# Patient Record
Sex: MALE | Race: WHITE | NOT HISPANIC OR LATINO | Employment: UNEMPLOYED | URBAN - METROPOLITAN AREA
[De-identification: names, ages, dates, MRNs, and addresses within clinical notes are randomized per-mention and may not be internally consistent; named-entity substitution may affect disease eponyms.]

---

## 2023-07-26 ENCOUNTER — HOSPITAL ENCOUNTER (OUTPATIENT)
Dept: DATA CONVERSION | Facility: HOSPITAL | Age: 83
Discharge: HOME HEALTH | End: 2023-07-29
Attending: INTERNAL MEDICINE

## 2023-07-26 DIAGNOSIS — M25.512 PAIN IN LEFT SHOULDER: ICD-10-CM

## 2023-07-26 DIAGNOSIS — Z86.16 PERSONAL HISTORY OF COVID-19: ICD-10-CM

## 2023-07-26 DIAGNOSIS — G47.00 INSOMNIA, UNSPECIFIED: ICD-10-CM

## 2023-07-26 DIAGNOSIS — Z63.4 DISAPPEARANCE AND DEATH OF FAMILY MEMBER: ICD-10-CM

## 2023-07-26 DIAGNOSIS — M19.90 UNSPECIFIED OSTEOARTHRITIS, UNSPECIFIED SITE: ICD-10-CM

## 2023-07-26 DIAGNOSIS — F32.A DEPRESSION, UNSPECIFIED: ICD-10-CM

## 2023-07-26 DIAGNOSIS — R42 DIZZINESS AND GIDDINESS: ICD-10-CM

## 2023-07-26 DIAGNOSIS — Z87.891 PERSONAL HISTORY OF NICOTINE DEPENDENCE: ICD-10-CM

## 2023-07-26 DIAGNOSIS — N40.0 BENIGN PROSTATIC HYPERPLASIA WITHOUT LOWER URINARY TRACT SYMPTOMS: ICD-10-CM

## 2023-07-26 DIAGNOSIS — E78.5 HYPERLIPIDEMIA, UNSPECIFIED: ICD-10-CM

## 2023-07-26 DIAGNOSIS — Z96.653 PRESENCE OF ARTIFICIAL KNEE JOINT, BILATERAL: ICD-10-CM

## 2023-07-26 DIAGNOSIS — M25.511 PAIN IN RIGHT SHOULDER: ICD-10-CM

## 2023-07-26 DIAGNOSIS — F10.10 ALCOHOL ABUSE, UNCOMPLICATED: ICD-10-CM

## 2023-07-26 DIAGNOSIS — Z79.899 OTHER LONG TERM (CURRENT) DRUG THERAPY: ICD-10-CM

## 2023-07-26 DIAGNOSIS — R77.8 OTHER SPECIFIED ABNORMALITIES OF PLASMA PROTEINS: ICD-10-CM

## 2023-07-26 DIAGNOSIS — R60.0 LOCALIZED EDEMA: ICD-10-CM

## 2023-07-26 DIAGNOSIS — M62.81 MUSCLE WEAKNESS (GENERALIZED): ICD-10-CM

## 2023-07-26 DIAGNOSIS — M54.50 LOW BACK PAIN, UNSPECIFIED: ICD-10-CM

## 2023-07-26 DIAGNOSIS — I10 ESSENTIAL (PRIMARY) HYPERTENSION: ICD-10-CM

## 2023-07-26 DIAGNOSIS — G62.9 POLYNEUROPATHY, UNSPECIFIED: ICD-10-CM

## 2023-07-26 DIAGNOSIS — R41.82 ALTERED MENTAL STATUS, UNSPECIFIED: ICD-10-CM

## 2023-07-26 DIAGNOSIS — I71.40 ABDOMINAL AORTIC ANEURYSM, WITHOUT RUPTURE, UNSPECIFIED (CMS-HCC): ICD-10-CM

## 2023-07-26 DIAGNOSIS — I44.0 ATRIOVENTRICULAR BLOCK, FIRST DEGREE: ICD-10-CM

## 2023-07-26 LAB
ALBUMIN SERPL-MCNC: 3.7 GM/DL (ref 3.5–5)
ALBUMIN/GLOB SERPL: 1.3 RATIO (ref 1.5–3)
ALP BLD-CCNC: 81 U/L (ref 35–125)
ALT SERPL-CCNC: 8 U/L (ref 5–40)
AMPHETAMINES UR QL SCN>1000 NG/ML: NEGATIVE
ANION GAP SERPL CALCULATED.3IONS-SCNC: 11 MMOL/L (ref 0–19)
AST SERPL-CCNC: 13 U/L (ref 5–40)
BACTERIA UR QL AUTO: NEGATIVE
BARBITURATES UR QL SCN>300 NG/ML: NEGATIVE
BASOPHILS # BLD AUTO: 0.03 K/UL (ref 0–0.22)
BASOPHILS NFR BLD AUTO: 0.5 % (ref 0–1)
BENZODIAZ UR QL SCN>300 NG/ML: NEGATIVE
BILIRUB DIRECT SERPL-MCNC: 0.2 MG/DL (ref 0–0.2)
BILIRUB INDIRECT SERPL-MCNC: 0.3 MG/DL (ref 0–0.8)
BILIRUB SERPL-MCNC: 0.5 MG/DL (ref 0.1–1.2)
BILIRUB UR QL STRIP.AUTO: NEGATIVE
BUN SERPL-MCNC: 11 MG/DL (ref 8–25)
BUN/CREAT SERPL: 8.5 RATIO (ref 8–21)
BZE UR QL SCN>300 NG/ML: NEGATIVE
CALCIUM SERPL-MCNC: 9 MG/DL (ref 8.5–10.4)
CANNABINOIDS UR QL SCN>50 NG/ML: NORMAL
CHLORIDE SERPL-SCNC: 103 MMOL/L (ref 97–107)
CLARITY UR: CLEAR
CO2 SERPL-SCNC: 24 MMOL/L (ref 24–31)
COLOR UR: NORMAL
CREAT SERPL-MCNC: 1.3 MG/DL (ref 0.4–1.6)
DEPRECATED RDW RBC AUTO: 47.7 FL (ref 37–54)
DIFFERENTIAL METHOD BLD: ABNORMAL
DRUG SCREEN COMMENT UR-IMP: NORMAL
EOSINOPHIL # BLD AUTO: 0.04 K/UL (ref 0–0.45)
EOSINOPHIL NFR BLD: 0.7 % (ref 0–3)
ERYTHROCYTE [DISTWIDTH] IN BLOOD BY AUTOMATED COUNT: 12.6 % (ref 11.7–15)
ETHANOL SERPL-MCNC: <0.01 GM/DL (ref 0–0.01)
FENTANYL+NORFENTANYL UR QL SCN: NEGATIVE
GFR SERPL CREATININE-BSD FRML MDRD: 55 ML/MIN/1.73 M2
GLOBULIN SER-MCNC: 2.9 G/DL (ref 1.9–3.7)
GLUCOSE SERPL-MCNC: 101 MG/DL (ref 65–99)
GLUCOSE UR STRIP.AUTO-MCNC: NEGATIVE MG/DL
HCT VFR BLD AUTO: 33.8 % (ref 41–50)
HGB BLD-MCNC: 11.9 GM/DL (ref 13.5–16.5)
HGB UR QL STRIP.AUTO: NORMAL /HPF (ref 0–3)
HGB UR QL: NEGATIVE
HS TROPONIN T DELTA: 1 (ref 0–4)
HS TROPONIN T DELTA: 5 (ref 0–4)
HS TROPONIN T DELTA: ABNORMAL (ref 0–4)
HYALINE CASTS UR QL AUTO: 3 /LPF
IMM GRANULOCYTES # BLD AUTO: 0.03 K/UL (ref 0–0.1)
KETONES UR QL STRIP.AUTO: NEGATIVE
LEUKOCYTE ESTERASE UR QL STRIP.AUTO: NEGATIVE
LIPASE SERPL-CCNC: 22 U/L (ref 16–63)
LYMPHOCYTES # BLD AUTO: 0.78 K/UL (ref 1.2–3.2)
LYMPHOCYTES NFR BLD MANUAL: 13.6 % (ref 20–40)
MCH RBC QN AUTO: 36.2 PG (ref 26–34)
MCHC RBC AUTO-ENTMCNC: 35.2 % (ref 31–37)
MCV RBC AUTO: 102.7 FL (ref 80–100)
METHADONE UR QL SCN>300 NG/ML: NEGATIVE
MICROSCOPIC (UA): NORMAL
MONOCYTES # BLD AUTO: 0.43 K/UL (ref 0–0.8)
MONOCYTES NFR BLD MANUAL: 7.5 % (ref 0–8)
NEUTROPHILS # BLD AUTO: 4.41 K/UL
NEUTROPHILS # BLD AUTO: 4.41 K/UL (ref 1.8–7.7)
NEUTROPHILS.IMMATURE NFR BLD: 0.5 % (ref 0–1)
NEUTS SEG NFR BLD: 77.2 % (ref 50–70)
NITRITE UR QL STRIP.AUTO: NEGATIVE
NRBC BLD-RTO: 0 /100 WBC
NT-PROBNP SERPL-MCNC: 202 PG/ML (ref 0–852)
OPIATES UR QL SCN>300 NG/ML: NEGATIVE
OXYCODONE UR QL: NEGATIVE
PCP UR QL SCN>25 NG/ML: NEGATIVE
PH UR STRIP.AUTO: 7.5 [PH] (ref 4.6–8)
PLATELET # BLD AUTO: 181 K/UL (ref 150–450)
PMV BLD AUTO: 10.6 CU (ref 7–12.6)
POTASSIUM SERPL-SCNC: 3.8 MMOL/L (ref 3.4–5.1)
PROT SERPL-MCNC: 6.6 G/DL (ref 5.9–7.9)
PROT UR STRIP.AUTO-MCNC: NEGATIVE MG/DL
RBC # BLD AUTO: 3.29 M/UL (ref 4.5–5.5)
SODIUM SERPL-SCNC: 138 MMOL/L (ref 133–145)
SP GR UR STRIP.AUTO: 1.01 (ref 1–1.03)
SQUAMOUS UR QL AUTO: NORMAL /HPF
TROPONIN T SERPL-MCNC: 21 NG/L
TROPONIN T SERPL-MCNC: 26 NG/L
TROPONIN T SERPL-MCNC: 27 NG/L
URINE CULTURE: NORMAL
UROBILINOGEN UR QL STRIP.AUTO: NORMAL MG/DL (ref 0–1)
WBC # BLD AUTO: 5.7 K/UL (ref 4.5–11)
WBC #/AREA URNS AUTO: 1 /HPF (ref 0–3)

## 2023-07-26 SDOH — SOCIAL STABILITY - SOCIAL INSECURITY: DISSAPEARANCE AND DEATH OF FAMILY MEMBER: Z63.4

## 2023-07-27 LAB
ALBUMIN SERPL-MCNC: 3.4 GM/DL (ref 3.5–5)
ALBUMIN/GLOB SERPL: 1.4 RATIO (ref 1.5–3)
ALP BLD-CCNC: 73 U/L (ref 35–125)
ALT SERPL-CCNC: 7 U/L (ref 5–40)
ANION GAP SERPL CALCULATED.3IONS-SCNC: 11 MMOL/L (ref 0–19)
ANTICOAGULANT: NORMAL
ANTICOAGULANT: NORMAL
APPEARANCE PLAS: CLEAR
APTT PPP: 28.2 SEC (ref 22–32.5)
AST SERPL-CCNC: 11 U/L (ref 5–40)
BASOPHILS # BLD AUTO: 0.02 K/UL (ref 0–0.22)
BASOPHILS NFR BLD AUTO: 0.4 % (ref 0–1)
BILIRUB SERPL-MCNC: 0.4 MG/DL (ref 0.1–1.2)
BUN SERPL-MCNC: 12 MG/DL (ref 8–25)
BUN/CREAT SERPL: 10.9 RATIO (ref 8–21)
CALCIUM SERPL-MCNC: 8.6 MG/DL (ref 8.5–10.4)
CHLORIDE SERPL-SCNC: 106 MMOL/L (ref 97–107)
CHOLEST SERPL-MCNC: 121 MG/DL (ref 133–200)
CHOLEST/HDLC SERPL: 2 RATIO
CO2 SERPL-SCNC: 23 MMOL/L (ref 24–31)
COLOR SPUN FLD: ABNORMAL
CREAT SERPL-MCNC: 1.1 MG/DL (ref 0.4–1.6)
DEPRECATED RDW RBC AUTO: 47.6 FL (ref 37–54)
DIFFERENTIAL METHOD BLD: ABNORMAL
EOSINOPHIL # BLD AUTO: 0.01 K/UL (ref 0–0.45)
EOSINOPHIL NFR BLD: 0.2 % (ref 0–3)
ERYTHROCYTE [DISTWIDTH] IN BLOOD BY AUTOMATED COUNT: 12.4 % (ref 11.7–15)
FASTING STATUS PATIENT QL REPORTED: ABNORMAL
GFR SERPL CREATININE-BSD FRML MDRD: 67 ML/MIN/1.73 M2
GLOBULIN SER-MCNC: 2.5 G/DL (ref 1.9–3.7)
GLUCOSE SERPL-MCNC: 94 MG/DL (ref 65–99)
HCT VFR BLD AUTO: 30.4 % (ref 41–50)
HDLC SERPL-MCNC: 62 MG/DL
HGB BLD-MCNC: 10.4 GM/DL (ref 13.5–16.5)
IMM GRANULOCYTES # BLD AUTO: 0.02 K/UL (ref 0–0.1)
INR PPP: 1.1 (ref 0.86–1.16)
LDLC SERPL CALC-MCNC: 50 MG/DL (ref 65–130)
LYMPHOCYTES # BLD AUTO: 1 K/UL (ref 1.2–3.2)
LYMPHOCYTES NFR BLD MANUAL: 21.1 % (ref 20–40)
MAGNESIUM SERPL-MCNC: 2.2 MG/DL (ref 1.6–3.1)
MCH RBC QN AUTO: 35.5 PG (ref 26–34)
MCHC RBC AUTO-ENTMCNC: 34.2 % (ref 31–37)
MCV RBC AUTO: 103.8 FL (ref 80–100)
MONOCYTES # BLD AUTO: 0.41 K/UL (ref 0–0.8)
MONOCYTES NFR BLD MANUAL: 8.6 % (ref 0–8)
NEUTROPHILS # BLD AUTO: 3.28 K/UL
NEUTROPHILS # BLD AUTO: 3.28 K/UL (ref 1.8–7.7)
NEUTROPHILS.IMMATURE NFR BLD: 0.4 % (ref 0–1)
NEUTS SEG NFR BLD: 69.3 % (ref 50–70)
NRBC BLD-RTO: 0 /100 WBC
PLATELET # BLD AUTO: 175 K/UL (ref 150–450)
PMV BLD AUTO: 11.3 CU (ref 7–12.6)
POTASSIUM SERPL-SCNC: 3.7 MMOL/L (ref 3.4–5.1)
PROT SERPL-MCNC: 5.9 G/DL (ref 5.9–7.9)
PROTHROMBIN TIME: 11.2 SEC (ref 9.3–12.7)
RBC # BLD AUTO: 2.93 M/UL (ref 4.5–5.5)
SODIUM SERPL-SCNC: 140 MMOL/L (ref 133–145)
TRIGL SERPL-MCNC: 45 MG/DL (ref 40–150)
WBC # BLD AUTO: 4.7 K/UL (ref 4.5–11)

## 2023-07-28 LAB — GLUCOSE BLD STRIP.AUTO-MCNC: 101 MG/DL (ref 65–99)

## 2023-10-13 DIAGNOSIS — G62.9 NEUROPATHY: ICD-10-CM

## 2023-10-13 RX ORDER — GABAPENTIN 300 MG/1
1 CAPSULE ORAL 3 TIMES DAILY
COMMUNITY
Start: 2022-07-05 | End: 2023-10-13 | Stop reason: SDUPTHER

## 2023-10-15 RX ORDER — GABAPENTIN 300 MG/1
300 CAPSULE ORAL 3 TIMES DAILY
Qty: 90 CAPSULE | Refills: 5 | Status: SHIPPED | OUTPATIENT
Start: 2023-10-15 | End: 2024-03-25 | Stop reason: SDUPTHER

## 2023-10-17 ENCOUNTER — TELEPHONE (OUTPATIENT)
Dept: PRIMARY CARE | Facility: CLINIC | Age: 83
End: 2023-10-17
Payer: COMMERCIAL

## 2023-10-17 NOTE — TELEPHONE ENCOUNTER
Call placed to patient number as listed, [phone], no answer, left message requesting return call to office to discuss appointment 11/8/23 , provider ALEX Valadez NP not available, will keep appointment same date and time. Provider will be Dara Souza NP.  Awaiting return call

## 2023-10-23 NOTE — TELEPHONE ENCOUNTER
It is noted patient returned call to office, was scheduled an appointment 10/25/23 with Dara Souza NP.

## 2023-11-15 PROBLEM — F32.9 MAJOR DEPRESSIVE DISORDER, SINGLE EPISODE, UNSPECIFIED: Status: ACTIVE | Noted: 2023-11-15

## 2023-11-15 PROBLEM — F41.8 MIXED ANXIETY AND DEPRESSIVE DISORDER: Status: ACTIVE | Noted: 2023-11-15

## 2023-11-15 PROBLEM — M15.9 OSTEOARTHRITIS OF MULTIPLE JOINTS: Status: ACTIVE | Noted: 2023-11-15

## 2023-11-15 PROBLEM — F41.9 ANXIETY: Status: ACTIVE | Noted: 2023-11-15

## 2023-11-15 PROBLEM — G47.00 INSOMNIA: Status: ACTIVE | Noted: 2023-11-15

## 2023-11-15 PROBLEM — I71.40 AAA (ABDOMINAL AORTIC ANEURYSM) WITHOUT RUPTURE (CMS-HCC): Status: ACTIVE | Noted: 2023-11-15

## 2023-11-15 PROBLEM — F17.200 NICOTINE DEPENDENCE: Status: ACTIVE | Noted: 2023-11-15

## 2023-11-15 PROBLEM — F03.90 DEMENTIA (MULTI): Status: ACTIVE | Noted: 2023-11-15

## 2023-11-15 PROBLEM — J31.0 CHRONIC RHINITIS: Status: ACTIVE | Noted: 2023-11-15

## 2023-11-15 PROBLEM — I10 ESSENTIAL HYPERTENSION: Status: ACTIVE | Noted: 2019-08-05

## 2023-11-15 PROBLEM — R41.3 MEMORY LOSS DUE TO MEDICAL CONDITION: Status: ACTIVE | Noted: 2023-11-15

## 2023-11-15 PROBLEM — F43.21 GRIEF: Status: ACTIVE | Noted: 2023-11-15

## 2023-11-15 PROBLEM — K59.00 CONSTIPATION: Status: ACTIVE | Noted: 2023-11-15

## 2023-11-15 PROBLEM — F33.2 MAJOR DEPRESSIVE DISORDER, RECURRENT SEVERE WITHOUT PSYCHOTIC FEATURES (MULTI): Status: ACTIVE | Noted: 2023-11-15

## 2023-11-15 PROBLEM — Z96.612 STATUS POST REVERSE TOTAL REPLACEMENT OF LEFT SHOULDER: Status: ACTIVE | Noted: 2023-11-15

## 2023-11-15 PROBLEM — G40.909 SEIZURE DISORDER (MULTI): Status: ACTIVE | Noted: 2023-11-15

## 2023-11-15 PROBLEM — J32.9 SINUSITIS: Status: ACTIVE | Noted: 2019-11-18

## 2023-11-15 PROBLEM — E55.9 VITAMIN D DEFICIENCY: Status: ACTIVE | Noted: 2020-03-04

## 2023-11-15 PROBLEM — E78.5 HYPERLIPIDEMIA: Status: ACTIVE | Noted: 2019-08-05

## 2023-11-15 PROBLEM — M19.079 PRIMARY LOCALIZED OSTEOARTHROSIS OF ANKLE AND FOOT: Status: ACTIVE | Noted: 2023-11-15

## 2023-11-15 PROBLEM — N40.0 BENIGN PROSTATIC HYPERPLASIA: Status: ACTIVE | Noted: 2023-11-15

## 2023-11-15 PROBLEM — M19.049 LOCALIZED, PRIMARY OSTEOARTHRITIS OF HAND: Status: ACTIVE | Noted: 2023-11-15

## 2023-11-15 PROBLEM — G93.40 ENCEPHALOPATHY: Status: ACTIVE | Noted: 2023-11-15

## 2023-11-15 PROBLEM — H91.90 HEARING LOSS: Status: ACTIVE | Noted: 2023-11-15

## 2023-11-15 RX ORDER — POLYVINYL ALCOHOL 14 MG/ML
2 SOLUTION/ DROPS OPHTHALMIC 3 TIMES DAILY PRN
COMMUNITY

## 2023-11-15 RX ORDER — LISINOPRIL 20 MG/1
20 TABLET ORAL DAILY
COMMUNITY
Start: 2022-06-01 | End: 2024-01-18 | Stop reason: SDUPTHER

## 2023-11-15 RX ORDER — AMLODIPINE BESYLATE 5 MG/1
5 TABLET ORAL DAILY
COMMUNITY
Start: 2023-07-24 | End: 2023-12-08 | Stop reason: SDUPTHER

## 2023-11-15 RX ORDER — FUROSEMIDE 20 MG/1
20 TABLET ORAL DAILY
COMMUNITY
End: 2023-11-26 | Stop reason: ALTCHOICE

## 2023-11-15 RX ORDER — AZELASTINE 1 MG/ML
1 SPRAY, METERED NASAL 2 TIMES DAILY
COMMUNITY
Start: 2019-08-05 | End: 2024-01-18 | Stop reason: SDUPTHER

## 2023-11-15 RX ORDER — BUDESONIDE 3 MG/1
3 CAPSULE, COATED PELLETS ORAL DAILY
COMMUNITY
Start: 2023-07-21 | End: 2023-12-08 | Stop reason: SDUPTHER

## 2023-11-15 RX ORDER — VENLAFAXINE 50 MG/1
50 TABLET ORAL
COMMUNITY
Start: 2022-06-01 | End: 2024-03-25 | Stop reason: SDUPTHER

## 2023-11-15 RX ORDER — MEMANTINE HYDROCHLORIDE 28 MG/1
28 CAPSULE, EXTENDED RELEASE ORAL DAILY
COMMUNITY
End: 2024-03-25 | Stop reason: SDUPTHER

## 2023-11-15 RX ORDER — TAMSULOSIN HYDROCHLORIDE 0.4 MG/1
0.4 CAPSULE ORAL DAILY
COMMUNITY
Start: 2013-08-16 | End: 2023-11-26 | Stop reason: SDUPTHER

## 2023-11-15 RX ORDER — TRAZODONE HYDROCHLORIDE 100 MG/1
100 TABLET ORAL NIGHTLY
COMMUNITY
Start: 2013-03-20 | End: 2023-12-08 | Stop reason: SDUPTHER

## 2023-11-15 RX ORDER — LORATADINE 10 MG/1
10 TABLET ORAL DAILY
COMMUNITY
Start: 2023-08-31 | End: 2024-02-19 | Stop reason: SDUPTHER

## 2023-11-15 RX ORDER — IBUPROFEN 400 MG/1
TABLET ORAL
COMMUNITY
End: 2024-02-09 | Stop reason: HOSPADM

## 2023-11-15 RX ORDER — CHOLECALCIFEROL (VITAMIN D3) 125 MCG
125 CAPSULE ORAL DAILY
COMMUNITY
End: 2024-03-25 | Stop reason: SDUPTHER

## 2023-11-15 RX ORDER — TALC
3 POWDER (GRAM) TOPICAL NIGHTLY
COMMUNITY
End: 2024-03-25 | Stop reason: SDUPTHER

## 2023-11-15 RX ORDER — SIMVASTATIN 20 MG/1
20 TABLET, FILM COATED ORAL DAILY
COMMUNITY
Start: 2013-10-25 | End: 2024-03-25 | Stop reason: SDUPTHER

## 2023-11-16 ENCOUNTER — TELEPHONE (OUTPATIENT)
Dept: PRIMARY CARE | Facility: CLINIC | Age: 83
End: 2023-11-16
Payer: COMMERCIAL

## 2023-11-17 ENCOUNTER — OFFICE VISIT (OUTPATIENT)
Dept: PRIMARY CARE | Facility: CLINIC | Age: 83
End: 2023-11-17
Payer: COMMERCIAL

## 2023-11-17 ENCOUNTER — LAB (OUTPATIENT)
Dept: LAB | Facility: LAB | Age: 83
End: 2023-11-17
Payer: COMMERCIAL

## 2023-11-17 VITALS
OXYGEN SATURATION: 98 % | SYSTOLIC BLOOD PRESSURE: 132 MMHG | TEMPERATURE: 97.3 F | HEART RATE: 92 BPM | DIASTOLIC BLOOD PRESSURE: 66 MMHG

## 2023-11-17 DIAGNOSIS — I10 ESSENTIAL HYPERTENSION: ICD-10-CM

## 2023-11-17 DIAGNOSIS — F41.8 MIXED ANXIETY AND DEPRESSIVE DISORDER: ICD-10-CM

## 2023-11-17 DIAGNOSIS — N40.1 BENIGN PROSTATIC HYPERPLASIA WITH URINARY FREQUENCY: ICD-10-CM

## 2023-11-17 DIAGNOSIS — J01.41 ACUTE RECURRENT PANSINUSITIS: ICD-10-CM

## 2023-11-17 DIAGNOSIS — R60.0 BILATERAL LEG EDEMA: ICD-10-CM

## 2023-11-17 DIAGNOSIS — R41.3 MEMORY LOSS DUE TO MEDICAL CONDITION: ICD-10-CM

## 2023-11-17 DIAGNOSIS — E55.9 VITAMIN D DEFICIENCY: ICD-10-CM

## 2023-11-17 DIAGNOSIS — R35.0 BENIGN PROSTATIC HYPERPLASIA WITH URINARY FREQUENCY: ICD-10-CM

## 2023-11-17 DIAGNOSIS — I71.40 ABDOMINAL AORTIC ANEURYSM (AAA) WITHOUT RUPTURE, UNSPECIFIED PART (CMS-HCC): ICD-10-CM

## 2023-11-17 DIAGNOSIS — G62.9 NEUROPATHY: ICD-10-CM

## 2023-11-17 DIAGNOSIS — M79.89 LEG SWELLING: Primary | ICD-10-CM

## 2023-11-17 LAB
25(OH)D3 SERPL-MCNC: 68 NG/ML (ref 31–100)
ALBUMIN SERPL-MCNC: 4.3 G/DL (ref 3.5–5)
ALP BLD-CCNC: 94 U/L (ref 35–125)
ALT SERPL-CCNC: 5 U/L (ref 5–40)
ANION GAP SERPL CALC-SCNC: 14 MMOL/L
AST SERPL-CCNC: 14 U/L (ref 5–40)
BILIRUB SERPL-MCNC: 0.5 MG/DL (ref 0.1–1.2)
BUN SERPL-MCNC: 15 MG/DL (ref 8–25)
CALCIUM SERPL-MCNC: 9.8 MG/DL (ref 8.5–10.4)
CHLORIDE SERPL-SCNC: 103 MMOL/L (ref 97–107)
CO2 SERPL-SCNC: 25 MMOL/L (ref 24–31)
CREAT SERPL-MCNC: 1.2 MG/DL (ref 0.4–1.6)
ERYTHROCYTE [DISTWIDTH] IN BLOOD BY AUTOMATED COUNT: 13.3 % (ref 11.5–14.5)
GFR SERPL CREATININE-BSD FRML MDRD: 61 ML/MIN/1.73M*2
GLUCOSE SERPL-MCNC: 120 MG/DL (ref 65–99)
HCT VFR BLD AUTO: 38.6 % (ref 41–52)
HGB BLD-MCNC: 13.1 G/DL (ref 13.5–17.5)
MCH RBC QN AUTO: 34.7 PG (ref 26–34)
MCHC RBC AUTO-ENTMCNC: 33.9 G/DL (ref 32–36)
MCV RBC AUTO: 102 FL (ref 80–100)
NRBC BLD-RTO: 0 /100 WBCS (ref 0–0)
PLATELET # BLD AUTO: 247 X10*3/UL (ref 150–450)
POTASSIUM SERPL-SCNC: 4.3 MMOL/L (ref 3.4–5.1)
PROT SERPL-MCNC: 7 G/DL (ref 5.9–7.9)
RBC # BLD AUTO: 3.77 X10*6/UL (ref 4.5–5.9)
SODIUM SERPL-SCNC: 142 MMOL/L (ref 133–145)
TSH SERPL DL<=0.05 MIU/L-ACNC: 0.93 MIU/L (ref 0.27–4.2)
WBC # BLD AUTO: 6.2 X10*3/UL (ref 4.4–11.3)

## 2023-11-17 PROCEDURE — 84443 ASSAY THYROID STIM HORMONE: CPT

## 2023-11-17 PROCEDURE — 1160F RVW MEDS BY RX/DR IN RCRD: CPT | Performed by: NURSE PRACTITIONER

## 2023-11-17 PROCEDURE — 36415 COLL VENOUS BLD VENIPUNCTURE: CPT | Performed by: NURSE PRACTITIONER

## 2023-11-17 PROCEDURE — 3078F DIAST BP <80 MM HG: CPT | Performed by: NURSE PRACTITIONER

## 2023-11-17 PROCEDURE — 1126F AMNT PAIN NOTED NONE PRSNT: CPT | Performed by: NURSE PRACTITIONER

## 2023-11-17 PROCEDURE — 99349 HOME/RES VST EST MOD MDM 40: CPT | Performed by: NURSE PRACTITIONER

## 2023-11-17 PROCEDURE — 1159F MED LIST DOCD IN RCRD: CPT | Performed by: NURSE PRACTITIONER

## 2023-11-17 PROCEDURE — 80053 COMPREHEN METABOLIC PANEL: CPT

## 2023-11-17 PROCEDURE — 3075F SYST BP GE 130 - 139MM HG: CPT | Performed by: NURSE PRACTITIONER

## 2023-11-17 PROCEDURE — 36415 COLL VENOUS BLD VENIPUNCTURE: CPT

## 2023-11-17 PROCEDURE — 82306 VITAMIN D 25 HYDROXY: CPT

## 2023-11-17 PROCEDURE — 85027 COMPLETE CBC AUTOMATED: CPT

## 2023-11-17 RX ORDER — FUROSEMIDE 20 MG/1
20 TABLET ORAL DAILY
Qty: 30 TABLET | Refills: 11 | Status: SHIPPED | OUTPATIENT
Start: 2023-11-17 | End: 2023-11-26 | Stop reason: SDUPTHER

## 2023-11-17 RX ORDER — AMOXICILLIN AND CLAVULANATE POTASSIUM 875; 125 MG/1; MG/1
1 TABLET, FILM COATED ORAL 2 TIMES DAILY
Qty: 20 TABLET | Refills: 0 | Status: SHIPPED | OUTPATIENT
Start: 2023-11-17 | End: 2023-11-27

## 2023-11-17 RX ORDER — POTASSIUM CHLORIDE 750 MG/1
10 TABLET, FILM COATED, EXTENDED RELEASE ORAL DAILY
Qty: 30 TABLET | Refills: 0 | Status: SHIPPED | OUTPATIENT
Start: 2023-11-17 | End: 2023-11-26 | Stop reason: SDUPTHER

## 2023-11-17 ASSESSMENT — PAIN SCALES - GENERAL: PAINLEVEL: 0-NO PAIN

## 2023-11-17 NOTE — PROGRESS NOTES
Subjective   Patient ID: Demarco Gudino is a 82 y.o. male who presents for Routine follow-up.    HPI   [facility]. Patient continues to reside in a single family home by himself as his wife remains in LTC. He still does not drive but his neighbors continue to assist more with groceries and other needed errands. His nephew is his only other relative and is no longer very involved with his care. Medications are delivered through the mail ([facility]) and were once again reviewed with patient during today's visit. Patient remains independent with ADLs; he states that his nephew manages his finances and pays all of his bills. Patient does the majroity of his own cooking in addition to recieving Meals on Wheels. He is also working with a  through [employer]. Patient denies issues with sleep, appetite, staying hydration, bowel or bladder. He ambulates independently and denies any recent falls.    Sinus complaints - Patient feel like he has a persistent sinus infection. Antibiotics were ordered at last visit but were never received by patient. He is complaining of sinus pressure, congestion and associated difficulty breathing. Patient denies any fever, chills or other systemic concerns.    Depression/ Dementia/ Suicide attempt Hx - Stable. Patient denies any current SI and continues to follow routinely with [facility] mental health services    Neuropathy - Patient states that the neuropathy in bilateral hands and feet persists but is tolerable at this time.    AAA - Surgical repair was originally recommended in 09/2022. Patient has not yet followed with a vascular surgeon and has no intentions to at this time.    Current Outpatient Medications:     Allergy Relief, loratadine, 10 mg tablet, Take 1 tablet (10 mg) by mouth once daily., Disp: , Rfl:     amLODIPine (Norvasc) 5 mg tablet, Take 1 tablet (5 mg) by mouth once daily., Disp: , Rfl:     amoxicillin-pot  clavulanate (Augmentin) 875-125 mg tablet, Take 1 tablet (875 mg) by mouth 2 times a day for 10 days., Disp: 20 tablet, Rfl: 0    azelastine (Astelin) 137 mcg (0.1 %) nasal spray, Administer 1 spray into each nostril 2 times a day., Disp: , Rfl:     budesonide EC (Entocort EC) 3 mg 24 hr capsule, Take 1 capsule (3 mg) by mouth once daily., Disp: , Rfl:     cholecalciferol (Vitamin D-3) 125 MCG (5000 UT) capsule, Take 1 capsule (125 mcg) by mouth once daily., Disp: , Rfl:     furosemide (Lasix) 20 mg tablet, Take 1 tablet (20 mg) by mouth once daily., Disp: 30 tablet, Rfl: 11    gabapentin (Neurontin) 300 mg capsule, Take 1 capsule (300 mg) by mouth 3 times a day., Disp: 90 capsule, Rfl: 5    ibuprofen 400 mg tablet, 1 tablet with food or milk as needed Orally every 6 hrs  prn, Disp: , Rfl:     lisinopril 20 mg tablet, Take 1 tablet (20 mg) by mouth once daily., Disp: , Rfl:     melatonin 3 mg tablet, Take 1 tablet (3 mg) by mouth once daily at bedtime., Disp: , Rfl:     memantine (Namenda) 28 mg capsule,sprinkle,ER 24hr, Take 1 capsule (28 mg) by mouth once daily., Disp: , Rfl:     polyvinyl alcohol (Liquifilm Tears) 1.4 % ophthalmic solution, Administer 2 drops into both eyes 3 times a day as needed., Disp: , Rfl:     potassium chloride CR (Klor-Con) 10 mEq ER tablet, Take 1 tablet (10 mEq) by mouth once daily. Do not crush, chew, or split., Disp: 30 tablet, Rfl: 11    simvastatin (Zocor) 20 mg tablet, Take 1 tablet (20 mg) by mouth once daily., Disp: , Rfl:     tamsulosin (Flomax) 0.4 mg 24 hr capsule, Take 2 capsules (0.8 mg) by mouth once daily., Disp: 60 capsule, Rfl: 11    traZODone (Desyrel) 100 mg tablet, Take 1 tablet (100 mg) by mouth once daily at bedtime., Disp: , Rfl:     venlafaxine (Effexor) 50 mg tablet, Take 1 tablet (50 mg) by mouth once daily in the morning. Take before meals., Disp: , Rfl:      Review of Systems   Constitutional:  Negative for chills, fatigue and fever.   HENT:  Positive for  rhinorrhea, sinus pressure and sinus pain. Negative for dental problem and trouble swallowing.         See HPI   Eyes:  Negative for pain and visual disturbance.   Respiratory:  Negative for cough, shortness of breath and wheezing.    Cardiovascular:  Positive for leg swelling. Negative for chest pain and palpitations.   Gastrointestinal:  Negative for abdominal distention, abdominal pain, constipation, diarrhea and nausea.   Endocrine: Negative for cold intolerance and heat intolerance.   Genitourinary:  Positive for frequency.   Musculoskeletal:  Negative for gait problem, joint swelling and myalgias.   Skin:  Negative for color change, pallor, rash and wound.   Allergic/Immunologic: Negative for environmental allergies and food allergies.   Neurological:  Negative for dizziness, seizures, weakness and headaches.        Positive for neruopathy   Hematological:  Negative for adenopathy. Does not bruise/bleed easily.   Psychiatric/Behavioral:  Negative for behavioral problems.         Positive for depression and dementia history   All other systems reviewed and are negative.      Objective   /66   Pulse 92   Temp 36.3 °C (97.3 °F)   SpO2 98%     Physical Exam  Constitutional:       General: He is not in acute distress.     Appearance: Normal appearance. He is not toxic-appearing.   HENT:      Head: Normocephalic and atraumatic.      Nose: Nose normal.      Mouth/Throat:      Mouth: Mucous membranes are moist.      Pharynx: Oropharynx is clear.   Eyes:      Extraocular Movements: Extraocular movements intact.      Conjunctiva/sclera: Conjunctivae normal.      Pupils: Pupils are equal, round, and reactive to light.   Cardiovascular:      Rate and Rhythm: Normal rate and regular rhythm.      Pulses: Normal pulses.      Heart sounds: Normal heart sounds. No murmur heard.  Pulmonary:      Effort: Pulmonary effort is normal.      Breath sounds: Normal breath sounds. No wheezing.   Abdominal:      General: Abdomen  is flat. Bowel sounds are normal.      Palpations: Abdomen is soft.   Musculoskeletal:         General: No swelling.      Cervical back: Neck supple.      Right lower leg: Edema present.      Left lower leg: Edema present.      Comments: +1/2 BLE Edema   Skin:     General: Skin is warm and dry.   Neurological:      General: No focal deficit present.      Mental Status: He is alert. Mental status is at baseline.      Cranial Nerves: No cranial nerve deficit.      Motor: No weakness.      Comments: Positive for neuropathy bilateral hands and feet   Psychiatric:         Mood and Affect: Mood normal.         Behavior: Behavior normal.         Thought Content: Thought content normal.         Judgment: Judgment normal.         Assessment/Plan   Problem List Items Addressed This Visit             ICD-10-CM       Cardiac and Vasculature     Patient to continue current medication regiment. Vascular surgery follow up has been recommended but patient continues to decline as he is not interested in pursuing surgery         AAA (abdominal aortic aneurysm) without rupture (CMS/[facility]) I71.40    Essential hypertension - Primary I10    Relevant Orders    CBC (Completed)    Comprehensive Metabolic Panel (Completed)       ENT     Will initiate course of PO ABX. Patient to notify provider for any persistent or worsening S/S. ENT follow-up previously recommended but declined at this time         Sinusitis J32.9    Relevant Medications    amoxicillin-pot clavulanate (Augmentin) 875-125 mg tablet       Endocrine/Metabolic    Vitamin D deficiency E55.9    Relevant Orders    Vitamin D 25-Hydroxy,Total (for eval of Vitamin D levels) (Completed)       Genitourinary and Reproductive     Will increase BPH medication as patient cites some improvement but no resolution in his symptoms         Benign prostatic hyperplasia N40.0    Relevant Medications    tamsulosin (Flomax) 0.4 mg 24 hr capsule       Mental Health     Patient continues to follow  with Nithya Fitch CNP with [facility] mental health services         Mixed anxiety and depressive disorder F41.8       Neuro     Patient to continue with current gabapentin dosing. 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 Potassium will also be added. Patient to notify provider for any persistent or worsening issues with edema on increased diuretic dosing         Bilateral leg edema R60.0     Other Visit Diagnoses         Codes    Leg swelling     M79.89    Relevant Medications    furosemide (Lasix) 20 mg tablet    potassium chloride CR (Klor-Con) 10 mEq ER tablet          Routine blood work drawn today without issue       Alyssa Valadez, APRN-CNP

## 2023-11-26 PROBLEM — R60.0 BILATERAL LEG EDEMA: Status: ACTIVE | Noted: 2023-11-26

## 2023-11-26 RX ORDER — POTASSIUM CHLORIDE 750 MG/1
10 TABLET, FILM COATED, EXTENDED RELEASE ORAL DAILY
Qty: 30 TABLET | Refills: 11 | Status: SHIPPED | OUTPATIENT
Start: 2023-11-26 | End: 2023-12-26

## 2023-11-26 RX ORDER — TAMSULOSIN HYDROCHLORIDE 0.4 MG/1
0.8 CAPSULE ORAL DAILY
Qty: 60 CAPSULE | Refills: 11 | Status: SHIPPED | OUTPATIENT
Start: 2023-11-26 | End: 2023-12-26

## 2023-11-26 RX ORDER — FUROSEMIDE 20 MG/1
20 TABLET ORAL DAILY
Qty: 30 TABLET | Refills: 11 | Status: SHIPPED | OUTPATIENT
Start: 2023-11-26 | End: 2023-12-12 | Stop reason: ALTCHOICE

## 2023-11-26 ASSESSMENT — ENCOUNTER SYMPTOMS
ABDOMINAL DISTENTION: 0
SEIZURES: 0
HEADACHES: 0
COUGH: 0
SINUS PAIN: 1
TROUBLE SWALLOWING: 0
CONSTIPATION: 0
ABDOMINAL PAIN: 0
RHINORRHEA: 1
ADENOPATHY: 0
PALPITATIONS: 0
WHEEZING: 0
NAUSEA: 0
FREQUENCY: 1
DIZZINESS: 0
BRUISES/BLEEDS EASILY: 0
WOUND: 0
FEVER: 0
EYE PAIN: 0
COLOR CHANGE: 0
FATIGUE: 0
JOINT SWELLING: 0
SINUS PRESSURE: 1
WEAKNESS: 0
DIARRHEA: 0
CHILLS: 0
MYALGIAS: 0
SHORTNESS OF BREATH: 0

## 2023-11-27 NOTE — ASSESSMENT & PLAN NOTE
Will initiate course of PO ABX. Patient to notify provider for any persistent or worsening S/S. ENT follow-up previously recommended but declined at this time

## 2023-11-27 NOTE — ASSESSMENT & PLAN NOTE
Will increase scheduled furosemide due to persistent issues with BLE edema. Potassium will also be added. Patient to notify provider for any persistent or worsening issues with edema on increased diuretic dosing

## 2023-11-27 NOTE — ASSESSMENT & PLAN NOTE
Patient to continue with current gabapentin dosing. He is to continue with comfort measures and supportive care

## 2023-11-27 NOTE — ASSESSMENT & PLAN NOTE
Patient to continue current medication regiment. Vascular surgery follow up has been recommended but patient continues to decline as he is not interested in pursuing surgery

## 2023-11-27 NOTE — ASSESSMENT & PLAN NOTE
Will increase BPH medication as patient cites some improvement but no resolution in his symptoms   Name And Contact Information For Health Care Proxy: TRAY Maxwell, [phone] Name And Contact Information For Health Care Proxy: TRAY Maxwell, [phone]

## 2023-12-08 ENCOUNTER — OFFICE VISIT (OUTPATIENT)
Dept: PRIMARY CARE | Facility: CLINIC | Age: 83
End: 2023-12-08
Payer: COMMERCIAL

## 2023-12-08 DIAGNOSIS — F33.2 MAJOR DEPRESSIVE DISORDER, RECURRENT SEVERE WITHOUT PSYCHOTIC FEATURES (MULTI): ICD-10-CM

## 2023-12-08 DIAGNOSIS — K52.9 COLITIS: Primary | ICD-10-CM

## 2023-12-08 DIAGNOSIS — I71.40 ABDOMINAL AORTIC ANEURYSM (AAA) WITHOUT RUPTURE, UNSPECIFIED PART (CMS-HCC): ICD-10-CM

## 2023-12-08 DIAGNOSIS — G62.9 NEUROPATHY: ICD-10-CM

## 2023-12-08 DIAGNOSIS — I10 ESSENTIAL HYPERTENSION, BENIGN: ICD-10-CM

## 2023-12-08 DIAGNOSIS — F41.9 ANXIETY: ICD-10-CM

## 2023-12-08 DIAGNOSIS — R60.0 BILATERAL LEG EDEMA: Primary | ICD-10-CM

## 2023-12-08 PROCEDURE — 3075F SYST BP GE 130 - 139MM HG: CPT | Performed by: NURSE PRACTITIONER

## 2023-12-08 PROCEDURE — 99349 HOME/RES VST EST MOD MDM 40: CPT | Performed by: NURSE PRACTITIONER

## 2023-12-08 PROCEDURE — 1159F MED LIST DOCD IN RCRD: CPT | Performed by: NURSE PRACTITIONER

## 2023-12-08 PROCEDURE — 1160F RVW MEDS BY RX/DR IN RCRD: CPT | Performed by: NURSE PRACTITIONER

## 2023-12-08 PROCEDURE — 1126F AMNT PAIN NOTED NONE PRSNT: CPT | Performed by: NURSE PRACTITIONER

## 2023-12-08 PROCEDURE — 3079F DIAST BP 80-89 MM HG: CPT | Performed by: NURSE PRACTITIONER

## 2023-12-08 RX ORDER — FUROSEMIDE 20 MG/1
40 TABLET ORAL DAILY
Qty: 28 TABLET | Refills: 0 | Status: SHIPPED | OUTPATIENT
Start: 2023-12-08 | End: 2023-12-12 | Stop reason: ALTCHOICE

## 2023-12-08 RX ORDER — AZELASTINE HYDROCHLORIDE, FLUTICASONE PROPIONATE 137; 50 UG/1; UG/1
1 SPRAY, METERED NASAL 2 TIMES DAILY
COMMUNITY
End: 2024-05-01 | Stop reason: WASHOUT

## 2023-12-08 ASSESSMENT — ENCOUNTER SYMPTOMS
PALPITATIONS: 0
EYE PAIN: 0
HEADACHES: 0
TROUBLE SWALLOWING: 0
WEAKNESS: 0
CHILLS: 0
JOINT SWELLING: 0
MYALGIAS: 0
WOUND: 0
ABDOMINAL PAIN: 0
ABDOMINAL DISTENTION: 0
BRUISES/BLEEDS EASILY: 0
FEVER: 0
COUGH: 0
SEIZURES: 0
NAUSEA: 0
SHORTNESS OF BREATH: 0
RHINORRHEA: 1
DIZZINESS: 0
FATIGUE: 0
COLOR CHANGE: 0
CONSTIPATION: 0
ADENOPATHY: 0
DIARRHEA: 0
WHEEZING: 0

## 2023-12-08 ASSESSMENT — PAIN SCALES - GENERAL: PAINLEVEL: 0-NO PAIN

## 2023-12-08 NOTE — PROGRESS NOTES
Subjective   Patient ID: Demarco Gudino is a 83 y.o. male who presents for Increased leg edema.    HPI   Patient seen up in recliner in no acute distress. His psychiatric nurse practitioner notified the [facility] office earlier this week that patient was having issues with increased RLE edema. Patient states that he has been taking his diuretics as prescribed and denies any associated SOB or CP. He admits to not always following a low sodium diet or keeping his legs elevated throughout the day. Patient does not wear compression stockings and does not weigh himself routinely.    Patient continues to reside in a single family home by himself as his wife remains in LTC. He still does not drive but his neighbors continue to assist more with groceries and other needed errands. His nephew is his only other relative and is no longer very involved with his care. Medications are delivered through the mail ([facility]). Patient remains independent with ADLs; he states that his nephew manages his finances and pays all of his bills. Patient does the majroity of his own cooking in addition to recieving Meals on Wheels. He is also working with a  through [employer]. Patient denies issues with sleep, appetite, staying hydration, bowel or bladder. He ambulates independently and denies any recent falls.    Depression/ Dementia/ Suicide attempt Hx - Stable. Patient denies any current SI and continues to follow routinely with [facility] mental health services    Neuropathy - Patient states that the neuropathy in bilateral hands and feet persists but is tolerable at this time.    AAA - Surgical repair was originally recommended in 09/2022. Patient has not yet followed with a vascular surgeon and has no intentions to at this time.      Current Outpatient Medications:     Allergy Relief, loratadine, 10 mg tablet, Take 1 tablet (10 mg) by mouth once daily., Disp: , Rfl:     amLODIPine (Norvasc) 5 mg tablet,  Take 1 tablet (5 mg) by mouth once daily., Disp: 30 tablet, Rfl: 11    azelastine (Astelin) 137 mcg (0.1 %) nasal spray, Administer 1 spray into each nostril 2 times a day., Disp: , Rfl:     azelastine-fluticasone (Dymista) 137-50 mcg/spray nasal spray, Administer 1 spray into each nostril 2 times a day., Disp: , Rfl:     budesonide EC (Entocort EC) 3 mg 24 hr capsule, Take 1 capsule (3 mg) by mouth once daily., Disp: 30 capsule, Rfl: 11    cholecalciferol (Vitamin D-3) 125 MCG (5000 UT) capsule, Take 1 capsule (125 mcg) by mouth once daily., Disp: , Rfl:     furosemide (Lasix) 20 mg tablet, Take 1 tablet (20 mg) by mouth once daily., Disp: 30 tablet, Rfl: 11    furosemide (Lasix) 20 mg tablet, Take 2 tablets (40 mg) by mouth once daily for 14 days., Disp: 28 tablet, Rfl: 0    gabapentin (Neurontin) 300 mg capsule, Take 1 capsule (300 mg) by mouth 3 times a day., Disp: 90 capsule, Rfl: 5    ibuprofen 400 mg tablet, 1 tablet with food or milk as needed Orally every 6 hrs  prn, Disp: , Rfl:     lisinopril 20 mg tablet, Take 1 tablet (20 mg) by mouth once daily., Disp: , Rfl:     melatonin 3 mg tablet, Take 1 tablet (3 mg) by mouth once daily at bedtime., Disp: , Rfl:     memantine (Namenda) 28 mg capsule,sprinkle,ER 24hr, Take 1 capsule (28 mg) by mouth once daily., Disp: , Rfl:     polyvinyl alcohol (Liquifilm Tears) 1.4 % ophthalmic solution, Administer 2 drops into both eyes 3 times a day as needed., Disp: , Rfl:     potassium chloride CR (Klor-Con) 10 mEq ER tablet, Take 1 tablet (10 mEq) by mouth once daily. Do not crush, chew, or split., Disp: 30 tablet, Rfl: 11    simvastatin (Zocor) 20 mg tablet, Take 1 tablet (20 mg) by mouth once daily., Disp: , Rfl:     tamsulosin (Flomax) 0.4 mg 24 hr capsule, Take 2 capsules (0.8 mg) by mouth once daily., Disp: 60 capsule, Rfl: 11    traZODone (Desyrel) 100 mg tablet, Take 1 tablet (100 mg) by mouth once daily at bedtime., Disp: 30 tablet, Rfl: 11    venlafaxine (Effexor)  50 mg tablet, Take 1 tablet (50 mg) by mouth once daily in the morning. Take before meals., Disp: , Rfl:      Review of Systems   Constitutional:  Negative for chills, fatigue and fever.   HENT:  Positive for rhinorrhea. Negative for dental problem, sinus pressure, sinus pain and trouble swallowing.         Positive for chronic sinus issues   Eyes:  Negative for pain and visual disturbance.   Respiratory:  Negative for cough, shortness of breath and wheezing.    Cardiovascular:  Positive for leg swelling. Negative for chest pain and palpitations.   Gastrointestinal:  Negative for abdominal distention, abdominal pain, constipation, diarrhea and nausea.   Endocrine: Negative for cold intolerance and heat intolerance.   Genitourinary:  Negative for difficulty urinating.   Musculoskeletal:  Negative for gait problem, joint swelling and myalgias.   Skin:  Negative for color change, pallor, rash and wound.   Allergic/Immunologic: Negative for environmental allergies and food allergies.   Neurological:  Negative for dizziness, seizures, weakness and headaches.        Positive for neruopathy   Hematological:  Negative for adenopathy. Does not bruise/bleed easily.   Psychiatric/Behavioral:  Negative for behavioral problems.         Positive for depression and dementia history   All other systems reviewed and are negative.      Objective   /84   Pulse 72   Temp 36.3 °C (97.4 °F)   SpO2 96%     Physical Exam  Constitutional:       General: He is not in acute distress.     Appearance: Normal appearance. He is not toxic-appearing.   HENT:      Head: Normocephalic and atraumatic.      Nose: Nose normal.      Mouth/Throat:      Mouth: Mucous membranes are moist.      Pharynx: Oropharynx is clear.   Eyes:      Extraocular Movements: Extraocular movements intact.      Conjunctiva/sclera: Conjunctivae normal.      Pupils: Pupils are equal, round, and reactive to light.   Cardiovascular:      Rate and Rhythm: Normal rate and  regular rhythm.      Pulses: Normal pulses.      Heart sounds: Normal heart sounds. No murmur heard.  Pulmonary:      Effort: Pulmonary effort is normal.      Breath sounds: Normal breath sounds. No wheezing.   Abdominal:      General: Abdomen is flat. Bowel sounds are normal.      Palpations: Abdomen is soft.   Musculoskeletal:         General: No swelling.      Cervical back: Neck supple.      Right lower leg: Edema present.      Left lower leg: Edema present.      Comments: +2 BLE Edema   Skin:     General: Skin is warm and dry.   Neurological:      General: No focal deficit present.      Mental Status: He is alert. Mental status is at baseline.      Cranial Nerves: No cranial nerve deficit.      Motor: No weakness.      Comments: Positive for neuropathy bilateral hands and feet   Psychiatric:         Mood and Affect: Mood normal.         Behavior: Behavior normal.         Thought Content: Thought content normal.         Judgment: Judgment normal.         Assessment/Plan   Problem List Items Addressed This Visit             ICD-10-CM       Cardiac and Vasculature     Patient to continue current medication regiment. Vascular surgery follow up has been recommended but patient continues to decline as he is not interested in pursuing surgery         AAA (abdominal aortic aneurysm) without rupture (CMS/[facility]) I71.40    Essential hypertension, benign I10       Mental Health     Stable. Patient continues to follow with Nithya Fitch CNP with [facility] health services         Anxiety F41.9    Major depressive disorder, recurrent severe without psychotic features (CMS/[facility]) F33.2       Neuro     Patient to continue with current gabapentin dosing. He is to continue with comfort measures and supportive care         Neuropathy G62.9       Symptoms and Signs     Will increase furosemide to 60mg for short term course and then will continue 40mg daily due to persistent issues with BLE edema. Patient to notify  provider for any persistent or worsening issues with edema on increased diuretic dosing. He has been instructed to monitor his weights daily, wear compression stockings, limit sodium and elevate legs as able         Bilateral leg edema - Primary R60.0    Relevant Medications    furosemide (Lasix) 20 mg tablet          Alyssa Valadez, APRN-CNP

## 2023-12-11 RX ORDER — BUDESONIDE 3 MG/1
3 CAPSULE, COATED PELLETS ORAL DAILY
Qty: 30 CAPSULE | Refills: 11 | Status: SHIPPED | OUTPATIENT
Start: 2023-12-11 | End: 2024-03-25 | Stop reason: SDUPTHER

## 2023-12-11 RX ORDER — AMLODIPINE BESYLATE 5 MG/1
5 TABLET ORAL DAILY
Qty: 30 TABLET | Refills: 11 | Status: SHIPPED | OUTPATIENT
Start: 2023-12-11 | End: 2024-03-25 | Stop reason: SDUPTHER

## 2023-12-11 RX ORDER — TRAZODONE HYDROCHLORIDE 100 MG/1
100 TABLET ORAL NIGHTLY
Qty: 30 TABLET | Refills: 11 | Status: SHIPPED | OUTPATIENT
Start: 2023-12-11 | End: 2024-03-25 | Stop reason: SDUPTHER

## 2023-12-12 VITALS
TEMPERATURE: 97.4 F | OXYGEN SATURATION: 96 % | DIASTOLIC BLOOD PRESSURE: 84 MMHG | HEART RATE: 72 BPM | SYSTOLIC BLOOD PRESSURE: 132 MMHG

## 2023-12-12 RX ORDER — FUROSEMIDE 20 MG/1
40 TABLET ORAL DAILY
Qty: 60 TABLET | Refills: 5 | Status: SHIPPED | OUTPATIENT
Start: 2023-12-12 | End: 2024-02-09 | Stop reason: HOSPADM

## 2023-12-12 ASSESSMENT — ENCOUNTER SYMPTOMS
SINUS PAIN: 0
SINUS PRESSURE: 0
DIFFICULTY URINATING: 0

## 2023-12-13 NOTE — ASSESSMENT & PLAN NOTE
Stable. Patient continues to follow with Nithya Fitch CNP with [facility] mental health services

## 2023-12-13 NOTE — ASSESSMENT & PLAN NOTE
Will increase furosemide to 60mg for short term course and then will continue 40mg daily due to persistent issues with BLE edema. Patient to notify provider for any persistent or worsening issues with edema on increased diuretic dosing. He has been instructed to monitor his weights daily, wear compression stockings, limit sodium and elevate legs as able

## 2023-12-15 ENCOUNTER — TELEPHONE (OUTPATIENT)
Dept: PRIMARY CARE | Facility: CLINIC | Age: 83
End: 2023-12-15
Payer: COMMERCIAL

## 2023-12-15 DIAGNOSIS — R60.0 BILATERAL LEG EDEMA: Primary | ICD-10-CM

## 2023-12-15 RX ORDER — FUROSEMIDE 20 MG/1
20 TABLET ORAL DAILY
Qty: 30 TABLET | Refills: 0 | Status: SHIPPED | OUTPATIENT
Start: 2023-12-15 | End: 2024-02-09 | Stop reason: HOSPADM

## 2023-12-15 NOTE — TELEPHONE ENCOUNTER
Updated patient that short-term prescription of furosemide has been sent to his location [facility]

## 2023-12-15 NOTE — TELEPHONE ENCOUNTER
Pt states he has been taking lasix as prescribed and swelling is much better. Pt states leg swelling is almost gone and foot swelling if really improved. He is asking how he should take medication going forward.

## 2023-12-18 ENCOUNTER — TELEPHONE (OUTPATIENT)
Dept: PRIMARY CARE | Facility: CLINIC | Age: 83
End: 2023-12-18
Payer: COMMERCIAL

## 2023-12-18 NOTE — TELEPHONE ENCOUNTER
Pt states he received extra meds in the mail from mail order stating it was the pills he was shorted. Pt states he is confused about his meds now because he gets pill packs. I asked pt if he was shorted with his last shipment. He states he is not sure and wants someone to come and straighten out his meds. Is this something you can help with? Thank you

## 2023-12-20 NOTE — TELEPHONE ENCOUNTER
Spoke with patient on phone. Stated he was confused because he was told Lasix Rx was sent to [facility]; however, when his friend went to  he was told they did not have a Rx. The same day he received medications from [facility], including the Lasix. He stated he understands how to take and verbalized the directions for use from the bottle. Stated he will have enough until his next box arrives from [facility]. Agreed to call [facility] office if further questions arise.

## 2023-12-21 ENCOUNTER — TELEPHONE (OUTPATIENT)
Dept: PRIMARY CARE | Facility: CLINIC | Age: 83
End: 2023-12-21
Payer: COMMERCIAL

## 2023-12-21 ENCOUNTER — HOSPITAL ENCOUNTER (EMERGENCY)
Facility: HOSPITAL | Age: 83
Discharge: HOME | End: 2023-12-21
Attending: STUDENT IN AN ORGANIZED HEALTH CARE EDUCATION/TRAINING PROGRAM
Payer: COMMERCIAL

## 2023-12-21 ENCOUNTER — APPOINTMENT (OUTPATIENT)
Dept: RADIOLOGY | Facility: HOSPITAL | Age: 83
End: 2023-12-21
Payer: COMMERCIAL

## 2023-12-21 VITALS
RESPIRATION RATE: 18 BRPM | HEIGHT: 74 IN | DIASTOLIC BLOOD PRESSURE: 95 MMHG | HEART RATE: 87 BPM | BODY MASS INDEX: 31.44 KG/M2 | WEIGHT: 245 LBS | SYSTOLIC BLOOD PRESSURE: 133 MMHG | TEMPERATURE: 97.7 F | OXYGEN SATURATION: 96 %

## 2023-12-21 DIAGNOSIS — R19.5 LOOSE STOOLS: ICD-10-CM

## 2023-12-21 DIAGNOSIS — I71.9: Primary | ICD-10-CM

## 2023-12-21 LAB
ABO GROUP (TYPE) IN BLOOD: NORMAL
ALBUMIN SERPL-MCNC: 4.1 G/DL (ref 3.5–5)
ALP BLD-CCNC: 95 U/L (ref 35–125)
ALT SERPL-CCNC: 10 U/L (ref 5–40)
ANION GAP SERPL CALC-SCNC: 18 MMOL/L
ANTIBODY SCREEN: NORMAL
AST SERPL-CCNC: 19 U/L (ref 5–40)
BASOPHILS # BLD AUTO: 0.03 X10*3/UL (ref 0–0.1)
BASOPHILS NFR BLD AUTO: 0.6 %
BILIRUB SERPL-MCNC: 0.5 MG/DL (ref 0.1–1.2)
BUN SERPL-MCNC: 10 MG/DL (ref 8–25)
CALCIUM SERPL-MCNC: 9.7 MG/DL (ref 8.5–10.4)
CHLORIDE SERPL-SCNC: 98 MMOL/L (ref 97–107)
CO2 SERPL-SCNC: 23 MMOL/L (ref 24–31)
CREAT SERPL-MCNC: 1.2 MG/DL (ref 0.4–1.6)
EOSINOPHIL # BLD AUTO: 0.03 X10*3/UL (ref 0–0.4)
EOSINOPHIL NFR BLD AUTO: 0.6 %
ERYTHROCYTE [DISTWIDTH] IN BLOOD BY AUTOMATED COUNT: 12.9 % (ref 11.5–14.5)
GFR SERPL CREATININE-BSD FRML MDRD: 60 ML/MIN/1.73M*2
GLUCOSE SERPL-MCNC: 140 MG/DL (ref 65–99)
HCT VFR BLD AUTO: 37.3 % (ref 41–52)
HGB BLD-MCNC: 13.2 G/DL (ref 13.5–17.5)
IMM GRANULOCYTES # BLD AUTO: 0.02 X10*3/UL (ref 0–0.5)
IMM GRANULOCYTES NFR BLD AUTO: 0.4 % (ref 0–0.9)
LYMPHOCYTES # BLD AUTO: 0.77 X10*3/UL (ref 0.8–3)
LYMPHOCYTES NFR BLD AUTO: 14.5 %
MAGNESIUM SERPL-MCNC: 1.9 MG/DL (ref 1.6–3.1)
MCH RBC QN AUTO: 35.1 PG (ref 26–34)
MCHC RBC AUTO-ENTMCNC: 35.4 G/DL (ref 32–36)
MCV RBC AUTO: 99 FL (ref 80–100)
MONOCYTES # BLD AUTO: 0.29 X10*3/UL (ref 0.05–0.8)
MONOCYTES NFR BLD AUTO: 5.5 %
NEUTROPHILS # BLD AUTO: 4.16 X10*3/UL (ref 1.6–5.5)
NEUTROPHILS NFR BLD AUTO: 78.4 %
NRBC BLD-RTO: 0 /100 WBCS (ref 0–0)
PLATELET # BLD AUTO: 242 X10*3/UL (ref 150–450)
POTASSIUM SERPL-SCNC: 3.5 MMOL/L (ref 3.4–5.1)
PROT SERPL-MCNC: 7.3 G/DL (ref 5.9–7.9)
RBC # BLD AUTO: 3.76 X10*6/UL (ref 4.5–5.9)
RH FACTOR (ANTIGEN D): NORMAL
SODIUM SERPL-SCNC: 139 MMOL/L (ref 133–145)
WBC # BLD AUTO: 5.3 X10*3/UL (ref 4.4–11.3)

## 2023-12-21 PROCEDURE — 99284 EMERGENCY DEPT VISIT MOD MDM: CPT | Mod: 25 | Performed by: STUDENT IN AN ORGANIZED HEALTH CARE EDUCATION/TRAINING PROGRAM

## 2023-12-21 PROCEDURE — 85025 COMPLETE CBC W/AUTO DIFF WBC: CPT | Performed by: PHYSICIAN ASSISTANT

## 2023-12-21 PROCEDURE — 2500000004 HC RX 250 GENERAL PHARMACY W/ HCPCS (ALT 636 FOR OP/ED): Performed by: PHYSICIAN ASSISTANT

## 2023-12-21 PROCEDURE — 83735 ASSAY OF MAGNESIUM: CPT | Performed by: PHYSICIAN ASSISTANT

## 2023-12-21 PROCEDURE — 2550000001 HC RX 255 CONTRASTS: Performed by: STUDENT IN AN ORGANIZED HEALTH CARE EDUCATION/TRAINING PROGRAM

## 2023-12-21 PROCEDURE — 82310 ASSAY OF CALCIUM: CPT | Performed by: PHYSICIAN ASSISTANT

## 2023-12-21 PROCEDURE — 96374 THER/PROPH/DIAG INJ IV PUSH: CPT

## 2023-12-21 PROCEDURE — 86900 BLOOD TYPING SEROLOGIC ABO: CPT | Performed by: PHYSICIAN ASSISTANT

## 2023-12-21 PROCEDURE — 74177 CT ABD & PELVIS W/CONTRAST: CPT

## 2023-12-21 PROCEDURE — 2550000001 HC RX 255 CONTRASTS: Performed by: PHYSICIAN ASSISTANT

## 2023-12-21 PROCEDURE — 36415 COLL VENOUS BLD VENIPUNCTURE: CPT | Performed by: PHYSICIAN ASSISTANT

## 2023-12-21 PROCEDURE — C9113 INJ PANTOPRAZOLE SODIUM, VIA: HCPCS | Performed by: PHYSICIAN ASSISTANT

## 2023-12-21 RX ORDER — PANTOPRAZOLE SODIUM 40 MG/10ML
40 INJECTION, POWDER, LYOPHILIZED, FOR SOLUTION INTRAVENOUS ONCE
Status: COMPLETED | OUTPATIENT
Start: 2023-12-21 | End: 2023-12-21

## 2023-12-21 RX ADMIN — IOHEXOL 75 ML: 350 INJECTION, SOLUTION INTRAVENOUS at 18:26

## 2023-12-21 RX ADMIN — PANTOPRAZOLE SODIUM 40 MG: 40 INJECTION, POWDER, FOR SOLUTION INTRAVENOUS at 17:13

## 2023-12-21 ASSESSMENT — PAIN SCALES - GENERAL
PAINLEVEL_OUTOF10: 0 - NO PAIN
PAINLEVEL_OUTOF10: 0 - NO PAIN

## 2023-12-21 ASSESSMENT — COLUMBIA-SUICIDE SEVERITY RATING SCALE - C-SSRS
1. IN THE PAST MONTH, HAVE YOU WISHED YOU WERE DEAD OR WISHED YOU COULD GO TO SLEEP AND NOT WAKE UP?: NO
6. HAVE YOU EVER DONE ANYTHING, STARTED TO DO ANYTHING, OR PREPARED TO DO ANYTHING TO END YOUR LIFE?: NO
2. HAVE YOU ACTUALLY HAD ANY THOUGHTS OF KILLING YOURSELF?: NO

## 2023-12-21 ASSESSMENT — PAIN - FUNCTIONAL ASSESSMENT: PAIN_FUNCTIONAL_ASSESSMENT: 0-10

## 2023-12-21 ASSESSMENT — PAIN DESCRIPTION - PROGRESSION: CLINICAL_PROGRESSION: NOT CHANGED

## 2023-12-21 NOTE — TELEPHONE ENCOUNTER
"Pt called stating he has had pitch black stool for the last 4-5 days. He denies nausea, vomiting, or abdominal pain. Pt states food is \"not doing it for him\". Pt states he has had this in the past and had to go to the hospital. I advised pt he needs to present to ER for eval. Pt agreed and stated he would call his neighbor to take him to ER.  "

## 2023-12-21 NOTE — ED PROVIDER NOTES
HPI   Chief Complaint   Patient presents with    Black or Bloody Stool     Patient states he's been having dark stool for 5 days, called EMS because he was weak       HPI  83-year-old male here for evaluation of possible GI bleed, 4 to 5-day history of dark stools, has had this before, has indicated that he has absolutely no abdominal pain.  Has indicated no nausea vomiting or recent illness.  Says that he is not on blood thinning medications.                  No data recorded                Patient History   Past Medical History:   Diagnosis Date    AAA (abdominal aortic aneurysm) (CMS/HCC)     BPH (benign prostatic hyperplasia)     Colitis     Dementia (CMS/HCC)     Depression     DJD (degenerative joint disease)     HTN (hypertension)     Hyperlipidemia     OA (osteoarthritis)     Suicidal ideations      Past Surgical History:   Procedure Laterality Date    ACHILLES TENDON SURGERY Right 12/2007    repair    MR HEAD ANGIO WO IV CONTRAST  10/21/2019    MR HEAD ANGIO WO IV CONTRAST [facility]    MR HEAD ANGIO WO IV CONTRAST  07/27/2023    MR HEAD ANGIO WO IV CONTRAST [facility]    MR NECK ANGIO WO IV CONTRAST  10/21/2019    MR NECK ANGIO WO IV CONTRAST [facility]    REVERSE TOTAL SHOULDER ARTHROPLASTY Left 05/2019    Dr. Edwards    ROTATOR CUFF REPAIR Right 11/2011    Dr. Edwards    TOTAL KNEE ARTHROPLASTY Bilateral     TRANSURETHRAL RESECTION OF PROSTATE  08/2018    GL TURP Dr. Lindsey    UMBILICAL HERNIA REPAIR       No family history on file.  Social History     Tobacco Use    Smoking status: Unknown    Smokeless tobacco: Not on file   Substance Use Topics    Alcohol use: Not on file    Drug use: Not on file       Physical Exam   ED Triage Vitals   Temp Pulse Resp BP   -- -- -- --      SpO2 Temp src Heart Rate Source Patient Position   -- -- -- --      BP Location FiO2 (%)     -- --       Physical Exam  PHYSICAL EXAMINATION    GENERAL APPEARANCE: Awake and alert.     VITAL  SIGNS: As per the nurses' triage record.     HEENT: Normocephalic, atraumatic. Extraocular muscles are intact. Pupils equal round and reactive to light. Conjunctiva are pink. Negative scleral icterus. Mucous membranes are moist. Tongue in the midline. Pharynx was without erythema or exudates, uvula midline    NECK: Soft Nontender and supple, full gross ROM, no meningeal signs.    CHEST: Nontender to palpation. Clear to auscultation bilaterally. No rales, rhonchi, or wheezing.     HEART: S1, S2. Regular rate and rhythm. No murmurs, gallops or rubs.  Strong and equal pulses in the extremities.     ABDOMEN: Soft, nontender, nondistended, positive bowel sounds, no palpable masses.    Rectal: External exam unremarkable.  The patient digital rectal examination with chaperone at bedside reflects some dark-colored loose stool of which Hemoccult negative.  No obvious hemorrhoid.    MUSCULOSKELETAL: The calves are nontender to palpation. Full gross active range of motion. Ambulating on own with no acute difficulties     NEUROLOGICAL: Awake, alert and oriented x 3. Power intact in the upper and lower extremities. Sensation is intact to light touch in the upper and lower extremities.     IMMUNOLOGICAL: No lymphatic streaking noted     DERM: No petechiae, rashes, or ecchymoses.  ED Course & MDM   ED Course as of 12/1940   Thu Dec 21, 2023   1812 On further questioning patient indicates that for the last several day he has been taking Pepto-Bismol.  Believe this is a source of his dark-colored stools.  But then he also brought forth additional information that was not previously volunteered.  He had indicated that he has been having decreased ability to keep things down and states that has been having some loose stools.  He initially stated that he was having dark stools but never indicated loose stools or decreased p.o. intake.  CT ordered for further evaluation [AP]   1850 I just had a extensive discussion with Dr. Greenwood.   As part of this conversation he wanted me to go specifically talk to the patient to see if he has been seen anywhere for this.  I did speak to the patient he stated that 2 of his family members passed away from aneurysms like this.  He says that he does not in any way want this fixed or repaired.  He states that he just wants to go on about his life and if this is what is going to kill him he is satisfied with that but does not want to go through the potential repair.  He understands that this has a very high likelihood of death and permanent life change without treatment.  I then conveyed this information to the vascular surgeon.  He stated that he would be more than happy to see and evaluate the patient if he is seeking treatment he is also more than willing to see them in the clinic if he wants to have an outpatient discussion on it but he also made it clear that the recommendation for most appropriate management would be immediate management.  Patient refusing [AP]      ED Course User Index  [AP] Alex Cm PA-C         Diagnoses as of 12/1940   Enlarging aortic aneurysm (CMS/HCC)   Loose stools       Medical Decision Making  Parts of this chart have been completed using voice recognition software. Please excuse any errors of transcription.  My thought process and reason for plan has been formulated from the time that I saw the patient until the time of disposition and is not specific to one specific moment during their visit and furthermore my MDM encompasses this entire chart and not only this text box.      HPI: Detailed above.    Exam: A medically appropriate exam performed, outlined above, given the known history and presentation.    History obtained from: The patient    Social Determinants of Health considered during this visit: Lives at home    Medications given during visit:  Medications   pantoprazole (ProtoNix) injection 40 mg (40 mg intravenous Given 12/21/23 1713)   iohexol  (OMNIPaque) 350 mg iodine/mL solution 75 mL (75 mL intravenous Given 12/21/23 1895)        Diagnostic/tests  Labs Reviewed   CBC WITH AUTO DIFFERENTIAL - Abnormal       Result Value    WBC 5.3      nRBC 0.0      RBC 3.76 (*)     Hemoglobin 13.2 (*)     Hematocrit 37.3 (*)     MCV 99      MCH 35.1 (*)     MCHC 35.4      RDW 12.9      Platelets 242      Neutrophils % 78.4      Immature Granulocytes %, Automated 0.4      Lymphocytes % 14.5      Monocytes % 5.5      Eosinophils % 0.6      Basophils % 0.6      Neutrophils Absolute 4.16      Immature Granulocytes Absolute, Automated 0.02      Lymphocytes Absolute 0.77 (*)     Monocytes Absolute 0.29      Eosinophils Absolute 0.03      Basophils Absolute 0.03     COMPREHENSIVE METABOLIC PANEL - Abnormal    Glucose 140 (*)     Sodium 139      Potassium 3.5      Chloride 98      Bicarbonate 23 (*)     Urea Nitrogen 10      Creatinine 1.20      eGFR 60 (*)     Calcium 9.7      Albumin 4.1      Alkaline Phosphatase 95      Total Protein 7.3      AST 19      Bilirubin, Total 0.5      ALT 10      Anion Gap 18     MAGNESIUM - Normal    Magnesium 1.90     OCCULT BLOOD X1, STOOL   TYPE AND SCREEN    ABO TYPE A      Rh TYPE POS      ANTIBODY SCREEN NEG        CT abdomen pelvis w IV contrast   Final Result   No acute abdominopelvic pathology.   Interval increase in size an infrarenal abdominal aortic aneurysm   measuring 7.9 x 7.5 cm, previously measuring 7 x 6 cm in 2022. There   is diffuse colonic diverticulosis without evidence of diverticulitis.        MACRO:   None        Signed by: Deyvi Tomlinson 12/21/2023 6:38 PM   Dictation workstation:   [id number]           Considerations/further MDM:  I estimate there is low risk for acute abdomen.  I have considered the following: acute appendicitis, bowel obstruction, cholecystitis, diverticulitis, incarcerated hernia, mesenteric ischemia, pancreatitis, acute liver failure, renal failure, UTI/Pyelonephritis to an extent that requires  admission and IV abx, perforated bowel, or ulcers.    Thus I consider the discharge disposition reasonable. Also, there is no evidence or peritonitis, sepsis, or toxicity. We have discussed the diagnosis and risks, and we agree with discharging home to follow-up with appropriate physician as directed. We also discussed returning to the Emergency Department immediately if new or worsening symptoms occur. We have discussed the symptoms which are most concerning (e.g., bloody stool, fever, changing or worsening pain, nausea, vomiting) that necessitate immediate return. Pt symptoms have been well controlled here with medications and the patient is lower risk for acute/surgical abdomen and is safe for discharge with appropriate outpatient follow up.  The patient has verbalized understanding to return to ER without delay for new or worsening pains or for any other symptoms or concerns.    Patient was found to have a very large aneurysm, I had an extensive discussion with the patient, this was identified in September 2022 on chart review and I spoke to the patient the patient made it very clear that he was told about this last year, he was told that it should be fixed, he stated that 2 of his family numbers have had the same thing, he is indicated that he is not interested in having this repaired, he made it clear that if this is the reason that he will ultimately die he is satisfied with that but he does not want to go through the repair process.  He fully understands that this has the potential to take his life and permanently change his lifestyle however states that it is not something he wants to pursue repair or treatment for.  Otherwise the rest of his labs and diagnostics are largely unremarkable.  He was hypertensive and he takes medication for this.  I did discuss with him the increased risk of the aneurysm especially with the hypertension he has verbalized understanding states that he is been told that as well.   He understands that he can change his mind and he can pursue treatment at any time.  And I did offer and recommend transfer and immediate vascular evaluation however he has refused.  In regard to his primary complaint today his dark stool was Hemoccult negative, I do believe that this is likely more so related to the Pepto-Bismol that he was taking.  I discussed with him that this can cause this.  He stated that he was unaware of that and thanked me for that information.  He says he is feeling well and would like to go home otherwise.  He has been up and ambulatory and does not appear to be in any acute distress otherwise.  Recommend that he maintain his usual medication regimen including blood pressure management and I have indicated that he should at the very least call tomorrow morning and follow-up with the vascular surgeon at least to further discuss treatment options for his aneurysm he stated that he likely will not do so but appreciated the information.      Procedure  Procedures     Alex Cm PA-C  12/21/23 1947       Alex Cm PA-C  12/21/23 1951

## 2023-12-22 NOTE — DISCHARGE INSTRUCTIONS
You have a very large aortic aneurysm.  This is something that has the potential to permanently change and/or end her life, you have refused any type of treatment for this, if you change your mind you are always welcome back in the emergency department.  Otherwise I recommend follow-up with your family doctor, you may also follow-up with a vascular surgeon on outpatient basis however as we have discussed your aneurysm is a very time sensitive diagnosis that has the potential of immediate and permanent disability.

## 2023-12-22 NOTE — ED PROVIDER NOTES
Supervisory note:  Patient seen in conjunction with LEIGHTON Goss.  Patient presents with concern for blood in the stool as well as multiple episodes of diarrhea.  He has been taking Pepto-Bismol for his diarrhea.  He states that he had an episode where he was not able to make it to the bathroom and he spent the next day and a half cleaning diarrhea.  He has been eating and drinking however.  Hemoccult test is negative.  Laboratory studies are negative.  CAT scan reveals a very large AAA but is otherwise unremarkable.  Patient was advised of this finding and of need for surgical repair, but he declines.  Patient is judged to be capable of making medical decisions.  Patient was advised of risks of death and permanent disability and of the high likelihood of death secondary to this aneurysm.  Patient continues to decline intervention for AAA.  Patient advised to drink extra fluids and return to the emergency department with any signs of dehydration.  Patient discharged with informed refusal of care.  I personally saw the patient and made/approved the management plan and take responsiblity for the patient management.  Parts of this chart were completed with dictation software, please excuse any errors in transcription.     Alirio Bourgeois MD  12/21/23 1948

## 2024-01-18 DIAGNOSIS — J31.0 CHRONIC RHINITIS: ICD-10-CM

## 2024-01-18 DIAGNOSIS — I10 ESSENTIAL HYPERTENSION, BENIGN: ICD-10-CM

## 2024-01-18 RX ORDER — LISINOPRIL 20 MG/1
20 TABLET ORAL DAILY
Qty: 30 TABLET | Refills: 11 | Status: SHIPPED | OUTPATIENT
Start: 2024-01-18 | End: 2024-02-09 | Stop reason: HOSPADM

## 2024-01-18 RX ORDER — AZELASTINE 1 MG/ML
1 SPRAY, METERED NASAL 2 TIMES DAILY
Qty: 30 ML | Refills: 11 | Status: SHIPPED | OUTPATIENT
Start: 2024-01-18 | End: 2024-03-25 | Stop reason: SDUPTHER

## 2024-02-02 ENCOUNTER — TELEPHONE (OUTPATIENT)
Dept: PRIMARY CARE | Facility: CLINIC | Age: 84
End: 2024-02-02
Payer: COMMERCIAL

## 2024-02-05 ENCOUNTER — APPOINTMENT (OUTPATIENT)
Dept: RADIOLOGY | Facility: HOSPITAL | Age: 84
DRG: 683 | End: 2024-02-05
Payer: MEDICARE

## 2024-02-05 ENCOUNTER — APPOINTMENT (OUTPATIENT)
Dept: CARDIOLOGY | Facility: HOSPITAL | Age: 84
DRG: 683 | End: 2024-02-05
Payer: MEDICARE

## 2024-02-05 ENCOUNTER — HOSPITAL ENCOUNTER (INPATIENT)
Facility: HOSPITAL | Age: 84
LOS: 2 days | Discharge: HOME | DRG: 683 | End: 2024-02-09
Attending: STUDENT IN AN ORGANIZED HEALTH CARE EDUCATION/TRAINING PROGRAM | Admitting: INTERNAL MEDICINE
Payer: MEDICARE

## 2024-02-05 ENCOUNTER — OFFICE VISIT (OUTPATIENT)
Dept: PRIMARY CARE | Facility: CLINIC | Age: 84
DRG: 683 | End: 2024-02-05
Payer: MEDICARE

## 2024-02-05 VITALS
DIASTOLIC BLOOD PRESSURE: 44 MMHG | HEART RATE: 76 BPM | SYSTOLIC BLOOD PRESSURE: 84 MMHG | TEMPERATURE: 97.3 F | OXYGEN SATURATION: 99 %

## 2024-02-05 DIAGNOSIS — R53.1 GENERALIZED WEAKNESS: ICD-10-CM

## 2024-02-05 DIAGNOSIS — M15.9 PRIMARY OSTEOARTHRITIS INVOLVING MULTIPLE JOINTS: ICD-10-CM

## 2024-02-05 DIAGNOSIS — I95.9 HYPOTENSION, UNSPECIFIED HYPOTENSION TYPE: Primary | ICD-10-CM

## 2024-02-05 DIAGNOSIS — R42 DIZZINESS: ICD-10-CM

## 2024-02-05 DIAGNOSIS — G40.909 SEIZURE DISORDER (MULTI): ICD-10-CM

## 2024-02-05 DIAGNOSIS — I71.40 ABDOMINAL AORTIC ANEURYSM (AAA) WITHOUT RUPTURE, UNSPECIFIED PART (CMS-HCC): ICD-10-CM

## 2024-02-05 DIAGNOSIS — N17.9 ACUTE KIDNEY INJURY (CMS-HCC): Primary | ICD-10-CM

## 2024-02-05 LAB
ALBUMIN SERPL-MCNC: 3.6 G/DL (ref 3.5–5)
ALP BLD-CCNC: 101 U/L (ref 35–125)
ALT SERPL-CCNC: 7 U/L (ref 5–40)
ANION GAP SERPL CALC-SCNC: 13 MMOL/L
APPEARANCE UR: CLEAR
AST SERPL-CCNC: 11 U/L (ref 5–40)
BASOPHILS # BLD AUTO: 0.05 X10*3/UL (ref 0–0.1)
BASOPHILS NFR BLD AUTO: 0.8 %
BILIRUB SERPL-MCNC: 0.3 MG/DL (ref 0.1–1.2)
BILIRUB UR STRIP.AUTO-MCNC: NEGATIVE MG/DL
BUN SERPL-MCNC: 25 MG/DL (ref 8–25)
CALCIUM SERPL-MCNC: 9.2 MG/DL (ref 8.5–10.4)
CHLORIDE SERPL-SCNC: 103 MMOL/L (ref 97–107)
CO2 SERPL-SCNC: 23 MMOL/L (ref 24–31)
COLOR UR: NORMAL
CREAT SERPL-MCNC: 2.2 MG/DL (ref 0.4–1.6)
EGFRCR SERPLBLD CKD-EPI 2021: 29 ML/MIN/1.73M*2
EOSINOPHIL # BLD AUTO: 0.04 X10*3/UL (ref 0–0.4)
EOSINOPHIL NFR BLD AUTO: 0.6 %
ERYTHROCYTE [DISTWIDTH] IN BLOOD BY AUTOMATED COUNT: 12.3 % (ref 11.5–14.5)
FLUAV RNA RESP QL NAA+PROBE: NOT DETECTED
FLUBV RNA RESP QL NAA+PROBE: NOT DETECTED
GLUCOSE SERPL-MCNC: 116 MG/DL (ref 65–99)
GLUCOSE UR STRIP.AUTO-MCNC: NORMAL MG/DL
HCT VFR BLD AUTO: 34.8 % (ref 41–52)
HGB BLD-MCNC: 12 G/DL (ref 13.5–17.5)
IMM GRANULOCYTES # BLD AUTO: 0.04 X10*3/UL (ref 0–0.5)
IMM GRANULOCYTES NFR BLD AUTO: 0.6 % (ref 0–0.9)
KETONES UR STRIP.AUTO-MCNC: NEGATIVE MG/DL
LEUKOCYTE ESTERASE UR QL STRIP.AUTO: NEGATIVE
LYMPHOCYTES # BLD AUTO: 0.98 X10*3/UL (ref 0.8–3)
LYMPHOCYTES NFR BLD AUTO: 15.2 %
MCH RBC QN AUTO: 34.3 PG (ref 26–34)
MCHC RBC AUTO-ENTMCNC: 34.5 G/DL (ref 32–36)
MCV RBC AUTO: 99 FL (ref 80–100)
MONOCYTES # BLD AUTO: 0.37 X10*3/UL (ref 0.05–0.8)
MONOCYTES NFR BLD AUTO: 5.7 %
NEUTROPHILS # BLD AUTO: 4.96 X10*3/UL (ref 1.6–5.5)
NEUTROPHILS NFR BLD AUTO: 77.1 %
NITRITE UR QL STRIP.AUTO: NEGATIVE
NRBC BLD-RTO: 0 /100 WBCS (ref 0–0)
PH UR STRIP.AUTO: 5 [PH]
PLATELET # BLD AUTO: 199 X10*3/UL (ref 150–450)
POTASSIUM SERPL-SCNC: 4 MMOL/L (ref 3.4–5.1)
PROT SERPL-MCNC: 6.5 G/DL (ref 5.9–7.9)
PROT UR STRIP.AUTO-MCNC: NEGATIVE MG/DL
RBC # BLD AUTO: 3.5 X10*6/UL (ref 4.5–5.9)
RBC # UR STRIP.AUTO: NEGATIVE /UL
SARS-COV-2 RNA RESP QL NAA+PROBE: NOT DETECTED
SODIUM SERPL-SCNC: 139 MMOL/L (ref 133–145)
SP GR UR STRIP.AUTO: 1.01
TROPONIN T SERPL-MCNC: 53 NG/L
TROPONIN T SERPL-MCNC: 57 NG/L
UROBILINOGEN UR STRIP.AUTO-MCNC: NORMAL MG/DL
WBC # BLD AUTO: 6.4 X10*3/UL (ref 4.4–11.3)

## 2024-02-05 PROCEDURE — 2500000001 HC RX 250 WO HCPCS SELF ADMINISTERED DRUGS (ALT 637 FOR MEDICARE OP): Performed by: FAMILY MEDICINE

## 2024-02-05 PROCEDURE — 93005 ELECTROCARDIOGRAM TRACING: CPT

## 2024-02-05 PROCEDURE — 3078F DIAST BP <80 MM HG: CPT | Performed by: NURSE PRACTITIONER

## 2024-02-05 PROCEDURE — 3074F SYST BP LT 130 MM HG: CPT | Performed by: NURSE PRACTITIONER

## 2024-02-05 PROCEDURE — 2500000004 HC RX 250 GENERAL PHARMACY W/ HCPCS (ALT 636 FOR OP/ED): Performed by: PHYSICIAN ASSISTANT

## 2024-02-05 PROCEDURE — 93010 ELECTROCARDIOGRAM REPORT: CPT | Performed by: INTERNAL MEDICINE

## 2024-02-05 PROCEDURE — 84484 ASSAY OF TROPONIN QUANT: CPT

## 2024-02-05 PROCEDURE — 81003 URINALYSIS AUTO W/O SCOPE: CPT

## 2024-02-05 PROCEDURE — 36415 COLL VENOUS BLD VENIPUNCTURE: CPT

## 2024-02-05 PROCEDURE — G0378 HOSPITAL OBSERVATION PER HR: HCPCS

## 2024-02-05 PROCEDURE — 2500000004 HC RX 250 GENERAL PHARMACY W/ HCPCS (ALT 636 FOR OP/ED): Performed by: FAMILY MEDICINE

## 2024-02-05 PROCEDURE — 71046 X-RAY EXAM CHEST 2 VIEWS: CPT

## 2024-02-05 PROCEDURE — 1125F AMNT PAIN NOTED PAIN PRSNT: CPT | Performed by: NURSE PRACTITIONER

## 2024-02-05 PROCEDURE — 2500000004 HC RX 250 GENERAL PHARMACY W/ HCPCS (ALT 636 FOR OP/ED)

## 2024-02-05 PROCEDURE — 99285 EMERGENCY DEPT VISIT HI MDM: CPT | Mod: 25 | Performed by: STUDENT IN AN ORGANIZED HEALTH CARE EDUCATION/TRAINING PROGRAM

## 2024-02-05 PROCEDURE — 87636 SARSCOV2 & INF A&B AMP PRB: CPT

## 2024-02-05 PROCEDURE — 80053 COMPREHEN METABOLIC PANEL: CPT

## 2024-02-05 PROCEDURE — 85025 COMPLETE CBC W/AUTO DIFF WBC: CPT

## 2024-02-05 PROCEDURE — 96360 HYDRATION IV INFUSION INIT: CPT

## 2024-02-05 PROCEDURE — 99348 HOME/RES VST EST LOW MDM 30: CPT | Performed by: NURSE PRACTITIONER

## 2024-02-05 RX ORDER — SODIUM CHLORIDE 9 MG/ML
100 INJECTION, SOLUTION INTRAVENOUS CONTINUOUS
Status: DISCONTINUED | OUTPATIENT
Start: 2024-02-05 | End: 2024-02-07

## 2024-02-05 RX ORDER — TRAZODONE HYDROCHLORIDE 100 MG/1
100 TABLET ORAL NIGHTLY
Status: DISCONTINUED | OUTPATIENT
Start: 2024-02-05 | End: 2024-02-09 | Stop reason: HOSPADM

## 2024-02-05 RX ORDER — SIMVASTATIN 20 MG/1
20 TABLET, FILM COATED ORAL NIGHTLY
Status: DISCONTINUED | OUTPATIENT
Start: 2024-02-05 | End: 2024-02-09 | Stop reason: HOSPADM

## 2024-02-05 RX ORDER — GABAPENTIN 100 MG/1
100 CAPSULE ORAL 3 TIMES DAILY
Status: DISCONTINUED | OUTPATIENT
Start: 2024-02-05 | End: 2024-02-09 | Stop reason: HOSPADM

## 2024-02-05 RX ORDER — ENOXAPARIN SODIUM 100 MG/ML
40 INJECTION SUBCUTANEOUS DAILY
Status: DISCONTINUED | OUTPATIENT
Start: 2024-02-05 | End: 2024-02-06

## 2024-02-05 RX ORDER — BUDESONIDE 3 MG/1
3 CAPSULE, COATED PELLETS ORAL DAILY
Status: DISCONTINUED | OUTPATIENT
Start: 2024-02-06 | End: 2024-02-09 | Stop reason: HOSPADM

## 2024-02-05 RX ORDER — AZELASTINE 1 MG/ML
1 SPRAY, METERED NASAL 2 TIMES DAILY
Status: DISCONTINUED | OUTPATIENT
Start: 2024-02-05 | End: 2024-02-09 | Stop reason: HOSPADM

## 2024-02-05 RX ORDER — TALC
3 POWDER (GRAM) TOPICAL NIGHTLY
Status: DISCONTINUED | OUTPATIENT
Start: 2024-02-05 | End: 2024-02-09 | Stop reason: HOSPADM

## 2024-02-05 RX ORDER — MEMANTINE HYDROCHLORIDE 5 MG/1
2.5 TABLET ORAL 2 TIMES DAILY
Status: DISCONTINUED | OUTPATIENT
Start: 2024-02-05 | End: 2024-02-09 | Stop reason: HOSPADM

## 2024-02-05 RX ORDER — LORATADINE 10 MG/1
10 TABLET ORAL DAILY
Status: DISCONTINUED | OUTPATIENT
Start: 2024-02-06 | End: 2024-02-09 | Stop reason: HOSPADM

## 2024-02-05 RX ORDER — POLYVINYL ALCOHOL 14 MG/ML
2 SOLUTION/ DROPS OPHTHALMIC 3 TIMES DAILY PRN
Status: DISCONTINUED | OUTPATIENT
Start: 2024-02-05 | End: 2024-02-09 | Stop reason: HOSPADM

## 2024-02-05 RX ORDER — VENLAFAXINE 50 MG/1
50 TABLET ORAL
Status: DISCONTINUED | OUTPATIENT
Start: 2024-02-06 | End: 2024-02-09 | Stop reason: HOSPADM

## 2024-02-05 RX ADMIN — SODIUM CHLORIDE 1000 ML: 9 INJECTION, SOLUTION INTRAVENOUS at 19:05

## 2024-02-05 RX ADMIN — GABAPENTIN 100 MG: 100 CAPSULE ORAL at 22:37

## 2024-02-05 RX ADMIN — TRAZODONE HYDROCHLORIDE 100 MG: 100 TABLET ORAL at 22:37

## 2024-02-05 RX ADMIN — AZELASTINE HYDROCHLORIDE 1 SPRAY: 137 SPRAY, METERED NASAL at 22:36

## 2024-02-05 RX ADMIN — SODIUM CHLORIDE 1000 ML: 9 INJECTION, SOLUTION INTRAVENOUS at 14:45

## 2024-02-05 RX ADMIN — Medication 3 MG: at 22:37

## 2024-02-05 RX ADMIN — MEMANTINE 2.5 MG: 5 TABLET ORAL at 22:38

## 2024-02-05 RX ADMIN — SODIUM CHLORIDE 100 ML/HR: 900 INJECTION, SOLUTION INTRAVENOUS at 21:23

## 2024-02-05 RX ADMIN — ENOXAPARIN SODIUM 40 MG: 40 INJECTION SUBCUTANEOUS at 22:37

## 2024-02-05 RX ADMIN — SIMVASTATIN 20 MG: 20 TABLET, FILM COATED ORAL at 22:37

## 2024-02-05 SDOH — SOCIAL STABILITY: SOCIAL INSECURITY: DOES ANYONE TRY TO KEEP YOU FROM HAVING/CONTACTING OTHER FRIENDS OR DOING THINGS OUTSIDE YOUR HOME?: NO

## 2024-02-05 SDOH — SOCIAL STABILITY: SOCIAL NETWORK: ARE YOU MARRIED, WIDOWED, DIVORCED, SEPARATED, NEVER MARRIED, OR LIVING WITH A PARTNER?: WIDOWED

## 2024-02-05 SDOH — SOCIAL STABILITY: SOCIAL INSECURITY: DO YOU FEEL UNSAFE GOING BACK TO THE PLACE WHERE YOU ARE LIVING?: NO

## 2024-02-05 SDOH — SOCIAL STABILITY: SOCIAL INSECURITY
WITHIN THE LAST YEAR, HAVE TO BEEN RAPED OR FORCED TO HAVE ANY KIND OF SEXUAL ACTIVITY BY YOUR PARTNER OR EX-PARTNER?: NO

## 2024-02-05 SDOH — HEALTH STABILITY: MENTAL HEALTH
STRESS IS WHEN SOMEONE FEELS TENSE, NERVOUS, ANXIOUS, OR CAN'T SLEEP AT NIGHT BECAUSE THEIR MIND IS TROUBLED. HOW STRESSED ARE YOU?: ONLY A LITTLE

## 2024-02-05 SDOH — ECONOMIC STABILITY: INCOME INSECURITY: IN THE PAST 12 MONTHS, HAS THE ELECTRIC, GAS, OIL, OR WATER COMPANY THREATENED TO SHUT OFF SERVICE IN YOUR HOME?: NO

## 2024-02-05 SDOH — ECONOMIC STABILITY: FOOD INSECURITY: WITHIN THE PAST 12 MONTHS, YOU WORRIED THAT YOUR FOOD WOULD RUN OUT BEFORE YOU GOT MONEY TO BUY MORE.: NEVER TRUE

## 2024-02-05 SDOH — HEALTH STABILITY: MENTAL HEALTH: HOW OFTEN DO YOU HAVE 6 OR MORE DRINKS ON ONE OCCASION?: NEVER

## 2024-02-05 SDOH — SOCIAL STABILITY: SOCIAL NETWORK
DO YOU BELONG TO ANY CLUBS OR ORGANIZATIONS SUCH AS CHURCH GROUPS UNIONS, FRATERNAL OR ATHLETIC GROUPS, OR SCHOOL GROUPS?: NO

## 2024-02-05 SDOH — SOCIAL STABILITY: SOCIAL INSECURITY
WITHIN THE LAST YEAR, HAVE YOU BEEN KICKED, HIT, SLAPPED, OR OTHERWISE PHYSICALLY HURT BY YOUR PARTNER OR EX-PARTNER?: NO

## 2024-02-05 SDOH — HEALTH STABILITY: PHYSICAL HEALTH: ON AVERAGE, HOW MANY MINUTES DO YOU ENGAGE IN EXERCISE AT THIS LEVEL?: 0 MIN

## 2024-02-05 SDOH — SOCIAL STABILITY: SOCIAL INSECURITY: ABUSE: ADULT

## 2024-02-05 SDOH — HEALTH STABILITY: MENTAL HEALTH: HOW MANY STANDARD DRINKS CONTAINING ALCOHOL DO YOU HAVE ON A TYPICAL DAY?: 1 OR 2

## 2024-02-05 SDOH — SOCIAL STABILITY: SOCIAL INSECURITY: HAVE YOU HAD THOUGHTS OF HARMING ANYONE ELSE?: NO

## 2024-02-05 SDOH — SOCIAL STABILITY: SOCIAL INSECURITY: WITHIN THE LAST YEAR, HAVE YOU BEEN AFRAID OF YOUR PARTNER OR EX-PARTNER?: NO

## 2024-02-05 SDOH — SOCIAL STABILITY: SOCIAL INSECURITY: ARE YOU OR HAVE YOU BEEN THREATENED OR ABUSED PHYSICALLY, EMOTIONALLY, OR SEXUALLY BY ANYONE?: NO

## 2024-02-05 SDOH — SOCIAL STABILITY: SOCIAL INSECURITY: WITHIN THE LAST YEAR, HAVE YOU BEEN HUMILIATED OR EMOTIONALLY ABUSED IN OTHER WAYS BY YOUR PARTNER OR EX-PARTNER?: NO

## 2024-02-05 SDOH — SOCIAL STABILITY: SOCIAL NETWORK: IN A TYPICAL WEEK, HOW MANY TIMES DO YOU TALK ON THE PHONE WITH FAMILY, FRIENDS, OR NEIGHBORS?: THREE TIMES A WEEK

## 2024-02-05 SDOH — ECONOMIC STABILITY: FOOD INSECURITY: WITHIN THE PAST 12 MONTHS, THE FOOD YOU BOUGHT JUST DIDN'T LAST AND YOU DIDN'T HAVE MONEY TO GET MORE.: NEVER TRUE

## 2024-02-05 SDOH — HEALTH STABILITY: PHYSICAL HEALTH: ON AVERAGE, HOW MANY DAYS PER WEEK DO YOU ENGAGE IN MODERATE TO STRENUOUS EXERCISE (LIKE A BRISK WALK)?: 0 DAYS

## 2024-02-05 SDOH — SOCIAL STABILITY: SOCIAL INSECURITY: DO YOU FEEL ANYONE HAS EXPLOITED OR TAKEN ADVANTAGE OF YOU FINANCIALLY OR OF YOUR PERSONAL PROPERTY?: NO

## 2024-02-05 SDOH — HEALTH STABILITY: MENTAL HEALTH: HOW OFTEN DO YOU HAVE A DRINK CONTAINING ALCOHOL?: 2-4 TIMES A MONTH

## 2024-02-05 SDOH — SOCIAL STABILITY: SOCIAL NETWORK: HOW OFTEN DO YOU ATTEND CHURCH OR RELIGIOUS SERVICES?: NEVER

## 2024-02-05 SDOH — SOCIAL STABILITY: SOCIAL INSECURITY: ARE THERE ANY APPARENT SIGNS OF INJURIES/BEHAVIORS THAT COULD BE RELATED TO ABUSE/NEGLECT?: NO

## 2024-02-05 SDOH — SOCIAL STABILITY: SOCIAL INSECURITY: HAS ANYONE EVER THREATENED TO HURT YOUR FAMILY OR YOUR PETS?: NO

## 2024-02-05 SDOH — SOCIAL STABILITY: SOCIAL NETWORK: HOW OFTEN DO YOU ATTENT MEETINGS OF THE CLUB OR ORGANIZATION YOU BELONG TO?: NEVER

## 2024-02-05 SDOH — SOCIAL STABILITY: SOCIAL INSECURITY: WERE YOU ABLE TO COMPLETE ALL THE BEHAVIORAL HEALTH SCREENINGS?: YES

## 2024-02-05 SDOH — SOCIAL STABILITY: SOCIAL NETWORK: HOW OFTEN DO YOU GET TOGETHER WITH FRIENDS OR RELATIVES?: THREE TIMES A WEEK

## 2024-02-05 ASSESSMENT — COGNITIVE AND FUNCTIONAL STATUS - GENERAL
MOVING TO AND FROM BED TO CHAIR: A LITTLE
DRESSING REGULAR UPPER BODY CLOTHING: A LITTLE
DAILY ACTIVITIY SCORE: 19
PATIENT BASELINE BEDBOUND: NO
PERSONAL GROOMING: A LITTLE
TOILETING: A LITTLE
MOBILITY SCORE: 17
TURNING FROM BACK TO SIDE WHILE IN FLAT BAD: A LITTLE
HELP NEEDED FOR BATHING: A LITTLE
CLIMB 3 TO 5 STEPS WITH RAILING: A LOT
DRESSING REGULAR LOWER BODY CLOTHING: A LITTLE
MOVING FROM LYING ON BACK TO SITTING ON SIDE OF FLAT BED WITH BEDRAILS: A LITTLE
STANDING UP FROM CHAIR USING ARMS: A LITTLE
WALKING IN HOSPITAL ROOM: A LITTLE

## 2024-02-05 ASSESSMENT — PAIN SCALES - GENERAL
PAINLEVEL_OUTOF10: 0 - NO PAIN
PAINLEVEL: 5
PAINLEVEL_OUTOF10: 0 - NO PAIN
PAINLEVEL_OUTOF10: 0 - NO PAIN

## 2024-02-05 ASSESSMENT — PAIN - FUNCTIONAL ASSESSMENT
PAIN_FUNCTIONAL_ASSESSMENT: 0-10
PAIN_FUNCTIONAL_ASSESSMENT: 0-10

## 2024-02-05 ASSESSMENT — PATIENT HEALTH QUESTIONNAIRE - PHQ9
SUM OF ALL RESPONSES TO PHQ9 QUESTIONS 1 & 2: 0
2. FEELING DOWN, DEPRESSED OR HOPELESS: NOT AT ALL
1. LITTLE INTEREST OR PLEASURE IN DOING THINGS: NOT AT ALL

## 2024-02-05 ASSESSMENT — LIFESTYLE VARIABLES
SKIP TO QUESTIONS 9-10: 1
AUDIT-C TOTAL SCORE: 2

## 2024-02-05 ASSESSMENT — ACTIVITIES OF DAILY LIVING (ADL)
BATHING: NEEDS ASSISTANCE
TOILETING: NEEDS ASSISTANCE
JUDGMENT_ADEQUATE_SAFELY_COMPLETE_DAILY_ACTIVITIES: YES
HEARING - RIGHT EAR: DIFFICULTY WITH NOISE
FEEDING YOURSELF: INDEPENDENT
GROOMING: NEEDS ASSISTANCE
ADEQUATE_TO_COMPLETE_ADL: YES
WALKS IN HOME: NEEDS ASSISTANCE
LACK_OF_TRANSPORTATION: NO
HEARING - LEFT EAR: DIFFICULTY WITH NOISE
DRESSING YOURSELF: NEEDS ASSISTANCE
ASSISTIVE_DEVICE: WALKER
PATIENT'S MEMORY ADEQUATE TO SAFELY COMPLETE DAILY ACTIVITIES?: YES

## 2024-02-05 ASSESSMENT — ENCOUNTER SYMPTOMS
HEADACHES: 0
SINUS PAIN: 0
SEIZURES: 0
SINUS PRESSURE: 0
CHILLS: 0
FEVER: 0
COUGH: 0
RHINORRHEA: 1
ADENOPATHY: 0
EYE PAIN: 0
CONSTIPATION: 0
DIARRHEA: 0
MYALGIAS: 0
WOUND: 0
SHORTNESS OF BREATH: 0
ABDOMINAL PAIN: 0
DIZZINESS: 1
APPETITE CHANGE: 1
JOINT SWELLING: 0
COLOR CHANGE: 0
WEAKNESS: 1
DIFFICULTY URINATING: 0
PALPITATIONS: 0
BRUISES/BLEEDS EASILY: 0
WHEEZING: 0
NAUSEA: 0
ABDOMINAL DISTENTION: 0
FATIGUE: 0
TROUBLE SWALLOWING: 0

## 2024-02-05 ASSESSMENT — COLUMBIA-SUICIDE SEVERITY RATING SCALE - C-SSRS
2. HAVE YOU ACTUALLY HAD ANY THOUGHTS OF KILLING YOURSELF?: NO
1. IN THE PAST MONTH, HAVE YOU WISHED YOU WERE DEAD OR WISHED YOU COULD GO TO SLEEP AND NOT WAKE UP?: NO
6. HAVE YOU EVER DONE ANYTHING, STARTED TO DO ANYTHING, OR PREPARED TO DO ANYTHING TO END YOUR LIFE?: NO

## 2024-02-05 NOTE — PROGRESS NOTES
Subjective   Patient ID: Demarco Gudino is a 83 y.o. male who presents for Routine Follow-up.    HPI   Patient seen up in recliner, somewhat uncomfortable appearing. He states that he has not been feeling himself for the past several weeks. Patient admits to increased weakness and dizziness with position changes. He denies any recent falls, fevers, chills or other systemic concerns. Appetite reported to be poor as he reports getting dizzy when making meals.    Patient continues to reside in a single family home by himself as his wife remains in LTC. He still does not drive but his neighbors continue to assist more with groceries and other needed errands. His nephew is his only other relative and is no longer very involved with his care. Medications are delivered through the mail ([facility]). Patient remains independent with ADLs; he states that his nephew manages his finances and pays all of his bills. Patient does the majroity of his own cooking in addition to recieving Meals on Wheels. He is also working with a  through [employer].     Depression/ Dementia/ Suicide attempt Hx - Stable. Patient denies any current SI and continues to follow routinely with [facility] for mental health services    AAA - Surgical repair was originally recommended in 09/2022. Patient has not yet followed with a vascular surgeon and has no intentions to at this time.      Current Outpatient Medications:     Allergy Relief, loratadine, 10 mg tablet, Take 1 tablet (10 mg) by mouth once daily., Disp: , Rfl:     amLODIPine (Norvasc) 5 mg tablet, Take 1 tablet (5 mg) by mouth once daily., Disp: 30 tablet, Rfl: 11    azelastine (Astelin) 137 mcg (0.1 %) nasal spray, Administer 1 spray into each nostril 2 times a day., Disp: 30 mL, Rfl: 11    azelastine-fluticasone (Dymista) 137-50 mcg/spray nasal spray, Administer 1 spray into each nostril 2 times a day., Disp: , Rfl:     budesonide EC (Entocort EC) 3 mg 24 hr capsule,  Take 1 capsule (3 mg) by mouth once daily., Disp: 30 capsule, Rfl: 11    cholecalciferol (Vitamin D-3) 125 MCG (5000 UT) capsule, Take 1 capsule (125 mcg) by mouth once daily., Disp: , Rfl:     furosemide (Lasix) 20 mg tablet, Take 2 tablets (40 mg) by mouth once daily., Disp: 60 tablet, Rfl: 5    furosemide (Lasix) 20 mg tablet, Take 1 tablet (20 mg) by mouth once daily., Disp: 30 tablet, Rfl: 0    gabapentin (Neurontin) 300 mg capsule, Take 1 capsule (300 mg) by mouth 3 times a day., Disp: 90 capsule, Rfl: 5    ibuprofen 400 mg tablet, 1 tablet with food or milk as needed Orally every 6 hrs  prn, Disp: , Rfl:     lisinopril 20 mg tablet, Take 1 tablet (20 mg) by mouth once daily., Disp: 30 tablet, Rfl: 11    melatonin 3 mg tablet, Take 1 tablet (3 mg) by mouth once daily at bedtime., Disp: , Rfl:     memantine (Namenda) 28 mg capsule,sprinkle,ER 24hr, Take 1 capsule (28 mg) by mouth once daily., Disp: , Rfl:     polyvinyl alcohol (Liquifilm Tears) 1.4 % ophthalmic solution, Administer 2 drops into both eyes 3 times a day as needed., Disp: , Rfl:     simvastatin (Zocor) 20 mg tablet, Take 1 tablet (20 mg) by mouth once daily., Disp: , Rfl:     traZODone (Desyrel) 100 mg tablet, Take 1 tablet (100 mg) by mouth once daily at bedtime., Disp: 30 tablet, Rfl: 11    venlafaxine (Effexor) 50 mg tablet, Take 1 tablet (50 mg) by mouth once daily in the morning. Take before meals., Disp: , Rfl:      Review of Systems   Constitutional:  Positive for appetite change. Negative for chills, fatigue and fever.   HENT:  Positive for rhinorrhea. Negative for dental problem, sinus pressure, sinus pain and trouble swallowing.         Positive for chronic sinus issues   Eyes:  Negative for pain and visual disturbance.   Respiratory:  Negative for cough, shortness of breath and wheezing.    Cardiovascular:  Negative for chest pain, palpitations and leg swelling.   Gastrointestinal:  Negative for abdominal distention, abdominal pain,  constipation, diarrhea and nausea.   Endocrine: Negative for cold intolerance and heat intolerance.   Genitourinary:  Negative for difficulty urinating.   Musculoskeletal:  Negative for gait problem, joint swelling and myalgias.   Skin:  Negative for color change, pallor, rash and wound.   Allergic/Immunologic: Negative for environmental allergies and food allergies.   Neurological:  Positive for dizziness and weakness. Negative for seizures and headaches.        See HPI   Hematological:  Negative for adenopathy. Does not bruise/bleed easily.   Psychiatric/Behavioral:  Negative for behavioral problems.         Positive for depression and dementia history   All other systems reviewed and are negative.      Objective   BP (!) 84/44   Pulse 76   Temp 36.3 °C (97.3 °F)   SpO2 99%     Physical Exam  Constitutional:       General: He is not in acute distress.     Appearance: He is ill-appearing. He is not toxic-appearing.   HENT:      Head: Normocephalic and atraumatic.      Nose: Nose normal.      Mouth/Throat:      Mouth: Mucous membranes are moist.      Pharynx: Oropharynx is clear.   Eyes:      Extraocular Movements: Extraocular movements intact.      Conjunctiva/sclera: Conjunctivae normal.      Pupils: Pupils are equal, round, and reactive to light.   Cardiovascular:      Rate and Rhythm: Normal rate and regular rhythm.      Pulses: Normal pulses.      Heart sounds: Normal heart sounds. No murmur heard.  Pulmonary:      Effort: Pulmonary effort is normal.      Breath sounds: Normal breath sounds. No wheezing.   Abdominal:      General: Abdomen is flat. Bowel sounds are normal.      Palpations: Abdomen is soft.   Musculoskeletal:         General: No swelling.      Cervical back: Neck supple.      Right lower leg: No edema.      Left lower leg: No edema.      Comments: Ambulatory with assistive device   Skin:     General: Skin is warm and dry.   Neurological:      General: No focal deficit present.      Mental  Status: He is alert.      Cranial Nerves: No cranial nerve deficit.      Motor: Weakness present.      Gait: Gait abnormal.      Comments: Positive for neuropathy bilateral hands and feet   Psychiatric:         Mood and Affect: Mood normal.         Behavior: Behavior normal.         Thought Content: Thought content normal.         Judgment: Judgment normal.         Assessment/Plan   Problem List Items Addressed This Visit             ICD-10-CM    AAA (abdominal aortic aneurysm) without rupture (CMS/[facility]) I71.40    Osteoarthritis of multiple joints M15.9    Seizure disorder (CMS/[facility]) G40.909    Hypotension - Primary I95.9    Dizziness R42    Generalized weakness R53.1        Patient agreeable to go to the ED for evaluation due to symptomatic hypotension. He has also been having some progressive weakness and dizziness; patient states that he feels like it is time he be placed at a facility due to increased needs. This provider staying with patient until he was taken by the [facility]    SERGIO Blanchard-CNP

## 2024-02-05 NOTE — ED TRIAGE NOTES
TRIAGE NOTE   I saw the patient as the Clinician in Triage and performed a brief history and physical exam, established acuity, and ordered appropriate tests to develop basic plan of care. Patient will be seen by an [facility], resident and/or physician who will independently evaluate the patient. Please see subsequent provider notes for further details and disposition.     Brief HPI: In brief, Demarco Gudino is a 83 y.o. male that presents for generalized weakness and fatigue along with dizziness.  Patient states that over the last month he has had increasing episodes of lightheadedness and dizziness described as generalized weakness and fatigue.  He states that over the last week it seems to gotten worse.  He states that he feels like he could almost faint when he moves from sitting to standing.  He notes no recent illnesses and has no focal complaints at this time.  He states that he feels mildly short of breath, but has been for the last month or so.  Denies any chest pain, vomiting, diarrhea, abdominal pain, or other symptoms at this time.  He denies any headaches, vision changes, numbness, tingling, focal weakness.    Focused Physical exam:   Gen: Elderly appearing male in no distress. Good historian and answers questions appropriately.    Skin: Intact, warm, dry, good turgor. No rashes or lesions noted.  No cyanosis, pallor, or jaundice.    Head: Normocephalic, atraumatic.    EENT: No scleral icterus. PERRLA and EOMI.     Resp: Breath sounds equal and coarse bilaterally. No adventitious sounds. No increased effort of breathing.    CV: Heart S1, S2 regular rate and rhythm. No m/r/g. Distal pulses 3+ symmetric bilaterally. No lower extremity edema.    ABD/: Soft, nontender.     MSK: ROM of all extremities. No joint effusions, clubbing, cyanosis, or edema.    Neuro/Psych: A&Ox3. No focal motor or sensory deficits. Appropriate mood and behavior.    Plan/MDM:   Patient mildly hypotensive in triage and fluids were  ordered.  No focal symptoms at this time other than some mild shortness of breath.  Afebrile and nontoxic-appearing with no recent illnesses.  Workup ordered for further evaluation into hypotension including basic labs, EKG, and chest x-ray.  Please see subsequent provider note for further details and disposition

## 2024-02-05 NOTE — ED PROVIDER NOTES
HPI   Chief Complaint   Patient presents with    Weakness, Gen     Pt presents for general weakness x1 week, hx of kidney disease. Homehealth care nurse called EMS for hypotension - pt BP 89/59 upon arrival       HPI     Patient is an 83-year-old male with a history of hypertension, hyperlipidemia and hypotension presenting for evaluation of generalized weakness and fatigue with some dizziness.  Patient states that he has had symptoms over the last month where he feels lightheaded with generalized weakness and fatigue.  He states that it is gotten worse over the past several days.  Patient states that sometimes he feels he could almost faint when he is moving from sitting to standing.  He denies any sick contacts or changes to his medications.  He also admits to feeling mildly short of breath for the past month.  Denies associated chest pain, nausea, vomiting, diarrhea, abdominal pain or other symptoms.  He denies headache vision changes numbness tingling or focal weakness.               Sylvia Coma Scale Score: 15                  Patient History   Past Medical History:   Diagnosis Date    AAA (abdominal aortic aneurysm) (CMS/[facility])     BPH (benign prostatic hyperplasia)     Colitis     Dementia (CMS/[facility])     Depression     DJD (degenerative joint disease)     HTN (hypertension)     Hyperlipidemia     OA (osteoarthritis)     Suicidal ideations      Past Surgical History:   Procedure Laterality Date    ACHILLES TENDON SURGERY Right 12/2007    repair    MR HEAD ANGIO WO IV CONTRAST  10/21/2019    MR HEAD ANGIO WO IV CONTRAST [facility]    MR HEAD ANGIO WO IV CONTRAST  07/27/2023    MR HEAD ANGIO WO IV CONTRAST [facility]    MR NECK ANGIO WO IV CONTRAST  10/21/2019    MR NECK ANGIO WO IV CONTRAST [facility]    REVERSE TOTAL SHOULDER ARTHROPLASTY Left 05/2019    Dr. Edwards    ROTATOR CUFF REPAIR Right 11/2011    Dr. Edwards    TOTAL KNEE ARTHROPLASTY Bilateral     TRANSURETHRAL  RESECTION OF PROSTATE  08/2018    GL TURP Dr. Lindsey    UMBILICAL HERNIA REPAIR       No family history on file.  Social History     Tobacco Use    Smoking status: Unknown    Smokeless tobacco: Not on file   Substance Use Topics    Alcohol use: Not on file    Drug use: Not on file       Physical Exam   ED Triage Vitals [02/05/24 1434]   Temperature Heart Rate Respirations BP   36.4 °C (97.5 °F) 88 18 89/59      Pulse Ox Temp Source Heart Rate Source Patient Position   98 % Oral Monitor Sitting      BP Location FiO2 (%)     Right arm --       Physical Exam    GENERAL: Well nourished and well developed. Answers questions appropriately.    SKIN: Clean and dry without evidence of rashes.    HEENT: Skull normocephalic, atraumatic. No scleral icterus. PERRLA, with EOMI.  Mucous membranes moist and pink in color.     RESPIRATORY: Clear to ausculation with normal effort. No adventitious sounds, intercostal retractions or shallow breathing.    CARDIOVASCULAR: Regular rate and rhythm with normal S1, S2. No S3, S4, murmurs or gallops. Capillary refill brisk, less than 3 seconds bilaterally. No unilateral lower extremity edema.    ABD/: Soft, nontender abdomen. Bowel sounds equal in all four quadrants. No tenderness, rebound, guarding or rigidity.    MSK: Spontaneously moves all 4 extremities without difficulty.  No injury or obvious deformity.      NEURO/PSYCH: A&Ox3. Cranial nerves II-XII grossly intact. No focal motor or sensory deficits. Appropriate mood and behavior.    ED Course & MDM   Diagnoses as of 02/05/24 1916   Acute kidney injury (CMS/HCC)       Medical Decision Making  Parts of this chart have been completed using voice recognition software. Please excuse any errors of transcription. Despite the medical decision making time stamp above-my medical decision making has taken place during the patient's entire visit. My thought process and reason for plan has been formulated from the time that I saw the patient  until the time of disposition and is not specific to one specific moment during their visit and furthermore my MDM encompasses this entire chart and not only this text box.      HPI: Detailed above.    Exam: A medically appropriate exam performed, outlined above, given the known history and presentation.    History obtained from: Patient    Social Determinants of Health considered during this visit: Age, coming from home    EKG interpreted by my attending physician, reviewed by myself.    Labs/Diagnostics: CBC, CMP, influenza, COVID testing, troponin, urinalysis with fixative culture, EKG, chest x-ray    Medications given during visit: Liter normal saline fluids    Differential diagnoses considered for this visit include: Hypotension, electrolyte imbalance, metabolic disturbance, viral illness    Considerations/further MDM:    Patient is an 83-year-old male presenting for evaluation of generalized weakness and fatigue.  Found to be mildly hypotensive in triage where fluids were ordered at that time.  CMP appreciated presence of an acute kidney injury with a creatinine of 2.2 and a GFR of 29.  COVID testing negative influenza testing negative. troponin elevated at 57, repeat of 53. Chest x-ray was interpreted by radiology reviewed by myself, negative for acute cardiopulmonary processes.  Admission was recommended for this patient given the presence of his acute kidney injury.  I consulted with the on-call hospitalist and spoke to Dr. Calvin, who accepted the patient under his services in good condition.    Patient was seen in conjunction with attending physician Dr. Ramon Escalera.   Patient's history, physical exam, diagnostic studies, and treatment plan were discussed thoroughly.    Procedure  Procedures     Ritika Jones PA-C  02/05/24 1916

## 2024-02-06 LAB
ANION GAP SERPL CALC-SCNC: 10 MMOL/L
BUN SERPL-MCNC: 25 MG/DL (ref 8–25)
CALCIUM SERPL-MCNC: 8.3 MG/DL (ref 8.5–10.4)
CHLORIDE SERPL-SCNC: 108 MMOL/L (ref 97–107)
CO2 SERPL-SCNC: 22 MMOL/L (ref 24–31)
CREAT SERPL-MCNC: 1.9 MG/DL (ref 0.4–1.6)
EGFRCR SERPLBLD CKD-EPI 2021: 35 ML/MIN/1.73M*2
GLUCOSE SERPL-MCNC: 120 MG/DL (ref 65–99)
HOLD SPECIMEN: NORMAL
POTASSIUM SERPL-SCNC: 3.9 MMOL/L (ref 3.4–5.1)
SODIUM SERPL-SCNC: 140 MMOL/L (ref 133–145)
TROPONIN T SERPL-MCNC: 43 NG/L

## 2024-02-06 PROCEDURE — 2500000004 HC RX 250 GENERAL PHARMACY W/ HCPCS (ALT 636 FOR OP/ED): Performed by: NURSE PRACTITIONER

## 2024-02-06 PROCEDURE — 97166 OT EVAL MOD COMPLEX 45 MIN: CPT | Mod: GO

## 2024-02-06 PROCEDURE — G0378 HOSPITAL OBSERVATION PER HR: HCPCS

## 2024-02-06 PROCEDURE — 2500000001 HC RX 250 WO HCPCS SELF ADMINISTERED DRUGS (ALT 637 FOR MEDICARE OP): Performed by: NURSE PRACTITIONER

## 2024-02-06 PROCEDURE — 97161 PT EVAL LOW COMPLEX 20 MIN: CPT | Mod: GP

## 2024-02-06 PROCEDURE — 80048 BASIC METABOLIC PNL TOTAL CA: CPT | Performed by: FAMILY MEDICINE

## 2024-02-06 PROCEDURE — 36415 COLL VENOUS BLD VENIPUNCTURE: CPT | Performed by: FAMILY MEDICINE

## 2024-02-06 PROCEDURE — 2500000001 HC RX 250 WO HCPCS SELF ADMINISTERED DRUGS (ALT 637 FOR MEDICARE OP): Performed by: FAMILY MEDICINE

## 2024-02-06 PROCEDURE — 84484 ASSAY OF TROPONIN QUANT: CPT | Performed by: FAMILY MEDICINE

## 2024-02-06 PROCEDURE — 2500000004 HC RX 250 GENERAL PHARMACY W/ HCPCS (ALT 636 FOR OP/ED): Performed by: FAMILY MEDICINE

## 2024-02-06 RX ORDER — DOCUSATE SODIUM 100 MG/1
100 CAPSULE, LIQUID FILLED ORAL 2 TIMES DAILY
Status: DISCONTINUED | OUTPATIENT
Start: 2024-02-06 | End: 2024-02-09 | Stop reason: HOSPADM

## 2024-02-06 RX ORDER — TAMSULOSIN HYDROCHLORIDE 0.4 MG/1
0.4 CAPSULE ORAL DAILY
COMMUNITY
End: 2024-03-25 | Stop reason: SDUPTHER

## 2024-02-06 RX ORDER — TAMSULOSIN HYDROCHLORIDE 0.4 MG/1
0.4 CAPSULE ORAL DAILY
Status: DISCONTINUED | OUTPATIENT
Start: 2024-02-06 | End: 2024-02-09 | Stop reason: HOSPADM

## 2024-02-06 RX ORDER — ACETAMINOPHEN 325 MG/1
650 TABLET ORAL EVERY 6 HOURS PRN
Status: DISCONTINUED | OUTPATIENT
Start: 2024-02-06 | End: 2024-02-09 | Stop reason: HOSPADM

## 2024-02-06 RX ORDER — POLYETHYLENE GLYCOL 3350 17 G/17G
17 POWDER, FOR SOLUTION ORAL DAILY
Status: DISCONTINUED | OUTPATIENT
Start: 2024-02-06 | End: 2024-02-09 | Stop reason: HOSPADM

## 2024-02-06 RX ORDER — HEPARIN SODIUM 5000 [USP'U]/ML
5000 INJECTION, SOLUTION INTRAVENOUS; SUBCUTANEOUS EVERY 12 HOURS
Status: DISCONTINUED | OUTPATIENT
Start: 2024-02-06 | End: 2024-02-09 | Stop reason: HOSPADM

## 2024-02-06 RX ORDER — BISACODYL 5 MG
5 TABLET, DELAYED RELEASE (ENTERIC COATED) ORAL DAILY PRN
Status: DISCONTINUED | OUTPATIENT
Start: 2024-02-06 | End: 2024-02-09 | Stop reason: HOSPADM

## 2024-02-06 RX ADMIN — VENLAFAXINE HYDROCHLORIDE 50 MG: 50 TABLET ORAL at 05:30

## 2024-02-06 RX ADMIN — SODIUM CHLORIDE 100 ML/HR: 900 INJECTION, SOLUTION INTRAVENOUS at 06:57

## 2024-02-06 RX ADMIN — LORATADINE 10 MG: 10 TABLET ORAL at 08:43

## 2024-02-06 RX ADMIN — GABAPENTIN 100 MG: 100 CAPSULE ORAL at 15:39

## 2024-02-06 RX ADMIN — TAMSULOSIN HYDROCHLORIDE 0.4 MG: 0.4 CAPSULE ORAL at 10:09

## 2024-02-06 RX ADMIN — MEMANTINE 2.5 MG: 5 TABLET ORAL at 21:14

## 2024-02-06 RX ADMIN — Medication 3 MG: at 21:13

## 2024-02-06 RX ADMIN — TRAZODONE HYDROCHLORIDE 100 MG: 100 TABLET ORAL at 21:14

## 2024-02-06 RX ADMIN — SODIUM CHLORIDE 100 ML/HR: 900 INJECTION, SOLUTION INTRAVENOUS at 17:33

## 2024-02-06 RX ADMIN — HEPARIN SODIUM 5000 UNITS: 5000 INJECTION, SOLUTION INTRAVENOUS; SUBCUTANEOUS at 21:14

## 2024-02-06 RX ADMIN — AZELASTINE HYDROCHLORIDE 1 SPRAY: 137 SPRAY, METERED NASAL at 21:15

## 2024-02-06 RX ADMIN — DOCUSATE SODIUM 100 MG: 100 CAPSULE, LIQUID FILLED ORAL at 21:13

## 2024-02-06 RX ADMIN — BUDESONIDE 3 MG: 3 CAPSULE, GELATIN COATED ORAL at 08:43

## 2024-02-06 RX ADMIN — GABAPENTIN 100 MG: 100 CAPSULE ORAL at 21:13

## 2024-02-06 RX ADMIN — GABAPENTIN 100 MG: 100 CAPSULE ORAL at 08:43

## 2024-02-06 RX ADMIN — MEMANTINE 2.5 MG: 5 TABLET ORAL at 08:43

## 2024-02-06 RX ADMIN — DOCUSATE SODIUM 100 MG: 100 CAPSULE, LIQUID FILLED ORAL at 09:24

## 2024-02-06 RX ADMIN — AZELASTINE HYDROCHLORIDE 1 SPRAY: 137 SPRAY, METERED NASAL at 08:43

## 2024-02-06 RX ADMIN — SIMVASTATIN 20 MG: 20 TABLET, FILM COATED ORAL at 21:14

## 2024-02-06 RX ADMIN — POLYETHYLENE GLYCOL 3350 17 G: 17 POWDER, FOR SOLUTION ORAL at 09:24

## 2024-02-06 RX ADMIN — ENOXAPARIN SODIUM 40 MG: 40 INJECTION SUBCUTANEOUS at 08:43

## 2024-02-06 SDOH — HEALTH STABILITY: MENTAL HEALTH: HOW MANY STANDARD DRINKS CONTAINING ALCOHOL DO YOU HAVE ON A TYPICAL DAY?: 1 OR 2

## 2024-02-06 SDOH — SOCIAL STABILITY: SOCIAL INSECURITY: WITHIN THE LAST YEAR, HAVE YOU BEEN HUMILIATED OR EMOTIONALLY ABUSED IN OTHER WAYS BY YOUR PARTNER OR EX-PARTNER?: NO

## 2024-02-06 SDOH — HEALTH STABILITY: MENTAL HEALTH: HOW OFTEN DO YOU HAVE A DRINK CONTAINING ALCOHOL?: 2-4 TIMES A MONTH

## 2024-02-06 SDOH — ECONOMIC STABILITY: FOOD INSECURITY: WITHIN THE PAST 12 MONTHS, THE FOOD YOU BOUGHT JUST DIDN'T LAST AND YOU DIDN'T HAVE MONEY TO GET MORE.: NEVER TRUE

## 2024-02-06 SDOH — HEALTH STABILITY: MENTAL HEALTH
STRESS IS WHEN SOMEONE FEELS TENSE, NERVOUS, ANXIOUS, OR CAN'T SLEEP AT NIGHT BECAUSE THEIR MIND IS TROUBLED. HOW STRESSED ARE YOU?: NOT AT ALL

## 2024-02-06 SDOH — SOCIAL STABILITY: SOCIAL INSECURITY: WITHIN THE LAST YEAR, HAVE YOU BEEN AFRAID OF YOUR PARTNER OR EX-PARTNER?: NO

## 2024-02-06 SDOH — SOCIAL STABILITY: SOCIAL NETWORK: ARE YOU MARRIED, WIDOWED, DIVORCED, SEPARATED, NEVER MARRIED, OR LIVING WITH A PARTNER?: MARRIED

## 2024-02-06 SDOH — HEALTH STABILITY: MENTAL HEALTH: HOW OFTEN DO YOU HAVE 6 OR MORE DRINKS ON ONE OCCASION?: NEVER

## 2024-02-06 SDOH — ECONOMIC STABILITY: HOUSING INSECURITY
IN THE LAST 12 MONTHS, WAS THERE A TIME WHEN YOU DID NOT HAVE A STEADY PLACE TO SLEEP OR SLEPT IN A SHELTER (INCLUDING NOW)?: NO

## 2024-02-06 SDOH — ECONOMIC STABILITY: FOOD INSECURITY: WITHIN THE PAST 12 MONTHS, YOU WORRIED THAT YOUR FOOD WOULD RUN OUT BEFORE YOU GOT MONEY TO BUY MORE.: NEVER TRUE

## 2024-02-06 SDOH — ECONOMIC STABILITY: INCOME INSECURITY: IN THE LAST 12 MONTHS, WAS THERE A TIME WHEN YOU WERE NOT ABLE TO PAY THE MORTGAGE OR RENT ON TIME?: NO

## 2024-02-06 SDOH — SOCIAL STABILITY: SOCIAL NETWORK: HOW OFTEN DO YOU ATTEND CHURCH OR RELIGIOUS SERVICES?: NEVER

## 2024-02-06 SDOH — HEALTH STABILITY: PHYSICAL HEALTH: ON AVERAGE, HOW MANY MINUTES DO YOU ENGAGE IN EXERCISE AT THIS LEVEL?: 0 MIN

## 2024-02-06 SDOH — ECONOMIC STABILITY: INCOME INSECURITY: IN THE PAST 12 MONTHS, HAS THE ELECTRIC, GAS, OIL, OR WATER COMPANY THREATENED TO SHUT OFF SERVICE IN YOUR HOME?: NO

## 2024-02-06 SDOH — SOCIAL STABILITY: SOCIAL NETWORK: HOW OFTEN DO YOU ATTENT MEETINGS OF THE CLUB OR ORGANIZATION YOU BELONG TO?: NEVER

## 2024-02-06 SDOH — ECONOMIC STABILITY: TRANSPORTATION INSECURITY
IN THE PAST 12 MONTHS, HAS LACK OF TRANSPORTATION KEPT YOU FROM MEETINGS, WORK, OR FROM GETTING THINGS NEEDED FOR DAILY LIVING?: NO

## 2024-02-06 SDOH — ECONOMIC STABILITY: TRANSPORTATION INSECURITY
IN THE PAST 12 MONTHS, HAS THE LACK OF TRANSPORTATION KEPT YOU FROM MEDICAL APPOINTMENTS OR FROM GETTING MEDICATIONS?: NO

## 2024-02-06 SDOH — HEALTH STABILITY: PHYSICAL HEALTH: ON AVERAGE, HOW MANY DAYS PER WEEK DO YOU ENGAGE IN MODERATE TO STRENUOUS EXERCISE (LIKE A BRISK WALK)?: 0 DAYS

## 2024-02-06 SDOH — ECONOMIC STABILITY: INCOME INSECURITY: HOW HARD IS IT FOR YOU TO PAY FOR THE VERY BASICS LIKE FOOD, HOUSING, MEDICAL CARE, AND HEATING?: NOT HARD AT ALL

## 2024-02-06 ASSESSMENT — COGNITIVE AND FUNCTIONAL STATUS - GENERAL
WALKING IN HOSPITAL ROOM: A LITTLE
MOVING FROM LYING ON BACK TO SITTING ON SIDE OF FLAT BED WITH BEDRAILS: A LITTLE
DRESSING REGULAR UPPER BODY CLOTHING: A LITTLE
DRESSING REGULAR LOWER BODY CLOTHING: A LITTLE
DAILY ACTIVITIY SCORE: 19
DAILY ACTIVITIY SCORE: 19
HELP NEEDED FOR BATHING: A LITTLE
MOVING TO AND FROM BED TO CHAIR: A LITTLE
WALKING IN HOSPITAL ROOM: A LITTLE
DRESSING REGULAR UPPER BODY CLOTHING: A LITTLE
DRESSING REGULAR LOWER BODY CLOTHING: A LITTLE
CLIMB 3 TO 5 STEPS WITH RAILING: A LITTLE
MOBILITY SCORE: 18
MOBILITY SCORE: 18
MOVING TO AND FROM BED TO CHAIR: A LITTLE
TOILETING: A LITTLE
HELP NEEDED FOR BATHING: A LITTLE
PERSONAL GROOMING: A LITTLE
TURNING FROM BACK TO SIDE WHILE IN FLAT BAD: A LITTLE
STANDING UP FROM CHAIR USING ARMS: A LITTLE
PERSONAL GROOMING: A LITTLE
TURNING FROM BACK TO SIDE WHILE IN FLAT BAD: A LITTLE
CLIMB 3 TO 5 STEPS WITH RAILING: A LITTLE
STANDING UP FROM CHAIR USING ARMS: A LITTLE
MOVING FROM LYING ON BACK TO SITTING ON SIDE OF FLAT BED WITH BEDRAILS: A LITTLE
TOILETING: A LITTLE

## 2024-02-06 ASSESSMENT — PAIN - FUNCTIONAL ASSESSMENT
PAIN_FUNCTIONAL_ASSESSMENT: 0-10

## 2024-02-06 ASSESSMENT — ENCOUNTER SYMPTOMS
BRUISES/BLEEDS EASILY: 0
CONFUSION: 0
FEVER: 0
HEMATURIA: 0
SINUS PRESSURE: 0
PALPITATIONS: 0
LIGHT-HEADEDNESS: 1
WOUND: 0
POLYDIPSIA: 0
SLEEP DISTURBANCE: 0
JOINT SWELLING: 0
BACK PAIN: 0
TREMORS: 0
RECTAL PAIN: 0
HALLUCINATIONS: 0
SHORTNESS OF BREATH: 0
HEADACHES: 0
PHOTOPHOBIA: 0
APNEA: 0
WHEEZING: 0
ADENOPATHY: 0
DIZZINESS: 1
SEIZURES: 0
FATIGUE: 1
SORE THROAT: 0
VOMITING: 0
FREQUENCY: 0
CONSTIPATION: 1
SPEECH DIFFICULTY: 0
ACTIVITY CHANGE: 1
POLYPHAGIA: 0
NAUSEA: 0
ABDOMINAL PAIN: 0
MYALGIAS: 0
BLOOD IN STOOL: 0
WEAKNESS: 1
DIARRHEA: 0
ARTHRALGIAS: 0
NUMBNESS: 0
DYSURIA: 0
NECK PAIN: 0
COUGH: 0
CHILLS: 0
COLOR CHANGE: 0

## 2024-02-06 ASSESSMENT — ACTIVITIES OF DAILY LIVING (ADL)
ADL_ASSISTANCE: INDEPENDENT
ADL_ASSISTANCE: INDEPENDENT
LACK_OF_TRANSPORTATION: NO
BATHING_ASSISTANCE: SPONGE BATHING

## 2024-02-06 ASSESSMENT — PAIN SCALES - GENERAL
PAINLEVEL_OUTOF10: 0 - NO PAIN

## 2024-02-06 ASSESSMENT — LIFESTYLE VARIABLES
SKIP TO QUESTIONS 9-10: 1
AUDIT-C TOTAL SCORE: 2

## 2024-02-06 NOTE — CARE PLAN
The patient's goals for the shift include  comfort and safety.    The clinical goals for the shift include comfort and safety, no falls, constipation management, improve urinary retention    No barriers to meeting these goals.       Problem: Fall/Injury  Goal: Not fall by end of shift  Outcome: Progressing  Goal: Be free from injury by end of the shift  Outcome: Progressing  Goal: Verbalize understanding of personal risk factors for fall in the hospital  Outcome: Progressing  Goal: Verbalize understanding of risk factor reduction measures to prevent injury from fall in the home  Outcome: Progressing  Goal: Use assistive devices by end of the shift  Outcome: Progressing  Goal: Pace activities to prevent fatigue by end of the shift  Outcome: Progressing

## 2024-02-06 NOTE — CARE PLAN
The patient's goals for the shift include  no falls, and rest    The clinical goals for the shift include  safety, no falls, and promote rest      02/05/24 at 10:10 PM - Sunni Garcia RN

## 2024-02-06 NOTE — PROGRESS NOTES
Demarco Gudino is a 83 y.o. male on day 0 of admission presenting with Acute kidney injury (CMS/HCC).      Subjective   Patient seen and examined. Resting in bed. States he has not had BM for 6 days. Not urinating well. Has some mild pressure in his abdomen. Denies any SOB. States his dizziness/lightheadedness with some improvement this morning.        Objective     Last Recorded Vitals  BP (!) 155/94 (BP Location: Left arm, Patient Position: Lying)   Pulse 74   Temp 36.7 °C (98.1 °F) (Temporal)   Resp 18   Wt 79.4 kg (175 lb)   SpO2 93%   Intake/Output last 3 Shifts:    Intake/Output Summary (Last 24 hours) at 2/6/2024 0905  Last data filed at 2/6/2024 0851  Gross per 24 hour   Intake 2920 ml   Output 475 ml   Net 2445 ml       Admission Weight  Weight: 79.4 kg (175 lb) (02/05/24 1434)    Daily Weight  02/05/24 : 79.4 kg (175 lb)    Image Results    No new imaging to review.     Physical Exam    General: Alert and oriented x3, pleasant.   Cardiac: Regular rate and rhythm, S1/S2 , no murmur.   Pulmonary: Clear to auscultation on room air.   Abdomen: Soft, mild distension, nontender. BS +x4.   Extremities: No edema.  Skin: No rashes or lesions.      Relevant Results    Scheduled medications  azelastine, 1 spray, Each Nostril, BID  budesonide EC, 3 mg, oral, Daily  docusate sodium, 100 mg, oral, BID  enoxaparin, 40 mg, subcutaneous, Daily  gabapentin, 100 mg, oral, TID  loratadine, 10 mg, oral, Daily  melatonin, 3 mg, oral, Nightly  memantine, 2.5 mg, oral, BID  polyethylene glycol, 17 g, oral, Daily  simvastatin, 20 mg, oral, Nightly  traZODone, 100 mg, oral, Nightly  venlafaxine, 50 mg, oral, Daily before breakfast      Continuous medications  sodium chloride 0.9%, 100 mL/hr, Last Rate: 100 mL/hr (02/06/24 0657)      PRN medications  PRN medications: polyvinyl alcohol     Results for orders placed or performed during the hospital encounter of 02/05/24 (from the past 24 hour(s))   Influenza A, and B PCR   Result  Value Ref Range    Flu A Result Not Detected Not Detected    Flu B Result Not Detected Not Detected   SARS-CoV-2 RT PCR   Result Value Ref Range    Coronavirus 2019, PCR Not Detected Not Detected   ECG 12 lead   Result Value Ref Range    Ventricular Rate 81 BPM    Atrial Rate 81 BPM    NM Interval 202 ms    QRS Duration 78 ms    QT Interval 376 ms    QTC Calculation(Bazett) 436 ms    P Axis 55 degrees    R Axis 52 degrees    T Axis 46 degrees    QRS Count 14 beats    Q Onset 228 ms    P Onset 127 ms    P Offset 196 ms    T Offset 416 ms    QTC Fredericia 415 ms   Comprehensive metabolic panel   Result Value Ref Range    Glucose 116 (H) 65 - 99 mg/dL    Sodium 139 133 - 145 mmol/L    Potassium 4.0 3.4 - 5.1 mmol/L    Chloride 103 97 - 107 mmol/L    Bicarbonate 23 (L) 24 - 31 mmol/L    Urea Nitrogen 25 8 - 25 mg/dL    Creatinine 2.20 (H) 0.40 - 1.60 mg/dL    eGFR 29 (L) >60 mL/min/1.73m*2    Calcium 9.2 8.5 - 10.4 mg/dL    Albumin 3.6 3.5 - 5.0 g/dL    Alkaline Phosphatase 101 35 - 125 U/L    Total Protein 6.5 5.9 - 7.9 g/dL    AST 11 5 - 40 U/L    Bilirubin, Total 0.3 0.1 - 1.2 mg/dL    ALT 7 5 - 40 U/L    Anion Gap 13 <=19 mmol/L   Serial Troponin, Initial ([facility])   Result Value Ref Range    Troponin T, High Sensitivity 57 (HH) <=14 ng/L   CBC and Auto Differential   Result Value Ref Range    WBC 6.4 4.4 - 11.3 x10*3/uL    nRBC 0.0 0.0 - 0.0 /100 WBCs    RBC 3.50 (L) 4.50 - 5.90 x10*6/uL    Hemoglobin 12.0 (L) 13.5 - 17.5 g/dL    Hematocrit 34.8 (L) 41.0 - 52.0 %    MCV 99 80 - 100 fL    MCH 34.3 (H) 26.0 - 34.0 pg    MCHC 34.5 32.0 - 36.0 g/dL    RDW 12.3 11.5 - 14.5 %    Platelets 199 150 - 450 x10*3/uL    Neutrophils % 77.1 40.0 - 80.0 %    Immature Granulocytes %, Automated 0.6 0.0 - 0.9 %    Lymphocytes % 15.2 13.0 - 44.0 %    Monocytes % 5.7 2.0 - 10.0 %    Eosinophils % 0.6 0.0 - 6.0 %    Basophils % 0.8 0.0 - 2.0 %    Neutrophils Absolute 4.96 1.60 - 5.50 x10*3/uL    Immature Granulocytes Absolute, Automated  0.04 0.00 - 0.50 x10*3/uL    Lymphocytes Absolute 0.98 0.80 - 3.00 x10*3/uL    Monocytes Absolute 0.37 0.05 - 0.80 x10*3/uL    Eosinophils Absolute 0.04 0.00 - 0.40 x10*3/uL    Basophils Absolute 0.05 0.00 - 0.10 x10*3/uL   Urinalysis with Reflex Culture and Microscopic   Result Value Ref Range    Color, Urine Light-Yellow Light-Yellow, Yellow, Dark-Yellow    Appearance, Urine Clear Clear    Specific Gravity, Urine 1.006 1.005 - 1.035    pH, Urine 5.0 5.0, 5.5, 6.0, 6.5, 7.0, 7.5, 8.0    Protein, Urine NEGATIVE NEGATIVE, 10 (TRACE), 20 (TRACE) mg/dL    Glucose, Urine Normal Normal mg/dL    Blood, Urine NEGATIVE NEGATIVE    Ketones, Urine NEGATIVE NEGATIVE mg/dL    Bilirubin, Urine NEGATIVE NEGATIVE    Urobilinogen, Urine Normal Normal mg/dL    Nitrite, Urine NEGATIVE NEGATIVE    Leukocyte Esterase, Urine NEGATIVE NEGATIVE   Extra Urine Gray Tube   Result Value Ref Range    Extra Tube Hold for add-ons.    Serial Troponin, 2 Hour ([facility])   Result Value Ref Range    Troponin T, High Sensitivity 53 (HH) <=14 ng/L   Basic Metabolic Panel   Result Value Ref Range    Glucose 120 (H) 65 - 99 mg/dL    Sodium 140 133 - 145 mmol/L    Potassium 3.9 3.4 - 5.1 mmol/L    Chloride 108 (H) 97 - 107 mmol/L    Bicarbonate 22 (L) 24 - 31 mmol/L    Urea Nitrogen 25 8 - 25 mg/dL    Creatinine 1.90 (H) 0.40 - 1.60 mg/dL    eGFR 35 (L) >60 mL/min/1.73m*2    Calcium 8.3 (L) 8.5 - 10.4 mg/dL    Anion Gap 10 <=19 mmol/L   Troponin T   Result Value Ref Range    Troponin T, High Sensitivity 43 (HH) <=14 ng/L           Assessment/Plan      Acute Renal Failure  -Likely from urinary retention secondary to constipation, hypotension.   -Renal consult.   -On IVF hydration with slight improvement in creat today, down 1.9. Will defer further IVF to renal.   -Bladder scan showed 675 ml in the bladder, place cabrera catheter.   -Bowel regimen.   -Hold renal toxic meds.     Weakness  -PT/OT evals.   -OOB with assist only.     HTN  -Holding amlodipine, lasix,  lisinopril due to renal function and low BP.   -Monitor BP closely and can slowly introduce medication pending his renal function.   -Had low BP since arrival and near syncope at home, check orthostatic VS, he has already been on IVF hydration. States dizziness feels better today.     HLD  -Continue statin.     DVT Risk  -Stop Lovenox due to renal function and changed to Heparin subcutaneous.   -SCDs.     Plan  Nephrology consult.   Bladder scan shows 675ml, will place cabrera.   Start bowel regimen.   IVF per renal.   Holding renal toxic meds.   Monitor renal function closely.   PT/OT evals.         Carmen Luther, APRN-CNP

## 2024-02-06 NOTE — H&P
History Of Present Illness  Demarco Gudino is a 83 y.o. male with history of hypertension, hyperlipidemia who is presenting with generalized weakness.  The patient has been feeling tired for the last month, unsteady on his feet, generally weak.  Denies any fever or chills.  For the past few days his weakness has gotten worse.  His home health nurse came to visit him today and found that he is blood pressure was low and was brought to the emergency room.  He reports near syncope on standing.  He is mildly short of breath.  Denies chest pain nausea vomiting or diarrhea.  He thinks he is eating and drinking fine.  Initial lab work showed an increase in serum creatinine from 1.0 to 2.2.  The patient was given IV fluids and was admitted for further management     Past Medical History  Hypertension, hyperlipidemia, AAA, depression, degenerative joint disease, osteoarthritis    Surgical History  He has a past surgical history that includes MR angio head wo IV contrast (10/21/2019); MR angio neck wo IV contrast (10/21/2019); MR angio head wo IV contrast (07/27/2023); Umbilical hernia repair; Total knee arthroplasty (Bilateral); Achilles tendon surgery (Right, 12/2007); Rotator cuff repair (Right, 11/2011); Transurethral resection of prostate (08/2018); and Reverse total shoulder arthroplasty (Left, 05/2019).     Social History  Denies any alcohol abuse or drug abuse    Family History  Noncontributory due to advanced age     Allergies  Clarithromycin, Hydromorphone, Tapentadol, and Adhesive    Review of Systems  As in history of present illness, otherwise negative    Physical Exam   A&O x3  PERRL EOMI oral mucosa dry  Lungs diminished breath sounds bilaterally   Heart RRR  Abd soft NT   Ext no leg edema   Neuro no focal deficit     Last Recorded Vitals  /85 (BP Location: Right arm, Patient Position: Sitting)   Pulse 72   Temp 36.4 °C (97.5 °F) (Oral)   Resp 16   Wt 79.4 kg (175 lb)   SpO2 96%     Relevant  Results  Results for orders placed or performed during the hospital encounter of 02/05/24 (from the past 24 hour(s))   Influenza A, and B PCR   Result Value Ref Range    Flu A Result Not Detected Not Detected    Flu B Result Not Detected Not Detected   SARS-CoV-2 RT PCR   Result Value Ref Range    Coronavirus 2019, PCR Not Detected Not Detected   Comprehensive metabolic panel   Result Value Ref Range    Glucose 116 (H) 65 - 99 mg/dL    Sodium 139 133 - 145 mmol/L    Potassium 4.0 3.4 - 5.1 mmol/L    Chloride 103 97 - 107 mmol/L    Bicarbonate 23 (L) 24 - 31 mmol/L    Urea Nitrogen 25 8 - 25 mg/dL    Creatinine 2.20 (H) 0.40 - 1.60 mg/dL    eGFR 29 (L) >60 mL/min/1.73m*2    Calcium 9.2 8.5 - 10.4 mg/dL    Albumin 3.6 3.5 - 5.0 g/dL    Alkaline Phosphatase 101 35 - 125 U/L    Total Protein 6.5 5.9 - 7.9 g/dL    AST 11 5 - 40 U/L    Bilirubin, Total 0.3 0.1 - 1.2 mg/dL    ALT 7 5 - 40 U/L    Anion Gap 13 <=19 mmol/L   Serial Troponin, Initial ([facility])   Result Value Ref Range    Troponin T, High Sensitivity 57 (HH) <=14 ng/L   CBC and Auto Differential   Result Value Ref Range    WBC 6.4 4.4 - 11.3 x10*3/uL    nRBC 0.0 0.0 - 0.0 /100 WBCs    RBC 3.50 (L) 4.50 - 5.90 x10*6/uL    Hemoglobin 12.0 (L) 13.5 - 17.5 g/dL    Hematocrit 34.8 (L) 41.0 - 52.0 %    MCV 99 80 - 100 fL    MCH 34.3 (H) 26.0 - 34.0 pg    MCHC 34.5 32.0 - 36.0 g/dL    RDW 12.3 11.5 - 14.5 %    Platelets 199 150 - 450 x10*3/uL    Neutrophils % 77.1 40.0 - 80.0 %    Immature Granulocytes %, Automated 0.6 0.0 - 0.9 %    Lymphocytes % 15.2 13.0 - 44.0 %    Monocytes % 5.7 2.0 - 10.0 %    Eosinophils % 0.6 0.0 - 6.0 %    Basophils % 0.8 0.0 - 2.0 %    Neutrophils Absolute 4.96 1.60 - 5.50 x10*3/uL    Immature Granulocytes Absolute, Automated 0.04 0.00 - 0.50 x10*3/uL    Lymphocytes Absolute 0.98 0.80 - 3.00 x10*3/uL    Monocytes Absolute 0.37 0.05 - 0.80 x10*3/uL    Eosinophils Absolute 0.04 0.00 - 0.40 x10*3/uL    Basophils Absolute 0.05 0.00 - 0.10 x10*3/uL    Urinalysis with Reflex Culture and Microscopic   Result Value Ref Range    Color, Urine Light-Yellow Light-Yellow, Yellow, Dark-Yellow    Appearance, Urine Clear Clear    Specific Gravity, Urine 1.006 1.005 - 1.035    pH, Urine 5.0 5.0, 5.5, 6.0, 6.5, 7.0, 7.5, 8.0    Protein, Urine NEGATIVE NEGATIVE, 10 (TRACE), 20 (TRACE) mg/dL    Glucose, Urine Normal Normal mg/dL    Blood, Urine NEGATIVE NEGATIVE    Ketones, Urine NEGATIVE NEGATIVE mg/dL    Bilirubin, Urine NEGATIVE NEGATIVE    Urobilinogen, Urine Normal Normal mg/dL    Nitrite, Urine NEGATIVE NEGATIVE    Leukocyte Esterase, Urine NEGATIVE NEGATIVE   Serial Troponin, 2 Hour ([facility])   Result Value Ref Range    Troponin T, High Sensitivity 53 (HH) <=14 ng/L     XR chest 2 views    Result Date: 2/5/2024  Interpreted By:  Reji Santiago, STUDY: XR CHEST 2 VIEWS; 2/5/2024 3:43 pm   INDICATION: Signs/Symptoms:dyspnea, weakness   COMPARISON: 02/05/2024   ACCESSION NUMBER(S): [id number]   ORDERING CLINICIAN: ZEN FARFAN   TECHNIQUE: PA and LAT views of the chest were obtained.   FINDINGS: The cardiomediastinal silhouette is unremarkable. The lungs are clear. No pleural effusion is identified.   The osseous structures are intact. Normal appearance of left total shoulder prosthesis.       No acute cardiopulmonary process.     Signed by: Reji Santiago 2/5/2024 4:11 PM Dictation workstation:   [id number]     Assessment/Plan   Acute kidney injury, likely due to hypotension and volume depletion  Generalized weakness due to above  Elevated troponin, likely demand ischemia-he denies chest pain  History of hyperlipidemia, hypertension, depression, AAA, degenerative joint disease    Plan:  Gentle IV hydration, hold blood pressure medications and Lasix  Monitor renal function, check serial troponins  If no improvement in renal function, will consider ordering ultrasound of the kidneys  PT OT evaluation treatment  DVT prophylaxis       Montez Calvin MD

## 2024-02-06 NOTE — PROGRESS NOTES
Physical Therapy    Physical Therapy Evaluation    Patient Name: Demarco Gudino  MRN: [id number]  Today's Date: 2/6/2024   Time Calculation  Start Time: 1008  Stop Time: 1024  Time Calculation (min): 16 min    Assessment/Plan   PT Assessment  PT Assessment Results: Decreased strength, Decreased endurance, Impaired balance, Decreased mobility, Decreased safety awareness, Impaired judgement  Rehab Prognosis: Good  Evaluation/Treatment Tolerance: Patient limited by fatigue  Medical Staff Made Aware: Yes  End of Session Communication: Bedside nurse  Assessment Comment: 84 y/o male who presents as a falls risk due to generalized weakness and impaired balance and activity tolerance. Pt appears unsafe to return home ALONE at this time, however anticipate increased mobility as medical status improves. Pt would benefit from daily assist upon dc.  End of Session Patient Position: Up in chair, Alarm on  IP OR SWING BED PT PLAN  Inpatient or Swing Bed: Inpatient  PT Plan  Treatment/Interventions: Bed mobility, Transfer training, Gait training, Stair training, Balance training, Strengthening, Endurance training, Therapeutic exercise, Therapeutic activity  PT Plan: Skilled PT  PT Frequency: 4 times per week  PT Discharge Recommendations: Low intensity level of continued care, 24 hr supervision due to cognition  PT Recommended Transfer Status: Assist x1, Assistive device  PT - OK to Discharge: Yes      Subjective   General Visit Information:  General  Reason for Referral: impaired mobility  Past Medical History Relevant to Rehab: 84 y/o male who presented with generalized weakness. ANALILIA.  PMH including HTN, HLD, AAA, DJD, OA, depression  Family/Caregiver Present: No  Prior to Session Communication: Bedside nurse  Patient Position Received: Up in chair  Preferred Learning Style: verbal  General Comment: RN at bedside upon arrival. Pt standing in room using urinal upon PT arrival. Returned self to sitting in chair. Pt agreeable to  "participate.  Home Living:  Home Living  Type of Home: House  Lives With: Alone  Home Adaptive Equipment: Walker rolling or standard, Cane  Home Layout: Multi-level  Home Access: Stairs to enter with rails  Entrance Stairs-Rails: Both  Entrance Stairs-Number of Steps: 4  Home Living Comments: Second floor bedroom and bathroom  Prior Level of Function:  Prior Function Per Pt/Caregiver Report  Level of [place]: Independent with ADLs and functional transfers  ADL Assistance: Independent  Ambulatory Assistance: Independent  Prior Function Comments: Recent need for rollator for ambulation due to weakness. Pt able to navigate stairs at baseline, although reported, \"I was so weak this week, I couldn't even do that.\"  Precautions:  Precautions  Medical Precautions: Fall precautions  Vital Signs:       Objective   Pain:  Pain Assessment  Pain Assessment: 0-10  Pain Score: 0 - No pain  Cognition:  Cognition  Overall Cognitive Status: Within Functional Limits  Attention:  (able to follow commands with cueing for safety and repetition)  Insight: Mild  Impulsive: Mildly    General Assessments:       Activity Tolerance  Endurance: Decreased tolerance for upright activites    Sensation  Light Touch: No apparent deficits    Strength  Strength Comments: B LEs > 3/5 observed  Postural Control  Posture Comment: flexed posture    Static Sitting Balance  Static Sitting-Level of Assistance: Distant supervision    Static Standing Balance  Static Standing-Level of Assistance: Close supervision  Static Standing-Comment/Number of Minutes: with and without UE support  Dynamic Standing Balance  Dynamic Standing-Comments: More steady wtih UE support of walker during ambulation  Functional Assessments:  Bed Mobility  Bed Mobility: No (pt up in chair pre/post PT)    Transfers  Transfer: Yes  Transfer 1  Technique 1: Sit to stand, Stand to sit  Transfer Level of Assistance 1: Close supervision  Trials/Comments 1: from low chair with arms. UE " support of walker once standing.    Ambulation/Gait Training  Ambulation/Gait Training Performed: Yes  Ambulation/Gait Training 1  Surface 1: Level tile  Device 1: Rolling walker  Assistance 1: Close supervision  Quality of Gait 1: Decreased step length, Forward flexed posture (decreased dima, mild SOB)  Comments/Distance (ft) 1: about 50 ft x 2 with standing rest break in between trials.  Extremity/Trunk Assessments:  RUE   RUE :  (observed decreased ability to elevate shoulders)  LUE   LUE:  (observed decreased ability to elevate shoulders)  RLE   RLE : Within Functional Limits  LLE   LLE : Within Functional Limits  Outcome Measures:  [facility] Basic Mobility  Turning from your back to your side while in a flat bed without using bedrails: A little  Moving from lying on your back to sitting on the side of a flat bed without using bedrails: A little  Moving to and from bed to chair (including a wheelchair): A little  Standing up from a chair using your arms (e.g. wheelchair or bedside chair): A little  To walk in hospital room: A little  Climbing 3-5 steps with railing: A little  Basic Mobility - Total Score: 18    Encounter Problems       Encounter Problems (Active)       Balance       dynamic (Progressing)       Start:  02/06/24    Expected End:  02/20/24       Pt will perform Tinetti assessment and score >/= 24/28, resulting in a low falls risk             Mobility       LTG - Patient will navigate 4-6 steps with rails/device (Progressing)       Start:  02/06/24    Expected End:  02/20/24            ambulation (Progressing)       Start:  02/06/24    Expected End:  02/20/24       Pt will amb >200 ft with wheeled walker and mod independence          endurance (Progressing)       Start:  02/06/24    Expected End:  02/20/24       Pt will tolerate 6MWT without SOB and with ANI rating 12-16            Safety       LTG - Patient will utilize safety techniques (Progressing)       Start:  02/06/24    Expected End:   02/20/24               Transfers       sit to stand (Progressing)       Start:  02/06/24    Expected End:  02/20/24       Pt will perform sit to stand transfer with LRAD and mod independence.                 Education Documentation  Precautions, taught by Dara Lopez PT at 2/6/2024 10:49 AM.  Learner: Patient  Readiness: Acceptance  Method: Explanation  Response: Needs Reinforcement    Mobility Training, taught by Dara Lopez PT at 2/6/2024 10:49 AM.  Learner: Patient  Readiness: Acceptance  Method: Explanation  Response: Needs Reinforcement    Education Comments  No comments found.

## 2024-02-06 NOTE — CARE PLAN
Problem: Balance  Goal: dynamic  Description: Pt will perform Tinetti assessment and score >/= 24/28, resulting in a low falls risk   Outcome: Progressing     Problem: Mobility  Goal: LTG - Patient will navigate 4-6 steps with rails/device  Outcome: Progressing  Goal: ambulation  Description: Pt will amb >200 ft with wheeled walker and mod independence   Outcome: Progressing  Goal: endurance  Description: Pt will tolerate 6MWT without SOB and with ANI rating 12-16  Outcome: Progressing     Problem: Safety  Goal: LTG - Patient will utilize safety techniques  Outcome: Progressing     Problem: Transfers  Goal: sit to stand  Description: Pt will perform sit to stand transfer with LRAD and mod independence.   Outcome: Progressing

## 2024-02-06 NOTE — PROGRESS NOTES
"Demarco Gudino is a 83 y.o. male with history of hypertension, hyperlipidemia who is presenting with generalized weakness.  The patient has been feeling tired for the last month, unsteady on his feet, generally weak.     Patient seen at bedside, introduced myself and reason for visit. Patient is retired, currently lives alone, is  but wife resides at [facility]. Currently lives in a multi-level home  with 4 steps to enter with rails. Patient has a walker, cane.  Second floor bedroom and bathroom with 14 steps.  Independent with ADLs and functional transfers, Ambulatory Assistance: Independent. Recent need for rollator for ambulation due to weakness. Pt able to navigate stairs at baseline, although reported, \"I was so weak this week, I couldn't even do that.\" He is able to some household chores and says he does the best he can. He showers weekly but recently having more difficulty getting into the tub (with rails). High risk for falls,  Patient is active with [facility] and followed by Alyssa Valadez NP. Alyssa saw him yesterday and sent him to the ED. Patient stated to her that he feels like it is time he be placed at a facility due to increased needs. Patient continues to reside in a single family home by himself as his wife remains in LTC. Patient does not drive but his neighbors continue to assist more with groceries and other needed errands. His nephew is his only other relative and is no longer very involved with his care, due to being 3 hrs away. Medications are delivered through the mail ([facility]). Patient remains independent with ADLs; he states that his nephew manages his finances and pays all of his bills. Patient does the majroity of his own cooking in addition to recieving Meals on Wheels. He is also working with a  through [employer].  He reports that he quit smoking about 13 months ago. His smoking use included cigarettes. He does not have any smokeless tobacco history " on file. He reports current alcohol use of about 1.0 standard drink of alcohol per week. He reports that he does not use drugs. Patient states that he typically stays at home and watches TV. He does very little.   Patient is open to home care/therapy. He was thinking about nursing home options and is not ready yet, but would like to try HHC/PT.   PT Discharge Recommendations: Low intensity level of continued care, 24 hr supervision due to cognition.   Also made a few recommendations as follows:  Patient does not have a life alert, does not always carry a phone with him in case he falls. Patient is open to life alert, would need help ordering one.   Since he has meals on wheels, recommended hiring housecleaning the  of Aging. Will provide information.   Other resources: [employer]; [facility].     PLAN: Discharge to home with home health care/PT. Need to make a referral.     Lesa Marie RN

## 2024-02-06 NOTE — CONSULTS
.Reason For Consult   acute kidney injury    History Of Present Illness  Demarco Gudino is a 83 y.o. male  who is known to have a history of hypertension who presented to the emergency room yesterday because of generalized weakness, severe dizziness, severe constipation and also not able to urinate upon evaluation in the emergency room he was found to be extremely hypotensive blood pressure was down in the 80s he was resuscitated with IV fluids admitted to the regular floor for further evaluation and treatment I was called to see the patient in consultation because of acute renal failure with elevated serum creatinine level patient told me that he had enlarged prostate and he takes Flomax he had not received that yesterday and he thinks that is the reason why he is not able to void he denied however any hematuria or dysuria.  Denies any GI symptoms no vomiting or nausea however he is constipated     Review of Systems  Review of Systems   Constitutional:  Positive for activity change and fatigue. Negative for chills and fever.   HENT:  Negative for sinus pressure, sore throat and tinnitus.    Eyes:  Negative for photophobia and visual disturbance.   Respiratory:  Negative for apnea, cough, shortness of breath and wheezing.    Cardiovascular:  Negative for chest pain, palpitations and leg swelling.   Gastrointestinal:  Positive for constipation. Negative for abdominal pain, blood in stool, diarrhea, nausea, rectal pain and vomiting.   Endocrine: Negative for cold intolerance, heat intolerance, polydipsia, polyphagia and polyuria.   Genitourinary:  Positive for decreased urine volume. Negative for dysuria, frequency, hematuria and urgency.   Musculoskeletal:  Negative for arthralgias, back pain, joint swelling, myalgias and neck pain.   Skin:  Negative for color change, pallor, rash and wound.   Neurological:  Positive for dizziness, weakness and light-headedness. Negative for tremors, seizures, syncope, speech  difficulty, numbness and headaches.   Hematological:  Negative for adenopathy. Does not bruise/bleed easily.   Psychiatric/Behavioral:  Negative for confusion, hallucinations, sleep disturbance and suicidal ideas.         Past Medical History  He has a past medical history of AAA (abdominal aortic aneurysm) (CMS/[facility]), BPH (benign prostatic hyperplasia), Colitis, Dementia (CMS/[facility]), Depression, DJD (degenerative joint disease), HTN (hypertension), Hyperlipidemia, OA (osteoarthritis), and Suicidal ideations.    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     Social History  He reports that he quit smoking about 13 months ago. His smoking use included cigarettes. He does not have any smokeless tobacco history on file. He reports current alcohol use of about 1.0 standard drink of alcohol per week. He reports that he does not use drugs.    Family History  No family history on file.     Current Facility-Administered Medications:     acetaminophen (Tylenol) tablet 650 mg, 650 mg, oral, q6h PRN, Carmen Luther, APRN-CNP    azelastine (Astelin) 137 mcg (0.1 %) nasal spray 1 spray, 1 spray, Each Nostril, BID, Montez Calvin MD, 1 spray at 02/06/24 0843    bisacodyl (Dulcolax) EC tablet 5 mg, 5 mg, oral, Daily PRN, Carmen Luther, APRN-CNP    budesonide EC (Entocort EC) 24 hr capsule 3 mg, 3 mg, oral, Daily, Montez Calvin MD, 3 mg at 02/06/24 0843    docusate sodium (Colace) capsule 100 mg, 100 mg, oral, BID, Carmen Luther, APRN-CNP, 100 mg at 02/06/24 0924    gabapentin (Neurontin) capsule 100 mg, 100 mg, oral, TID, Montez Calvin MD,  100 mg at 02/06/24 0843    heparin (porcine) injection 5,000 Units, 5,000 Units, subcutaneous, q12h, BHAVANI Da Silva    loratadine (Claritin) tablet 10 mg, 10 mg, oral, Daily, Montez Calvin MD, 10 mg at 02/06/24 0843    melatonin tablet 3 mg, 3 mg, oral, Nightly, Montez Calvin MD, 3 mg at 02/05/24 2237    memantine (Namenda) tablet 2.5 mg, 2.5 mg, oral, BID, Montez Calvin MD, 2.5 mg at 02/06/24 0843    polyethylene glycol (Glycolax, Miralax) packet 17 g, 17 g, oral, Daily, BHAVANI Da Silva, 17 g at 02/06/24 0924    polyvinyl alcohol (Liquifilm Tears) 1.4 % ophthalmic solution 2 drop, 2 drop, Both Eyes, TID PRN, Montez Calvin MD    simvastatin (Zocor) tablet 20 mg, 20 mg, oral, Nightly, Montez Calvin MD, 20 mg at 02/05/24 2237    sodium chloride 0.9% infusion, 100 mL/hr, intravenous, Continuous, Montez Calvin MD, Last Rate: 100 mL/hr at 02/06/24 0657, 100 mL/hr at 02/06/24 0657    tamsulosin (Flomax) 24 hr capsule 0.4 mg, 0.4 mg, oral, Daily, BHAVANI Da Silva, 0.4 mg at 02/06/24 1009    traZODone (Desyrel) tablet 100 mg, 100 mg, oral, Nightly, Montez Calvin MD, 100 mg at 02/05/24 2237    venlafaxine (Effexor) tablet 50 mg, 50 mg, oral, Daily before breakfast, Montez Calvin MD, 50 mg at 02/06/24 0530   Allergies  Clarithromycin, Hydromorphone, Tapentadol, and Adhesive         Physical Exam  Physical Exam  Constitutional:       General: He is not in acute distress.     Appearance: He is not toxic-appearing.   HENT:      Head: Normocephalic and atraumatic.   Eyes:      Extraocular Movements: Extraocular movements intact.      Pupils: Pupils are equal, round, and reactive to light.   Neck:      Vascular: No carotid bruit.   Cardiovascular:      Rate and Rhythm: Normal rate and regular rhythm.   Pulmonary:      Effort: No respiratory distress.      Breath sounds: No stridor. No wheezing, rhonchi or rales.   Chest:      Chest wall: No tenderness.   Abdominal:      General: There is distension.       "Palpations: There is no mass.      Tenderness: There is no abdominal tenderness. There is no right CVA tenderness, left CVA tenderness or guarding.      Hernia: No hernia is present.      Comments:  distention over the bladder area however it is not tender   Musculoskeletal:         General: No swelling or tenderness.      Cervical back: No rigidity.      Right lower leg: No edema.      Left lower leg: No edema.   Lymphadenopathy:      Cervical: No cervical adenopathy.   Skin:     General: Skin is warm and dry.      Coloration: Skin is not jaundiced or pale.      Findings: No bruising or erythema.   Neurological:      General: No focal deficit present.      Mental Status: He is alert and oriented to person, place, and time.   Psychiatric:         Mood and Affect: Mood normal.         Behavior: Behavior normal.              I&O 24HR    Intake/Output Summary (Last 24 hours) at 2/6/2024 1115  Last data filed at 2/6/2024 1045  Gross per 24 hour   Intake 2920 ml   Output 1275 ml   Net 1645 ml       Vitals 24HR  Heart Rate:  [62-88]   Temp:  [36.3 °C (97.3 °F)-36.7 °C (98.1 °F)]   Resp:  [16-18]   BP: ()/(44-94)   Height:  [188 cm (6' 2\")]   Weight:  [79.4 kg (175 lb)]   SpO2:  [93 %-99 %]     Relevant Results        Results for orders placed or performed during the hospital encounter of 02/05/24 (from the past 96 hour(s))   Influenza A, and B PCR   Result Value Ref Range    Flu A Result Not Detected Not Detected    Flu B Result Not Detected Not Detected   SARS-CoV-2 RT PCR   Result Value Ref Range    Coronavirus 2019, PCR Not Detected Not Detected   ECG 12 lead   Result Value Ref Range    Ventricular Rate 81 BPM    Atrial Rate 81 BPM    ND Interval 202 ms    QRS Duration 78 ms    QT Interval 376 ms    QTC Calculation(Bazett) 436 ms    P Axis 55 degrees    R Axis 52 degrees    T Axis 46 degrees    QRS Count 14 beats    Q Onset 228 ms    P Onset 127 ms    P Offset 196 ms    T Offset 416 ms    QTC Fredericia 415 ms "   Comprehensive metabolic panel   Result Value Ref Range    Glucose 116 (H) 65 - 99 mg/dL    Sodium 139 133 - 145 mmol/L    Potassium 4.0 3.4 - 5.1 mmol/L    Chloride 103 97 - 107 mmol/L    Bicarbonate 23 (L) 24 - 31 mmol/L    Urea Nitrogen 25 8 - 25 mg/dL    Creatinine 2.20 (H) 0.40 - 1.60 mg/dL    eGFR 29 (L) >60 mL/min/1.73m*2    Calcium 9.2 8.5 - 10.4 mg/dL    Albumin 3.6 3.5 - 5.0 g/dL    Alkaline Phosphatase 101 35 - 125 U/L    Total Protein 6.5 5.9 - 7.9 g/dL    AST 11 5 - 40 U/L    Bilirubin, Total 0.3 0.1 - 1.2 mg/dL    ALT 7 5 - 40 U/L    Anion Gap 13 <=19 mmol/L   Serial Troponin, Initial ([facility])   Result Value Ref Range    Troponin T, High Sensitivity 57 (HH) <=14 ng/L   CBC and Auto Differential   Result Value Ref Range    WBC 6.4 4.4 - 11.3 x10*3/uL    nRBC 0.0 0.0 - 0.0 /100 WBCs    RBC 3.50 (L) 4.50 - 5.90 x10*6/uL    Hemoglobin 12.0 (L) 13.5 - 17.5 g/dL    Hematocrit 34.8 (L) 41.0 - 52.0 %    MCV 99 80 - 100 fL    MCH 34.3 (H) 26.0 - 34.0 pg    MCHC 34.5 32.0 - 36.0 g/dL    RDW 12.3 11.5 - 14.5 %    Platelets 199 150 - 450 x10*3/uL    Neutrophils % 77.1 40.0 - 80.0 %    Immature Granulocytes %, Automated 0.6 0.0 - 0.9 %    Lymphocytes % 15.2 13.0 - 44.0 %    Monocytes % 5.7 2.0 - 10.0 %    Eosinophils % 0.6 0.0 - 6.0 %    Basophils % 0.8 0.0 - 2.0 %    Neutrophils Absolute 4.96 1.60 - 5.50 x10*3/uL    Immature Granulocytes Absolute, Automated 0.04 0.00 - 0.50 x10*3/uL    Lymphocytes Absolute 0.98 0.80 - 3.00 x10*3/uL    Monocytes Absolute 0.37 0.05 - 0.80 x10*3/uL    Eosinophils Absolute 0.04 0.00 - 0.40 x10*3/uL    Basophils Absolute 0.05 0.00 - 0.10 x10*3/uL   Urinalysis with Reflex Culture and Microscopic   Result Value Ref Range    Color, Urine Light-Yellow Light-Yellow, Yellow, Dark-Yellow    Appearance, Urine Clear Clear    Specific Gravity, Urine 1.006 1.005 - 1.035    pH, Urine 5.0 5.0, 5.5, 6.0, 6.5, 7.0, 7.5, 8.0    Protein, Urine NEGATIVE NEGATIVE, 10 (TRACE), 20 (TRACE) mg/dL    Glucose,  Urine Normal Normal mg/dL    Blood, Urine NEGATIVE NEGATIVE    Ketones, Urine NEGATIVE NEGATIVE mg/dL    Bilirubin, Urine NEGATIVE NEGATIVE    Urobilinogen, Urine Normal Normal mg/dL    Nitrite, Urine NEGATIVE NEGATIVE    Leukocyte Esterase, Urine NEGATIVE NEGATIVE   Extra Urine Gray Tube   Result Value Ref Range    Extra Tube Hold for add-ons.    Serial Troponin, 2 Hour ([facility])   Result Value Ref Range    Troponin T, High Sensitivity 53 (HH) <=14 ng/L   Basic Metabolic Panel   Result Value Ref Range    Glucose 120 (H) 65 - 99 mg/dL    Sodium 140 133 - 145 mmol/L    Potassium 3.9 3.4 - 5.1 mmol/L    Chloride 108 (H) 97 - 107 mmol/L    Bicarbonate 22 (L) 24 - 31 mmol/L    Urea Nitrogen 25 8 - 25 mg/dL    Creatinine 1.90 (H) 0.40 - 1.60 mg/dL    eGFR 35 (L) >60 mL/min/1.73m*2    Calcium 8.3 (L) 8.5 - 10.4 mg/dL    Anion Gap 10 <=19 mmol/L   Troponin T   Result Value Ref Range    Troponin T, High Sensitivity 43 (HH) <=14 ng/L          Assessment/Plan     ECG 12 lead    Result Date: 2/6/2024  Normal sinus rhythm Normal ECG When compared with ECG of 12-FEB-2021 11:27, No significant change was found    XR chest 2 views    Result Date: 2/5/2024  Interpreted By:  Reji Santiago, STUDY: XR CHEST 2 VIEWS; 2/5/2024 3:43 pm   INDICATION: Signs/Symptoms:dyspnea, weakness   COMPARISON: 02/05/2024   ACCESSION NUMBER(S): [id number]   ORDERING CLINICIAN: ZEN FARFAN   TECHNIQUE: PA and LAT views of the chest were obtained.   FINDINGS: The cardiomediastinal silhouette is unremarkable. The lungs are clear. No pleural effusion is identified.   The osseous structures are intact. Normal appearance of left total shoulder prosthesis.       No acute cardiopulmonary process.     Signed by: Reji Santiago 2/5/2024 4:11 PM Dictation workstation:   [id number]     Impression:   acute kidney injury secondary to multiple factors including dehydration severe hypotension the use of diuretics as well as NSAIDs and angiotensin converting  enzyme inhibitor lisinopril however rule out obstruction from enlarged prostate severe   severe hypotension secondary dehydration multiple antihypertensive medications   large prostate   history of hypertension   Hyperlipidemia       impression:   discontinue diuretics   avoid NSAIDs   discontinue lisinopril   ultrasound of the kidneys   bladder scan to be done by nursing staff   IV hydration   no indication for dialysis therapy we will continue to monitor renal function very closely          Principal Problem:    Acute kidney injury (CMS/HCC)        I spent 45 minutes in the professional and overall care of this patient.      Josh Tiwari MDInpatient consult to Nephrology  Consult performed by: Josh Tiwari MD  Consult ordered by: SERGIO Da Silva-CNP

## 2024-02-06 NOTE — PROGRESS NOTES
Occupational Therapy    Evaluation    Patient Name: Demarco Gudino  MRN: [id number]  Today's Date: 2/6/2024  Time Calculation  Start Time: 0919  Stop Time: 0949  Time Calculation (min): 30 min    Assessment  IP OT Assessment  OT Assessment: Pt is 82 y/o male admitted for ANALILIA. Pt presents w/ decreased ADL skills/ functional mobility, decreased activity tolerance. REcommend OT services to address above deficits.  Prognosis: Good  Barriers to Discharge: None  End of Session Communication: Bedside nurse  End of Session Patient Position: Up in chair, Alarm on  Plan:  Treatment Interventions: ADL retraining, Functional transfer training, Endurance training, Patient/family training  OT Frequency: 3 times per week  OT Discharge Recommendations: Low intensity level of continued care, 24 hr supervision due to cognition  Equipment Recommended upon Discharge: Wheeled walker  OT - OK to Discharge: Yes    Subjective   Current Problem:  1. Acute kidney injury (CMS/HCC)          General:  General  Reason for Referral: decreased ADL's  Referred By: Dr. Herrmann  Past Medical History Relevant to Rehab: HTN, HLD, AAA, DJD, OA, depression  Family/Caregiver Present: No  Prior to Session Communication: Bedside nurse  Patient Position Received: Bed, 3 rail up  Preferred Learning Style: verbal  General Comment: Cleared to see, pt found supine in bed on arrival, agreeable to therapy  Precautions:  Hearing/Visual Limitations: glasses  Medical Precautions: Fall precautions  Vital Signs:     Pain:  Pain Assessment  Pain Assessment: 0-10  Pain Score: 0 - No pain    Objective   Cognition:  Overall Cognitive Status: Within Functional Limits           Home Living:  Type of Home: House  Lives With: Alone  Home Adaptive Equipment: Walker rolling or standard (rollator)  Home Layout: Multi-level, Bed/bath upstairs  Home Access: Stairs to enter with rails  Entrance Stairs-Rails: Both  Entrance Stairs-Number of Steps: 4  Bathroom Shower/Tub: Tub/shower  unit  Bathroom Toilet: Handicapped height  Bathroom Equipment: Grab bars in shower, Shower chair with back   Prior Function:  Level of Foard: Independent with ADLs and functional transfers, Independent with homemaking with ambulation  Receives Help From: Neighbor (does shopping for pt, pt does not drive)  ADL Assistance: Independent  Homemaking Assistance: Independent  Ambulatory Assistance: Independent (has recently started using rollator)  Vocational: Retired  Hand Dominance: Right  IADL History:     ADL:  Eating Assistance: Stand by  Eating Deficit: Setup (per clinical judgement)  Grooming Assistance: Stand by  Grooming Deficit: Setup, Wash/dry face, Wash/dry hands, Teeth care, Brushing hair (min. assist to brush back pf head)  Bathing Assistance: Sponge bathing  Bathing Deficit: Setup, Right arm, Left arm, Abdomen, Chest  UE Dressing Assistance: Minimal  UE Dressing Deficit: Pull around back, Thread LUE (for gown)  LE Dressing Assistance: Minimal  LE Dressing Deficit: Steadying, Pull up over hips, Don/doff L sock, Don/doff R sock (per clinical judgement)  Toileting Assistance with Device: Not performed  Functional Assistance: Not performed  Activity Tolerance:  Endurance: Decreased tolerance for upright activites  Bed Mobility/Transfers: Bed Mobility  Bed Mobility: Yes  Bed Mobility 1  Bed Mobility 1: Supine to sitting  Level of Assistance 1: Close supervision    Transfers  Transfer: Yes  Transfer 1  Transfer From 1: Bed to  Transfer to 1: Stand  Technique 1: Sit to stand  Transfer Device 1:  (no AD)  Transfer Level of Assistance 1: Close supervision, Hand held assistance  Trials/Comments 1: Pt ambulated from bed to chair w/ min. assist and HHA. Pt is unsteady on his feet.  Transfers 2  Transfer From 2: Stand to  Transfer to 2: Sit, Chair with arms  Technique 2: Stand to sit  Transfer Device 2:  (no AD)  Transfer Level of Assistance 2: Minimum assistance  Modalities:     IADL's:      Vision: Vision -  Basic Assessment  Current Vision: Wears glasses all the time  Sensation:  Light Touch: No apparent deficits  Strength:  Strength Comments: 3+/5 shlds, 4/5 rest  Perception:  Inattention/Neglect: Appears intact  Coordination:  Movements are Fluid and Coordinated: Yes   Hand Function:  Hand Function  Gross Grasp: Functional  Coordination: Functional  Extremities: RUE   RUE : Exceptions to WFL (impaired shld, WFL rest) and LUE   LUE: Exceptions to WFL (impaired shld, WFL rest)      Outcome Measures: [facility] Daily Activity  Putting on and taking off regular lower body clothing: A little  Bathing (including washing, rinsing, drying): A little  Putting on and taking off regular upper body clothing: A little  Toileting, which includes using toilet, bedpan or urinal: A little  Taking care of personal grooming such as brushing teeth: A little  Eating Meals: None  Daily Activity - Total Score: 19      Education Documentation  ADL Training, taught by Anitha Graves OT at 2/6/2024 11:02 AM.  Learner: Patient  Readiness: Acceptance  Method: Explanation  Response: Needs Reinforcement    Education Comments  No comments found.      Goals:   Encounter Problems       Encounter Problems (Active)           OT Goals       ADL's (Progressing)       Start:  02/06/24    Expected End:  02/20/24       Pt will complete ADL tasks w/ close supervision w/ AE/ compensatory tech for safety and increased independence in self care tasks.         Functional transfers (Progressing)       Start:  02/06/24    Expected End:  02/20/24       Pt will demon. Bed, chair and toilet transfers w/ mod I w/ LRD for safety and increased independence in functional transfers.         Functional endurance  (Progressing)       Start:  02/06/24    Expected End:  02/20/24       Pt will tolerate 15 minutes of functional activity to improve endurance for self care tasks and functional mobility.

## 2024-02-07 LAB
ANION GAP SERPL CALC-SCNC: 9 MMOL/L
BUN SERPL-MCNC: 18 MG/DL (ref 8–25)
CALCIUM SERPL-MCNC: 8.4 MG/DL (ref 8.5–10.4)
CHLORIDE SERPL-SCNC: 111 MMOL/L (ref 97–107)
CO2 SERPL-SCNC: 21 MMOL/L (ref 24–31)
CREAT SERPL-MCNC: 1.5 MG/DL (ref 0.4–1.6)
EGFRCR SERPLBLD CKD-EPI 2021: 46 ML/MIN/1.73M*2
ERYTHROCYTE [DISTWIDTH] IN BLOOD BY AUTOMATED COUNT: 12.2 % (ref 11.5–14.5)
GLUCOSE SERPL-MCNC: 90 MG/DL (ref 65–99)
HCT VFR BLD AUTO: 29.4 % (ref 41–52)
HGB BLD-MCNC: 10 G/DL (ref 13.5–17.5)
MCH RBC QN AUTO: 34 PG (ref 26–34)
MCHC RBC AUTO-ENTMCNC: 34 G/DL (ref 32–36)
MCV RBC AUTO: 100 FL (ref 80–100)
NRBC BLD-RTO: 0 /100 WBCS (ref 0–0)
PLATELET # BLD AUTO: 158 X10*3/UL (ref 150–450)
POTASSIUM SERPL-SCNC: 4.2 MMOL/L (ref 3.4–5.1)
RBC # BLD AUTO: 2.94 X10*6/UL (ref 4.5–5.9)
SODIUM SERPL-SCNC: 141 MMOL/L (ref 133–145)
WBC # BLD AUTO: 4.5 X10*3/UL (ref 4.4–11.3)

## 2024-02-07 PROCEDURE — 2500000001 HC RX 250 WO HCPCS SELF ADMINISTERED DRUGS (ALT 637 FOR MEDICARE OP): Performed by: NURSE PRACTITIONER

## 2024-02-07 PROCEDURE — 36415 COLL VENOUS BLD VENIPUNCTURE: CPT | Performed by: NURSE PRACTITIONER

## 2024-02-07 PROCEDURE — 1100000001 HC PRIVATE ROOM DAILY

## 2024-02-07 PROCEDURE — 2500000004 HC RX 250 GENERAL PHARMACY W/ HCPCS (ALT 636 FOR OP/ED): Performed by: NURSE PRACTITIONER

## 2024-02-07 PROCEDURE — 2500000001 HC RX 250 WO HCPCS SELF ADMINISTERED DRUGS (ALT 637 FOR MEDICARE OP): Performed by: FAMILY MEDICINE

## 2024-02-07 PROCEDURE — 2500000004 HC RX 250 GENERAL PHARMACY W/ HCPCS (ALT 636 FOR OP/ED): Performed by: FAMILY MEDICINE

## 2024-02-07 PROCEDURE — 80048 BASIC METABOLIC PNL TOTAL CA: CPT | Performed by: NURSE PRACTITIONER

## 2024-02-07 PROCEDURE — 85027 COMPLETE CBC AUTOMATED: CPT | Performed by: NURSE PRACTITIONER

## 2024-02-07 PROCEDURE — 2500000001 HC RX 250 WO HCPCS SELF ADMINISTERED DRUGS (ALT 637 FOR MEDICARE OP): Performed by: EMERGENCY MEDICINE

## 2024-02-07 RX ORDER — GUAIFENESIN 600 MG/1
600 TABLET, EXTENDED RELEASE ORAL 2 TIMES DAILY PRN
Status: DISCONTINUED | OUTPATIENT
Start: 2024-02-07 | End: 2024-02-09 | Stop reason: HOSPADM

## 2024-02-07 RX ORDER — NYSTATIN 100000 [USP'U]/G
1 POWDER TOPICAL 2 TIMES DAILY
Status: DISCONTINUED | OUTPATIENT
Start: 2024-02-07 | End: 2024-02-09 | Stop reason: HOSPADM

## 2024-02-07 RX ADMIN — AZELASTINE HYDROCHLORIDE 1 SPRAY: 137 SPRAY, METERED NASAL at 09:05

## 2024-02-07 RX ADMIN — NYSTATIN 1 APPLICATION: 100000 POWDER TOPICAL at 09:05

## 2024-02-07 RX ADMIN — VENLAFAXINE HYDROCHLORIDE 50 MG: 50 TABLET ORAL at 05:21

## 2024-02-07 RX ADMIN — BUDESONIDE 3 MG: 3 CAPSULE, GELATIN COATED ORAL at 08:57

## 2024-02-07 RX ADMIN — SODIUM CHLORIDE 100 ML/HR: 900 INJECTION, SOLUTION INTRAVENOUS at 03:21

## 2024-02-07 RX ADMIN — NYSTATIN 1 APPLICATION: 100000 POWDER TOPICAL at 05:21

## 2024-02-07 RX ADMIN — DOCUSATE SODIUM 100 MG: 100 CAPSULE, LIQUID FILLED ORAL at 08:58

## 2024-02-07 RX ADMIN — AZELASTINE HYDROCHLORIDE 1 SPRAY: 137 SPRAY, METERED NASAL at 21:32

## 2024-02-07 RX ADMIN — HEPARIN SODIUM 5000 UNITS: 5000 INJECTION, SOLUTION INTRAVENOUS; SUBCUTANEOUS at 09:00

## 2024-02-07 RX ADMIN — GABAPENTIN 100 MG: 100 CAPSULE ORAL at 08:58

## 2024-02-07 RX ADMIN — GUAIFENESIN 600 MG: 600 TABLET ORAL at 10:48

## 2024-02-07 RX ADMIN — TRAZODONE HYDROCHLORIDE 100 MG: 100 TABLET ORAL at 21:33

## 2024-02-07 RX ADMIN — Medication 3 MG: at 21:33

## 2024-02-07 RX ADMIN — POLYETHYLENE GLYCOL 3350 17 G: 17 POWDER, FOR SOLUTION ORAL at 08:58

## 2024-02-07 RX ADMIN — ACETAMINOPHEN 650 MG: 325 TABLET ORAL at 21:37

## 2024-02-07 RX ADMIN — MEMANTINE 2.5 MG: 5 TABLET ORAL at 08:57

## 2024-02-07 RX ADMIN — HEPARIN SODIUM 5000 UNITS: 5000 INJECTION, SOLUTION INTRAVENOUS; SUBCUTANEOUS at 21:33

## 2024-02-07 RX ADMIN — TAMSULOSIN HYDROCHLORIDE 0.4 MG: 0.4 CAPSULE ORAL at 08:58

## 2024-02-07 RX ADMIN — BISACODYL 5 MG: 5 TABLET, COATED ORAL at 10:44

## 2024-02-07 RX ADMIN — LORATADINE 10 MG: 10 TABLET ORAL at 08:58

## 2024-02-07 RX ADMIN — MEMANTINE 2.5 MG: 5 TABLET ORAL at 21:33

## 2024-02-07 RX ADMIN — SIMVASTATIN 20 MG: 20 TABLET, FILM COATED ORAL at 21:37

## 2024-02-07 RX ADMIN — GABAPENTIN 100 MG: 100 CAPSULE ORAL at 21:33

## 2024-02-07 RX ADMIN — GABAPENTIN 100 MG: 100 CAPSULE ORAL at 16:54

## 2024-02-07 RX ADMIN — NYSTATIN 1 APPLICATION: 100000 POWDER TOPICAL at 21:33

## 2024-02-07 ASSESSMENT — COGNITIVE AND FUNCTIONAL STATUS - GENERAL
DRESSING REGULAR LOWER BODY CLOTHING: A LITTLE
DAILY ACTIVITIY SCORE: 19
CLIMB 3 TO 5 STEPS WITH RAILING: A LOT
WALKING IN HOSPITAL ROOM: A LITTLE
STANDING UP FROM CHAIR USING ARMS: A LITTLE
TURNING FROM BACK TO SIDE WHILE IN FLAT BAD: A LITTLE
MOVING TO AND FROM BED TO CHAIR: A LITTLE
HELP NEEDED FOR BATHING: A LITTLE
HELP NEEDED FOR BATHING: A LITTLE
MOVING FROM LYING ON BACK TO SITTING ON SIDE OF FLAT BED WITH BEDRAILS: A LITTLE
PERSONAL GROOMING: A LITTLE
DRESSING REGULAR UPPER BODY CLOTHING: A LITTLE
MOBILITY SCORE: 18
TOILETING: A LITTLE
DAILY ACTIVITIY SCORE: 19
WALKING IN HOSPITAL ROOM: A LITTLE
PERSONAL GROOMING: A LITTLE
TURNING FROM BACK TO SIDE WHILE IN FLAT BAD: A LITTLE
MOVING TO AND FROM BED TO CHAIR: A LITTLE
MOVING FROM LYING ON BACK TO SITTING ON SIDE OF FLAT BED WITH BEDRAILS: A LITTLE
DRESSING REGULAR UPPER BODY CLOTHING: A LITTLE
MOBILITY SCORE: 17
CLIMB 3 TO 5 STEPS WITH RAILING: A LITTLE
DRESSING REGULAR LOWER BODY CLOTHING: A LITTLE
STANDING UP FROM CHAIR USING ARMS: A LITTLE
TOILETING: A LITTLE

## 2024-02-07 ASSESSMENT — PAIN SCALES - WONG BAKER: WONGBAKER_NUMERICALRESPONSE: NO HURT

## 2024-02-07 ASSESSMENT — PAIN SCALES - GENERAL
PAINLEVEL_OUTOF10: 0 - NO PAIN
PAINLEVEL_OUTOF10: 0 - NO PAIN
PAINLEVEL_OUTOF10: 3
PAINLEVEL_OUTOF10: 0 - NO PAIN

## 2024-02-07 ASSESSMENT — PAIN - FUNCTIONAL ASSESSMENT
PAIN_FUNCTIONAL_ASSESSMENT: 0-10
PAIN_FUNCTIONAL_ASSESSMENT: 0-10

## 2024-02-07 ASSESSMENT — PAIN DESCRIPTION - LOCATION: LOCATION: HEAD

## 2024-02-07 NOTE — PROGRESS NOTES
"Demarco Gudino is a 83 y.o. male on day 0 of admission presenting with Acute kidney injury (CMS/HCC).    Subjective   Seen yesterday for acute kidney injury hypotension he feels overall much better no dizziness or any lightheadedness however he is complaining of sinus pressure and cannot breathe through his nose       Objective     Physical Exam  Neck:      Vascular: No carotid bruit.   Cardiovascular:      Rate and Rhythm: Normal rate and regular rhythm.      Heart sounds: No murmur heard.     No friction rub. No gallop.   Pulmonary:      Breath sounds: No wheezing, rhonchi or rales.   Chest:      Chest wall: No tenderness.   Abdominal:      General: There is no distension.      Tenderness: There is no abdominal tenderness. There is no guarding or rebound.   Musculoskeletal:         General: No swelling or tenderness.      Cervical back: Neck supple.      Right lower leg: No edema.      Left lower leg: No edema.   Lymphadenopathy:      Cervical: No cervical adenopathy.         Last Recorded Vitals  Blood pressure 112/70, pulse 63, temperature 37 °C (98.6 °F), temperature source Temporal, resp. rate 16, height 1.88 m (6' 2\"), weight 79.4 kg (175 lb), SpO2 98 %.    Intake/Output last 3 Shifts:  I/O last 3 completed shifts:  In: 4500 (56.7 mL/kg) [P.O.:2580; I.V.:920 (11.6 mL/kg); IV Piggyback:1000]  Out: 4047 (51 mL/kg) [Urine:4047 (1.4 mL/kg/hr)]  Weight: 79.4 kg     Current Facility-Administered Medications:     acetaminophen (Tylenol) tablet 650 mg, 650 mg, oral, q6h PRN, Carmen Luther APRN-CNP    azelastine (Astelin) 137 mcg (0.1 %) nasal spray 1 spray, 1 spray, Each Nostril, BID, Montez Calvin MD, 1 spray at 02/06/24 2115    bisacodyl (Dulcolax) EC tablet 5 mg, 5 mg, oral, Daily PRN, Carmen Luther APRN-CNP    budesonide EC (Entocort EC) 24 hr capsule 3 mg, 3 mg, oral, Daily, Montez Calvin MD, 3 mg at 02/06/24 0843    docusate sodium (Colace) capsule 100 mg, 100 mg, oral, BID, Carmen Luther, APRN-CNP, 100 " mg at 02/06/24 2113    gabapentin (Neurontin) capsule 100 mg, 100 mg, oral, TID, Montez Calvin MD, 100 mg at 02/06/24 2113    heparin (porcine) injection 5,000 Units, 5,000 Units, subcutaneous, q12h, BHAVANI Da Silva, 5,000 Units at 02/06/24 2114    loratadine (Claritin) tablet 10 mg, 10 mg, oral, Daily, Montez Calvin MD, 10 mg at 02/06/24 0843    melatonin tablet 3 mg, 3 mg, oral, Nightly, Montez Calvin MD, 3 mg at 02/06/24 2113    memantine (Namenda) tablet 2.5 mg, 2.5 mg, oral, BID, Montez Calvin MD, 2.5 mg at 02/06/24 2114    nystatin (Mycostatin) 100,000 unit/gram powder 1 Application, 1 Application, Topical, BID, Marty R Lejeune, DO, 1 Application at 02/07/24 0521    polyethylene glycol (Glycolax, Miralax) packet 17 g, 17 g, oral, Daily, SERGIO Da Silva-CNP, 17 g at 02/06/24 0924    polyvinyl alcohol (Liquifilm Tears) 1.4 % ophthalmic solution 2 drop, 2 drop, Both Eyes, TID PRN, Montez Calvin MD    simvastatin (Zocor) tablet 20 mg, 20 mg, oral, Nightly, Montez Calvin MD, 20 mg at 02/06/24 2114    sodium chloride 0.9% infusion, 100 mL/hr, intravenous, Continuous, Montez Calvin MD, Last Rate: 100 mL/hr at 02/07/24 0321, 100 mL/hr at 02/07/24 0321    tamsulosin (Flomax) 24 hr capsule 0.4 mg, 0.4 mg, oral, Daily, BHAVANI Da Silva, 0.4 mg at 02/06/24 1009    traZODone (Desyrel) tablet 100 mg, 100 mg, oral, Nightly, Montez Calvin MD, 100 mg at 02/06/24 2114    venlafaxine (Effexor) tablet 50 mg, 50 mg, oral, Daily before breakfast, Montez Calvin MD, 50 mg at 02/07/24 0521   Relevant Results    Results for orders placed or performed during the hospital encounter of 02/05/24 (from the past 96 hour(s))   Influenza A, and B PCR   Result Value Ref Range    Flu A Result Not Detected Not Detected    Flu B Result Not Detected Not Detected   SARS-CoV-2 RT PCR   Result Value Ref Range    Coronavirus 2019, PCR Not Detected Not Detected   ECG 12 lead   Result Value Ref Range    Ventricular Rate 81 BPM     Atrial Rate 81 BPM    WI Interval 202 ms    QRS Duration 78 ms    QT Interval 376 ms    QTC Calculation(Bazett) 436 ms    P Axis 55 degrees    R Axis 52 degrees    T Axis 46 degrees    QRS Count 14 beats    Q Onset 228 ms    P Onset 127 ms    P Offset 196 ms    T Offset 416 ms    QTC Fredericia 415 ms   Comprehensive metabolic panel   Result Value Ref Range    Glucose 116 (H) 65 - 99 mg/dL    Sodium 139 133 - 145 mmol/L    Potassium 4.0 3.4 - 5.1 mmol/L    Chloride 103 97 - 107 mmol/L    Bicarbonate 23 (L) 24 - 31 mmol/L    Urea Nitrogen 25 8 - 25 mg/dL    Creatinine 2.20 (H) 0.40 - 1.60 mg/dL    eGFR 29 (L) >60 mL/min/1.73m*2    Calcium 9.2 8.5 - 10.4 mg/dL    Albumin 3.6 3.5 - 5.0 g/dL    Alkaline Phosphatase 101 35 - 125 U/L    Total Protein 6.5 5.9 - 7.9 g/dL    AST 11 5 - 40 U/L    Bilirubin, Total 0.3 0.1 - 1.2 mg/dL    ALT 7 5 - 40 U/L    Anion Gap 13 <=19 mmol/L   Serial Troponin, Initial ([facility])   Result Value Ref Range    Troponin T, High Sensitivity 57 (HH) <=14 ng/L   CBC and Auto Differential   Result Value Ref Range    WBC 6.4 4.4 - 11.3 x10*3/uL    nRBC 0.0 0.0 - 0.0 /100 WBCs    RBC 3.50 (L) 4.50 - 5.90 x10*6/uL    Hemoglobin 12.0 (L) 13.5 - 17.5 g/dL    Hematocrit 34.8 (L) 41.0 - 52.0 %    MCV 99 80 - 100 fL    MCH 34.3 (H) 26.0 - 34.0 pg    MCHC 34.5 32.0 - 36.0 g/dL    RDW 12.3 11.5 - 14.5 %    Platelets 199 150 - 450 x10*3/uL    Neutrophils % 77.1 40.0 - 80.0 %    Immature Granulocytes %, Automated 0.6 0.0 - 0.9 %    Lymphocytes % 15.2 13.0 - 44.0 %    Monocytes % 5.7 2.0 - 10.0 %    Eosinophils % 0.6 0.0 - 6.0 %    Basophils % 0.8 0.0 - 2.0 %    Neutrophils Absolute 4.96 1.60 - 5.50 x10*3/uL    Immature Granulocytes Absolute, Automated 0.04 0.00 - 0.50 x10*3/uL    Lymphocytes Absolute 0.98 0.80 - 3.00 x10*3/uL    Monocytes Absolute 0.37 0.05 - 0.80 x10*3/uL    Eosinophils Absolute 0.04 0.00 - 0.40 x10*3/uL    Basophils Absolute 0.05 0.00 - 0.10 x10*3/uL   Urinalysis with Reflex Culture and  Microscopic   Result Value Ref Range    Color, Urine Light-Yellow Light-Yellow, Yellow, Dark-Yellow    Appearance, Urine Clear Clear    Specific Gravity, Urine 1.006 1.005 - 1.035    pH, Urine 5.0 5.0, 5.5, 6.0, 6.5, 7.0, 7.5, 8.0    Protein, Urine NEGATIVE NEGATIVE, 10 (TRACE), 20 (TRACE) mg/dL    Glucose, Urine Normal Normal mg/dL    Blood, Urine NEGATIVE NEGATIVE    Ketones, Urine NEGATIVE NEGATIVE mg/dL    Bilirubin, Urine NEGATIVE NEGATIVE    Urobilinogen, Urine Normal Normal mg/dL    Nitrite, Urine NEGATIVE NEGATIVE    Leukocyte Esterase, Urine NEGATIVE NEGATIVE   Extra Urine Gray Tube   Result Value Ref Range    Extra Tube Hold for add-ons.    Serial Troponin, 2 Hour ([facility])   Result Value Ref Range    Troponin T, High Sensitivity 53 (HH) <=14 ng/L   Basic Metabolic Panel   Result Value Ref Range    Glucose 120 (H) 65 - 99 mg/dL    Sodium 140 133 - 145 mmol/L    Potassium 3.9 3.4 - 5.1 mmol/L    Chloride 108 (H) 97 - 107 mmol/L    Bicarbonate 22 (L) 24 - 31 mmol/L    Urea Nitrogen 25 8 - 25 mg/dL    Creatinine 1.90 (H) 0.40 - 1.60 mg/dL    eGFR 35 (L) >60 mL/min/1.73m*2    Calcium 8.3 (L) 8.5 - 10.4 mg/dL    Anion Gap 10 <=19 mmol/L   Troponin T   Result Value Ref Range    Troponin T, High Sensitivity 43 (HH) <=14 ng/L   CBC   Result Value Ref Range    WBC 4.5 4.4 - 11.3 x10*3/uL    nRBC 0.0 0.0 - 0.0 /100 WBCs    RBC 2.94 (L) 4.50 - 5.90 x10*6/uL    Hemoglobin 10.0 (L) 13.5 - 17.5 g/dL    Hematocrit 29.4 (L) 41.0 - 52.0 %     80 - 100 fL    MCH 34.0 26.0 - 34.0 pg    MCHC 34.0 32.0 - 36.0 g/dL    RDW 12.2 11.5 - 14.5 %    Platelets 158 150 - 450 x10*3/uL   Basic Metabolic Panel   Result Value Ref Range    Glucose 90 65 - 99 mg/dL    Sodium 141 133 - 145 mmol/L    Potassium 4.2 3.4 - 5.1 mmol/L    Chloride 111 (H) 97 - 107 mmol/L    Bicarbonate 21 (L) 24 - 31 mmol/L    Urea Nitrogen 18 8 - 25 mg/dL    Creatinine 1.50 0.40 - 1.60 mg/dL    eGFR 46 (L) >60 mL/min/1.73m*2    Calcium 8.4 (L) 8.5 - 10.4 mg/dL     Anion Gap 9 <=19 mmol/L       Assessment/Plan    acute kidney injury renal function is improving we will discontinue IV fluids today CAT scan showed no evidence of obstruction   severe hypotension secondary dehydration multiple antihypertensive medications much improved   large prostate   history of hypertension   Hyperlipidemia  Abdominal aortic aneurysm will consult vascular                  Josh Tiwari MD

## 2024-02-07 NOTE — PROGRESS NOTES
Demarco Gudino is a 83 y.o. male on day 0 of admission presenting with Acute kidney injury (CMS/HCC).      Subjective   Patient seen and examined. Resting in bed. States he has a lot of nasal congestion, has chronic sinus problems. Denies any dizziness. No abd pain or nausea. No BM yet. He is passing gas.        Objective     Last Recorded Vitals  /80 (BP Location: Right arm, Patient Position: Lying)   Pulse 62   Temp 36 °C (96.8 °F) (Temporal)   Resp 15   Wt 79.4 kg (175 lb)   SpO2 96%   Intake/Output last 3 Shifts:    Intake/Output Summary (Last 24 hours) at 2/7/2024 1033  Last data filed at 2/7/2024 0528  Gross per 24 hour   Intake 1460 ml   Output 3122 ml   Net -1662 ml       Admission Weight  Weight: 79.4 kg (175 lb) (02/05/24 1434)    Daily Weight  02/05/24 : 79.4 kg (175 lb)    Image Results    No new imaging to review.     Physical Exam    General: Alert and oriented x3, pleasant.   Cardiac: Regular rate and rhythm, S1/S2 , no murmur.   Pulmonary: Clear to auscultation on room air.   Abdomen: Soft, round, nontender. BS +x4.   Extremities: No edema.  Skin: No rashes or lesions.    Relevant Results    Scheduled medications  azelastine, 1 spray, Each Nostril, BID  budesonide EC, 3 mg, oral, Daily  docusate sodium, 100 mg, oral, BID  gabapentin, 100 mg, oral, TID  heparin, 5,000 Units, subcutaneous, q12h  loratadine, 10 mg, oral, Daily  melatonin, 3 mg, oral, Nightly  memantine, 2.5 mg, oral, BID  nystatin, 1 Application, Topical, BID  polyethylene glycol, 17 g, oral, Daily  simvastatin, 20 mg, oral, Nightly  tamsulosin, 0.4 mg, oral, Daily  traZODone, 100 mg, oral, Nightly  venlafaxine, 50 mg, oral, Daily before breakfast      Continuous medications     PRN medications  PRN medications: acetaminophen, bisacodyl, polyvinyl alcohol     Results for orders placed or performed during the hospital encounter of 02/05/24 (from the past 24 hour(s))   CBC   Result Value Ref Range    WBC 4.5 4.4 - 11.3 x10*3/uL     nRBC 0.0 0.0 - 0.0 /100 WBCs    RBC 2.94 (L) 4.50 - 5.90 x10*6/uL    Hemoglobin 10.0 (L) 13.5 - 17.5 g/dL    Hematocrit 29.4 (L) 41.0 - 52.0 %     80 - 100 fL    MCH 34.0 26.0 - 34.0 pg    MCHC 34.0 32.0 - 36.0 g/dL    RDW 12.2 11.5 - 14.5 %    Platelets 158 150 - 450 x10*3/uL   Basic Metabolic Panel   Result Value Ref Range    Glucose 90 65 - 99 mg/dL    Sodium 141 133 - 145 mmol/L    Potassium 4.2 3.4 - 5.1 mmol/L    Chloride 111 (H) 97 - 107 mmol/L    Bicarbonate 21 (L) 24 - 31 mmol/L    Urea Nitrogen 18 8 - 25 mg/dL    Creatinine 1.50 0.40 - 1.60 mg/dL    eGFR 46 (L) >60 mL/min/1.73m*2    Calcium 8.4 (L) 8.5 - 10.4 mg/dL    Anion Gap 9 <=19 mmol/L           Assessment/Plan      Acute Renal Failure  -Likely from urinary retention secondary to constipation, hypotension.   -Renal follows.   -IVF stopped today.   -Bladder scan showed 675 ml in the bladder, cabrera was placed.   -Creat improved, down 1.5 today.   -Bowel regimen, continue, awaiting BM for trial void.   -Holding renal toxic meds.      Weakness  -PT/OT recommend low intensity therapy. Plan likely for [facility] therapy.    -OOB with assist only.      HTN  -Holding amlodipine, lasix, lisinopril due to renal function and low BP.   -Monitor BP closely and can slowly introduce medication pending his renal function. Currently BP is stable.    HLD  -Continue statin.      DVT Risk  -Heparin subcutaneous.   -SCDs.      Plan  Nephrology follows.    Maintain cabrera for now for retention, awaiting BM, continue current bowel regimen. Will trial void once he has good BM.   IVF stopped today per renal.   Holding renal toxic meds.   Monitor renal function closely. Slowly improving.   Add mucinex for his nasal congestion.   PT/OT recommends low intensity therapy, plan likely for [facility] at [place]. He is already active with house call program.         SERGIO Da Silva-CNP

## 2024-02-07 NOTE — PROGRESS NOTES
Physical Therapy                 Therapy Communication Note    Patient Name: Demarco Gudino  MRN: [id number]  Today's Date: 2/7/2024     Discipline: Physical Therapy    Missed Visit Reason: Missed Visit Reason: Patient refused (Pt declined visit at this time depsite education and encouragement.)    Missed Time: Attempt    Comment:

## 2024-02-07 NOTE — CARE PLAN
Problem: Fall/Injury  Goal: Not fall by end of shift  Outcome: Progressing  Goal: Be free from injury by end of the shift  Outcome: Progressing  Goal: Verbalize understanding of personal risk factors for fall in the hospital  Outcome: Progressing  Goal: Verbalize understanding of risk factor reduction measures to prevent injury from fall in the home  Outcome: Progressing  Goal: Use assistive devices by end of the shift  Outcome: Progressing  Goal: Pace activities to prevent fatigue by end of the shift  Outcome: Progressing   The patient's goals for the shift include      The clinical goals for the shift include comfort and safety, no falls, constipation management, improve urinary retention, and promote rest.

## 2024-02-07 NOTE — CARE PLAN
The patient's goals for the shift include  rest and have a BM    The clinical goals for the shift include comfort and safety, no falls, constipation management, improve urinary retention, and promote rest.      02/06/24 at 8:04 PM - Sunni Garcia RN

## 2024-02-08 LAB
ALBUMIN SERPL-MCNC: 2.9 G/DL (ref 3.5–5)
ANION GAP SERPL CALC-SCNC: 8 MMOL/L
ATRIAL RATE: 81 BPM
BUN SERPL-MCNC: 14 MG/DL (ref 8–25)
CALCIUM SERPL-MCNC: 8.5 MG/DL (ref 8.5–10.4)
CHLORIDE SERPL-SCNC: 109 MMOL/L (ref 97–107)
CO2 SERPL-SCNC: 23 MMOL/L (ref 24–31)
CREAT SERPL-MCNC: 1.3 MG/DL (ref 0.4–1.6)
EGFRCR SERPLBLD CKD-EPI 2021: 55 ML/MIN/1.73M*2
GLUCOSE SERPL-MCNC: 90 MG/DL (ref 65–99)
P AXIS: 55 DEGREES
P OFFSET: 196 MS
P ONSET: 127 MS
PHOSPHATE SERPL-MCNC: 3.3 MG/DL (ref 2.5–4.5)
POTASSIUM SERPL-SCNC: 4 MMOL/L (ref 3.4–5.1)
PR INTERVAL: 202 MS
Q ONSET: 228 MS
QRS COUNT: 14 BEATS
QRS DURATION: 78 MS
QT INTERVAL: 376 MS
QTC CALCULATION(BAZETT): 436 MS
QTC FREDERICIA: 415 MS
R AXIS: 52 DEGREES
SODIUM SERPL-SCNC: 140 MMOL/L (ref 133–145)
T AXIS: 46 DEGREES
T OFFSET: 416 MS
VENTRICULAR RATE: 81 BPM

## 2024-02-08 PROCEDURE — 2500000001 HC RX 250 WO HCPCS SELF ADMINISTERED DRUGS (ALT 637 FOR MEDICARE OP): Performed by: FAMILY MEDICINE

## 2024-02-08 PROCEDURE — 2500000004 HC RX 250 GENERAL PHARMACY W/ HCPCS (ALT 636 FOR OP/ED): Performed by: FAMILY MEDICINE

## 2024-02-08 PROCEDURE — 1100000001 HC PRIVATE ROOM DAILY

## 2024-02-08 PROCEDURE — 80069 RENAL FUNCTION PANEL: CPT | Performed by: INTERNAL MEDICINE

## 2024-02-08 PROCEDURE — 97110 THERAPEUTIC EXERCISES: CPT | Mod: GO,CO

## 2024-02-08 PROCEDURE — 97116 GAIT TRAINING THERAPY: CPT | Mod: GP,CQ

## 2024-02-08 PROCEDURE — 36415 COLL VENOUS BLD VENIPUNCTURE: CPT | Performed by: INTERNAL MEDICINE

## 2024-02-08 PROCEDURE — 97110 THERAPEUTIC EXERCISES: CPT | Mod: GP,CQ

## 2024-02-08 PROCEDURE — 97535 SELF CARE MNGMENT TRAINING: CPT | Mod: GO,CO

## 2024-02-08 PROCEDURE — 2500000004 HC RX 250 GENERAL PHARMACY W/ HCPCS (ALT 636 FOR OP/ED): Performed by: NURSE PRACTITIONER

## 2024-02-08 PROCEDURE — 2500000001 HC RX 250 WO HCPCS SELF ADMINISTERED DRUGS (ALT 637 FOR MEDICARE OP): Performed by: NURSE PRACTITIONER

## 2024-02-08 RX ORDER — AMLODIPINE BESYLATE 5 MG/1
5 TABLET ORAL DAILY
Status: DISCONTINUED | OUTPATIENT
Start: 2024-02-08 | End: 2024-02-08

## 2024-02-08 RX ORDER — AMLODIPINE BESYLATE 10 MG/1
10 TABLET ORAL DAILY
Status: DISCONTINUED | OUTPATIENT
Start: 2024-02-09 | End: 2024-02-09 | Stop reason: HOSPADM

## 2024-02-08 RX ORDER — HYDROXYZINE HYDROCHLORIDE 25 MG/1
25 TABLET, FILM COATED ORAL EVERY 6 HOURS PRN
Status: DISCONTINUED | OUTPATIENT
Start: 2024-02-08 | End: 2024-02-09 | Stop reason: HOSPADM

## 2024-02-08 RX ADMIN — Medication 3 MG: at 22:15

## 2024-02-08 RX ADMIN — AZELASTINE HYDROCHLORIDE 1 SPRAY: 137 SPRAY, METERED NASAL at 22:16

## 2024-02-08 RX ADMIN — DOCUSATE SODIUM 100 MG: 100 CAPSULE, LIQUID FILLED ORAL at 10:45

## 2024-02-08 RX ADMIN — NYSTATIN 1 APPLICATION: 100000 POWDER TOPICAL at 22:16

## 2024-02-08 RX ADMIN — HEPARIN SODIUM 5000 UNITS: 5000 INJECTION, SOLUTION INTRAVENOUS; SUBCUTANEOUS at 10:45

## 2024-02-08 RX ADMIN — HYDROXYZINE HYDROCHLORIDE 25 MG: 25 TABLET, FILM COATED ORAL at 22:21

## 2024-02-08 RX ADMIN — HEPARIN SODIUM 5000 UNITS: 5000 INJECTION, SOLUTION INTRAVENOUS; SUBCUTANEOUS at 22:15

## 2024-02-08 RX ADMIN — HYDROXYZINE HYDROCHLORIDE 25 MG: 25 TABLET, FILM COATED ORAL at 12:21

## 2024-02-08 RX ADMIN — GABAPENTIN 100 MG: 100 CAPSULE ORAL at 10:45

## 2024-02-08 RX ADMIN — AMLODIPINE BESYLATE 5 MG: 5 TABLET ORAL at 11:47

## 2024-02-08 RX ADMIN — TRAZODONE HYDROCHLORIDE 100 MG: 100 TABLET ORAL at 22:15

## 2024-02-08 RX ADMIN — ACETAMINOPHEN 650 MG: 325 TABLET ORAL at 10:54

## 2024-02-08 RX ADMIN — LORATADINE 10 MG: 10 TABLET ORAL at 10:45

## 2024-02-08 RX ADMIN — BUDESONIDE 3 MG: 3 CAPSULE, GELATIN COATED ORAL at 10:53

## 2024-02-08 RX ADMIN — AZELASTINE HYDROCHLORIDE 1 SPRAY: 137 SPRAY, METERED NASAL at 10:52

## 2024-02-08 RX ADMIN — GUAIFENESIN 600 MG: 600 TABLET ORAL at 22:21

## 2024-02-08 RX ADMIN — GABAPENTIN 100 MG: 100 CAPSULE ORAL at 14:25

## 2024-02-08 RX ADMIN — NYSTATIN 1 APPLICATION: 100000 POWDER TOPICAL at 10:53

## 2024-02-08 RX ADMIN — MEMANTINE 2.5 MG: 5 TABLET ORAL at 10:45

## 2024-02-08 RX ADMIN — TAMSULOSIN HYDROCHLORIDE 0.4 MG: 0.4 CAPSULE ORAL at 10:45

## 2024-02-08 RX ADMIN — MEMANTINE 2.5 MG: 5 TABLET ORAL at 22:15

## 2024-02-08 RX ADMIN — VENLAFAXINE HYDROCHLORIDE 50 MG: 50 TABLET ORAL at 06:44

## 2024-02-08 RX ADMIN — POLYETHYLENE GLYCOL 3350 17 G: 17 POWDER, FOR SOLUTION ORAL at 10:45

## 2024-02-08 RX ADMIN — SIMVASTATIN 20 MG: 20 TABLET, FILM COATED ORAL at 22:15

## 2024-02-08 RX ADMIN — GABAPENTIN 100 MG: 100 CAPSULE ORAL at 22:15

## 2024-02-08 ASSESSMENT — COGNITIVE AND FUNCTIONAL STATUS - GENERAL
TURNING FROM BACK TO SIDE WHILE IN FLAT BAD: A LITTLE
TOILETING: A LITTLE
CLIMB 3 TO 5 STEPS WITH RAILING: A LITTLE
WALKING IN HOSPITAL ROOM: A LITTLE
HELP NEEDED FOR BATHING: A LITTLE
STANDING UP FROM CHAIR USING ARMS: A LITTLE
DRESSING REGULAR UPPER BODY CLOTHING: A LITTLE
MOBILITY SCORE: 18
STANDING UP FROM CHAIR USING ARMS: A LITTLE
DRESSING REGULAR LOWER BODY CLOTHING: A LITTLE
MOVING TO AND FROM BED TO CHAIR: A LITTLE
DRESSING REGULAR LOWER BODY CLOTHING: A LITTLE
CLIMB 3 TO 5 STEPS WITH RAILING: A LOT
MOVING FROM LYING ON BACK TO SITTING ON SIDE OF FLAT BED WITH BEDRAILS: A LITTLE
WALKING IN HOSPITAL ROOM: A LITTLE
DAILY ACTIVITIY SCORE: 20
MOBILITY SCORE: 17
WALKING IN HOSPITAL ROOM: A LITTLE
MOVING TO AND FROM BED TO CHAIR: A LITTLE
STANDING UP FROM CHAIR USING ARMS: A LITTLE
TOILETING: A LITTLE
DAILY ACTIVITIY SCORE: 20
TURNING FROM BACK TO SIDE WHILE IN FLAT BAD: A LITTLE
HELP NEEDED FOR BATHING: A LITTLE
HELP NEEDED FOR BATHING: A LITTLE
CLIMB 3 TO 5 STEPS WITH RAILING: A LITTLE
MOVING FROM LYING ON BACK TO SITTING ON SIDE OF FLAT BED WITH BEDRAILS: A LITTLE
MOVING FROM LYING ON BACK TO SITTING ON SIDE OF FLAT BED WITH BEDRAILS: A LITTLE
DRESSING REGULAR UPPER BODY CLOTHING: A LITTLE
MOVING TO AND FROM BED TO CHAIR: A LITTLE
MOBILITY SCORE: 18
DRESSING REGULAR UPPER BODY CLOTHING: A LITTLE
TURNING FROM BACK TO SIDE WHILE IN FLAT BAD: A LITTLE
DRESSING REGULAR LOWER BODY CLOTHING: A LITTLE

## 2024-02-08 ASSESSMENT — PAIN - FUNCTIONAL ASSESSMENT
PAIN_FUNCTIONAL_ASSESSMENT: 0-10
PAIN_FUNCTIONAL_ASSESSMENT: 0-10

## 2024-02-08 ASSESSMENT — PAIN SCALES - GENERAL
PAINLEVEL_OUTOF10: 0 - NO PAIN
PAINLEVEL_OUTOF10: 7
PAINLEVEL_OUTOF10: 4

## 2024-02-08 NOTE — NURSING NOTE
Agree with previous assessment. Patient had large bowel movement during shift change and was incontinent of stool during the night. Patient washed up. [place] to CD with clear yellow urine output. Call light within reach. Nursing to monitor.

## 2024-02-08 NOTE — PROGRESS NOTES
Met with patient bedside.  Patient a little anxious. Discussed discharge planning again.  Yesterday agreed to [facility] but was also tired, so, I went over things again.    He really doesn't want people at his home.  Wants papers with exercises.  Will speak with therapy.    Home no needs.  Kassie Patel RN

## 2024-02-08 NOTE — CARE PLAN
The patient's goals for the shift include      The clinical goals for the shift include remain free from falls      Problem: Fall/Injury  Goal: Not fall by end of shift  Outcome: Progressing  Goal: Be free from injury by end of the shift  Outcome: Progressing  Goal: Verbalize understanding of personal risk factors for fall in the hospital  Outcome: Progressing  Goal: Verbalize understanding of risk factor reduction measures to prevent injury from fall in the home  Outcome: Progressing  Goal: Use assistive devices by end of the shift  Outcome: Progressing  Goal: Pace activities to prevent fatigue by end of the shift  Outcome: Progressing

## 2024-02-08 NOTE — PROGRESS NOTES
Demarco Gudino is a 83 y.o. male on day 1 of admission presenting with Acute kidney injury (CMS/HCC).      Subjective   Patient seen and examined. Resting in bed. Appears anxious today, states he got in an argument with his spouse on the phone and he was a little worked up. Had large BM's overnight. Carson removed at this time for trial void.        Objective     Last Recorded Vitals  BP (!) 141/94 (BP Location: Right arm, Patient Position: Sitting)   Pulse 60   Temp 36.7 °C (98.1 °F) (Temporal)   Resp 16   Wt 79.4 kg (175 lb)   SpO2 95%   Intake/Output last 3 Shifts:    Intake/Output Summary (Last 24 hours) at 2/8/2024 1217  Last data filed at 2/8/2024 1100  Gross per 24 hour   Intake 610 ml   Output 2200 ml   Net -1590 ml       Admission Weight  Weight: 79.4 kg (175 lb) (02/05/24 1434)    Daily Weight  02/05/24 : 79.4 kg (175 lb)    Image Results    No new imaging to review.     Physical Exam    General: Alert and oriented x3, pleasant, somewhat anxious today.   Cardiac: Regular rate and rhythm, S1/S2 , no murmur.   Pulmonary: Clear to auscultation on room air.   Abdomen: Soft, round, nontender. BS +x4.   Extremities: No edema.  Skin: No rashes or lesions.    Relevant Results    Scheduled medications  amLODIPine, 5 mg, oral, Daily  azelastine, 1 spray, Each Nostril, BID  budesonide EC, 3 mg, oral, Daily  docusate sodium, 100 mg, oral, BID  gabapentin, 100 mg, oral, TID  heparin, 5,000 Units, subcutaneous, q12h  loratadine, 10 mg, oral, Daily  melatonin, 3 mg, oral, Nightly  memantine, 2.5 mg, oral, BID  nystatin, 1 Application, Topical, BID  polyethylene glycol, 17 g, oral, Daily  simvastatin, 20 mg, oral, Nightly  tamsulosin, 0.4 mg, oral, Daily  traZODone, 100 mg, oral, Nightly  venlafaxine, 50 mg, oral, Daily before breakfast      Continuous medications     PRN medications  PRN medications: acetaminophen, bisacodyl, guaiFENesin, hydrOXYzine HCL, polyvinyl alcohol     Results for orders placed or performed  during the hospital encounter of 02/05/24 (from the past 24 hour(s))   Renal Function Panel   Result Value Ref Range    Glucose 90 65 - 99 mg/dL    Sodium 140 133 - 145 mmol/L    Potassium 4.0 3.4 - 5.1 mmol/L    Chloride 109 (H) 97 - 107 mmol/L    Bicarbonate 23 (L) 24 - 31 mmol/L    Urea Nitrogen 14 8 - 25 mg/dL    Creatinine 1.30 0.40 - 1.60 mg/dL    eGFR 55 (L) >60 mL/min/1.73m*2    Calcium 8.5 8.5 - 10.4 mg/dL    Phosphorus 3.3 2.5 - 4.5 mg/dL    Albumin 2.9 (L) 3.5 - 5.0 g/dL    Anion Gap 8 <=19 mmol/L           Assessment/Plan      Acute Renal Failure  -Likely from urinary retention secondary to constipation, hypotension.   -Renal follows, signed off today.    -Stable off of the IVF.   -Bladder scan showed 675 ml in the bladder, cabrera was placed.   -Creat improved, down 1.3 today.   -Bowel regimen, continue, had a few large BM's. Trial void today, cabrera just removed.   -Holding renal toxic meds. Resume his amlodipine today as his BP is rising, will likely not DC on his lasix and lisinopril.      Weakness  -PT/OT recommend low intensity therapy. Plan likely for [facility] therapy.  He has some hesitancy about going home. He just declined [facility] when asked by  to arrange.   -OOB with assist only.      HTN  -Holding amlodipine, lasix, lisinopril due to renal function and low BP. Resume amlodipine today as his BP is rising. Monitor.      HLD  -Continue statin.      DVT Risk  -Heparin subcutaneous.   -SCDs.      Plan  Nephrology follows, signed off today.    Had large BM, remove cabrera today and trial void.   Creat continues to improve, down to 1.3.   Holding renal toxic meds. Likely will not resume lisinopril and lasix upon DC.   PRN vistaril for anxiety.   PT/OT recommends low intensity therapy, he declined [facility] when asked today by care coordinator. He is already active with house call program.   Anticipate DC today. CM investigating which pharmacy he gets his medications from as they come pre-packed in packages  and we will be discontinuing 2 medications.         Carmen Luther, APRN-CNP

## 2024-02-08 NOTE — PROGRESS NOTES
Acute Kidney Injury is resolved, will sign off at this time, please call back with any questions, thank you.    Mitchel Tiwari MD

## 2024-02-08 NOTE — CARE PLAN
Problem: Fall/Injury  Goal: Not fall by end of shift  Outcome: Progressing  Goal: Be free from injury by end of the shift  Outcome: Progressing  Goal: Verbalize understanding of personal risk factors for fall in the hospital  Outcome: Progressing  Goal: Verbalize understanding of risk factor reduction measures to prevent injury from fall in the home  Outcome: Progressing  Goal: Use assistive devices by end of the shift  Outcome: Progressing  Goal: Pace activities to prevent fatigue by end of the shift  Outcome: Progressing   The patient's goals for the shift include      The clinical goals for the shift include remain free from falls

## 2024-02-08 NOTE — PROGRESS NOTES
Physical Therapy    Physical Therapy Treatment    Patient Name: Demarco Gudino  MRN: [id number]  Today's Date: 2/8/2024  Time Calculation  Start Time: 0922  Stop Time: 0947  Time Calculation (min): 25 min       Assessment/Plan   PT Assessment  PT Assessment Results: Decreased strength, Decreased endurance, Impaired balance, Decreased mobility, Decreased safety awareness, Impaired judgement  Rehab Prognosis: Good  End of Session Patient Position: Bed, 3 rail up, Alarm on  PT Plan  Inpatient/Swing Bed or Outpatient: Inpatient  PT Plan  Treatment/Interventions: Bed mobility, Transfer training, Gait training, Therapeutic exercise  PT Plan: Skilled PT  PT Frequency: 4 times per week  PT Discharge Recommendations: Low intensity level of continued care, 24 hr supervision due to cognition  Equipment Recommended upon Discharge: Wheeled walker  PT Recommended Transfer Status: Assist x1, Assistive device  PT - OK to Discharge: Yes      General Visit Information:   PT  Visit  PT Received On: 02/08/24  General  Prior to Session Communication: Bedside nurse  Patient Position Received: Up in chair, Alarm on  Preferred Learning Style: verbal  General Comment: Pt. agreeable to treatment    Subjective   Precautions:  Precautions  Hearing/Visual Limitations: glasses  Medical Precautions: Fall precautions  Vital Signs:       Objective   Pain:  Pain Assessment  Pain Assessment: 0-10  Pain Score: 7  Pain Type: Chronic pain  Pain Location: Shoulder  Pain Orientation: Left  Cognition:  Cognition  Overall Cognitive Status: Within Functional Limits  Orientation Level: Oriented X4  Postural Control:     Extremity/Trunk Assessments:    Activity Tolerance:     Treatments:  Therapeutic Exercise  Therapeutic Exercise Performed: Yes  Therapeutic Exercise Activity 1: Pt performed seated bilat LE ankle pumps/heel raises, LAQ, hip flexion, chiki hip adduction, resisted hip abduction x15 reps each. Verbal cues to perform slowly.    Bed Mobility  Bed  Mobility: Yes  Bed Mobility 1  Bed Mobility 1: Sitting to supine  Level of Assistance 1: Close supervision    Ambulation/Gait Training  Ambulation/Gait Training Performed: Yes  Ambulation/Gait Training 1  Surface 1: Level tile  Device 1: Rolling walker  Assistance 1: Contact guard  Comments/Distance (ft) 1: Pt ambulated with RW ~50' x2 CGA.  Transfers  Transfer: Yes  Transfer 1  Transfer From 1: Chair with arms to  Transfer to 1: Stand  Technique 1: Sit to stand  Transfer Device 1: Walker  Transfer Level of Assistance 1: Minimum assistance  Transfers 2  Transfer From 2: Stand to  Transfer to 2: Sit, Bed  Technique 2: Stand to sit  Transfer Level of Assistance 2: Close supervision    Outcome Measures:  [facility] Basic Mobility  Turning from your back to your side while in a flat bed without using bedrails: A little  Moving from lying on your back to sitting on the side of a flat bed without using bedrails: A little  Moving to and from bed to chair (including a wheelchair): A little  Standing up from a chair using your arms (e.g. wheelchair or bedside chair): A little  To walk in hospital room: A little  Climbing 3-5 steps with railing: A little  Basic Mobility - Total Score: 18    Education Documentation  Handouts, taught by Arti Spears PTA at 2/8/2024 10:29 AM.  Learner: Patient  Readiness: Acceptance  Method: Explanation  Response: Verbalizes Understanding, Needs Reinforcement    Precautions, taught by Arti Spears PTA at 2/8/2024 10:29 AM.  Learner: Patient  Readiness: Acceptance  Method: Explanation  Response: Verbalizes Understanding, Needs Reinforcement    Body Mechanics, taught by Arti Spears PTA at 2/8/2024 10:29 AM.  Learner: Patient  Readiness: Acceptance  Method: Explanation  Response: Verbalizes Understanding, Needs Reinforcement    Home Exercise Program, taught by Arti Spears PTA at 2/8/2024 10:29 AM.  Learner: Patient  Readiness: Acceptance  Method: Explanation  Response: Verbalizes Understanding,  Needs Reinforcement    Mobility Training, taught by Arti Spears PTA at 2/8/2024 10:29 AM.  Learner: Patient  Readiness: Acceptance  Method: Explanation  Response: Verbalizes Understanding, Needs Reinforcement    Education Comments  No comments found.        OP EDUCATION:       Encounter Problems       Encounter Problems (Active)       Balance       dynamic (Progressing)       Start:  02/06/24    Expected End:  02/20/24       Pt will perform Tinetti assessment and score >/= 24/28, resulting in a low falls risk             Mobility       LTG - Patient will navigate 4-6 steps with rails/device (Progressing)       Start:  02/06/24    Expected End:  02/20/24            ambulation (Progressing)       Start:  02/06/24    Expected End:  02/20/24       Pt will amb >200 ft with wheeled walker and mod independence          endurance (Progressing)       Start:  02/06/24    Expected End:  02/20/24       Pt will tolerate 6MWT without SOB and with ANI rating 12-16            Safety       LTG - Patient will utilize safety techniques (Progressing)       Start:  02/06/24    Expected End:  02/20/24               Transfers       sit to stand (Progressing)       Start:  02/06/24    Expected End:  02/20/24       Pt will perform sit to stand transfer with LRAD and mod independence.

## 2024-02-08 NOTE — PROGRESS NOTES
Occupational Therapy    OT Treatment    Patient Name: Demarco Gudino  MRN: [id number]  Today's Date: 2/8/2024  Time Calculation  Start Time: 0759  Stop Time: 0835  Time Calculation (min): 36 min         Assessment:  End of Session Patient Position: Up in chair, Alarm on     Plan:  Treatment Interventions: UE strengthening/ROM  OT Frequency: 3 times per week  OT Discharge Recommendations: Low intensity level of continued care, 24 hr supervision due to cognition  Equipment Recommended upon Discharge: Wheeled walker  OT - OK to Discharge: Yes  Treatment Interventions: UE strengthening/ROM    Subjective   Previous Visit Info:  OT Last Visit  OT Received On: 02/08/24  General:  General  Reason for Referral: decrease ADL's  Referred By: Dr. Herrmann  Past Medical History Relevant to Rehab: 84 y/o male who presented with generalized weakness. ANALILIA.  PMH including HTN, HLD, AAA, DJD, OA, depression  Prior to Session Communication: Bedside nurse  Patient Position Received: Bed, 4 rail up, Alarm on  General Comment: Pt. agreeable to treatment     Vital Signs:  Vital Signs  BP: (!) 141/94  BP Location: Right arm  BP Method: Automatic  Patient Position: Sitting  Pain:  Pain Assessment  Pain Assessment: 0-10  Pain Score: 4  Pain Location: Shoulder  Pain Orientation: Right, Left    Objective       Activities of Daily Living: Grooming  Grooming Level of Assistance: Setup  Grooming Where Assessed: Chair  Grooming Comments:  (washed face with wash rag)     Bed Mobility/Transfers: Bed Mobility 1  Bed Mobility 1: Supine to sitting  Level of Assistance 1: Moderate assistance  Bed Mobility Comments 1:  (Pt. c/o shoulder pain and unable to push or pull up)    Transfer 1  Transfer From 1: Bed to  Transfer to 1: Chair with arms  Technique 1:  (ambulate)  Transfer Device 1:  (no device)  Transfer Level of Assistance 1: Close supervision  Trials/Comments 1: ~3-4 steps, fair balance     Therapy/Activity: Therapeutic Exercise  Therapeutic Exercise  Activity 1:  (bicep curls)  Therapeutic Exercise Activity 2: forearm pronation/supanation  Therapeutic Exercise Activity 3: wrist extension/ flexion  Therapeutic Exercise Activity 4: scap protraction/retraction  Therapeutic Exercise Activity 5: external/internal rotation  Therapeutic Exercise Activity 6: Pt. completes 1 set x 10 -20 reps of bicep curls, wrist extension/flexion, pronation/supanation with 1# free weigh. Yellow TBand for external/ internal therex. Pt. limited due to shoulder pain. Therapist assist in UE support.    Outcome Measures:[facility] Daily Activity  Putting on and taking off regular lower body clothing: A little  Bathing (including washing, rinsing, drying): A little  Putting on and taking off regular upper body clothing: A little  Toileting, which includes using toilet, bedpan or urinal: A little  Taking care of personal grooming such as brushing teeth: None  Eating Meals: None  Daily Activity - Total Score: 20    Education Documentation  Home Exercise Program, taught by LIA Romano at 2/8/2024  9:03 AM.  Learner: Patient  Readiness: Acceptance  Method: Explanation, Demonstration  Response: Demonstrated Understanding, Needs Reinforcement    ADL Training, taught by LIA Romano at 2/8/2024  9:03 AM.  Learner: Patient  Readiness: Acceptance  Method: Explanation, Demonstration  Response: Demonstrated Understanding, Needs Reinforcement    Education Comments  No comments found.    Goals:  Encounter Problems       Encounter Problems (Active)       OT Goals       ADL's (Progressing)       Start:  02/06/24    Expected End:  02/20/24       Pt will complete ADL tasks w/ close supervision w/ AE/ compensatory tech for safety and increased independence in self care tasks.         Functional transfers (Progressing)       Start:  02/06/24    Expected End:  02/20/24       Pt will demon. Bed, chair and toilet transfers w/ mod I w/ LRD for safety and increased independence in functional  transfers.         Functional endurance  (Progressing)       Start:  02/06/24    Expected End:  02/20/24       Pt will tolerate 15 minutes of functional activity to improve endurance for self care tasks and functional mobility.

## 2024-02-09 VITALS
SYSTOLIC BLOOD PRESSURE: 120 MMHG | RESPIRATION RATE: 17 BRPM | HEIGHT: 74 IN | TEMPERATURE: 97.9 F | WEIGHT: 175 LBS | HEART RATE: 74 BPM | BODY MASS INDEX: 22.46 KG/M2 | OXYGEN SATURATION: 96 % | DIASTOLIC BLOOD PRESSURE: 83 MMHG

## 2024-02-09 PROBLEM — N17.9 ACUTE KIDNEY INJURY (CMS-HCC): Status: RESOLVED | Noted: 2024-02-05 | Resolved: 2024-02-09

## 2024-02-09 LAB
ANION GAP SERPL CALC-SCNC: 8 MMOL/L
BUN SERPL-MCNC: 14 MG/DL (ref 8–25)
CALCIUM SERPL-MCNC: 8.4 MG/DL (ref 8.5–10.4)
CHLORIDE SERPL-SCNC: 109 MMOL/L (ref 97–107)
CO2 SERPL-SCNC: 24 MMOL/L (ref 24–31)
CREAT SERPL-MCNC: 1.2 MG/DL (ref 0.4–1.6)
EGFRCR SERPLBLD CKD-EPI 2021: 60 ML/MIN/1.73M*2
GLUCOSE SERPL-MCNC: 90 MG/DL (ref 65–99)
POTASSIUM SERPL-SCNC: 3.9 MMOL/L (ref 3.4–5.1)
SODIUM SERPL-SCNC: 141 MMOL/L (ref 133–145)

## 2024-02-09 PROCEDURE — 97110 THERAPEUTIC EXERCISES: CPT | Mod: GO,CO

## 2024-02-09 PROCEDURE — 36415 COLL VENOUS BLD VENIPUNCTURE: CPT | Performed by: NURSE PRACTITIONER

## 2024-02-09 PROCEDURE — 97535 SELF CARE MNGMENT TRAINING: CPT | Mod: GO,CO

## 2024-02-09 PROCEDURE — 2500000004 HC RX 250 GENERAL PHARMACY W/ HCPCS (ALT 636 FOR OP/ED): Performed by: NURSE PRACTITIONER

## 2024-02-09 PROCEDURE — 82374 ASSAY BLOOD CARBON DIOXIDE: CPT | Performed by: NURSE PRACTITIONER

## 2024-02-09 PROCEDURE — 2500000001 HC RX 250 WO HCPCS SELF ADMINISTERED DRUGS (ALT 637 FOR MEDICARE OP): Performed by: FAMILY MEDICINE

## 2024-02-09 PROCEDURE — 2500000004 HC RX 250 GENERAL PHARMACY W/ HCPCS (ALT 636 FOR OP/ED): Performed by: FAMILY MEDICINE

## 2024-02-09 PROCEDURE — 2500000001 HC RX 250 WO HCPCS SELF ADMINISTERED DRUGS (ALT 637 FOR MEDICARE OP): Performed by: NURSE PRACTITIONER

## 2024-02-09 RX ADMIN — VENLAFAXINE HYDROCHLORIDE 50 MG: 50 TABLET ORAL at 06:21

## 2024-02-09 RX ADMIN — HEPARIN SODIUM 5000 UNITS: 5000 INJECTION, SOLUTION INTRAVENOUS; SUBCUTANEOUS at 09:30

## 2024-02-09 RX ADMIN — LORATADINE 10 MG: 10 TABLET ORAL at 08:20

## 2024-02-09 RX ADMIN — HYDROXYZINE HYDROCHLORIDE 25 MG: 25 TABLET, FILM COATED ORAL at 15:16

## 2024-02-09 RX ADMIN — GABAPENTIN 100 MG: 100 CAPSULE ORAL at 08:20

## 2024-02-09 RX ADMIN — AZELASTINE HYDROCHLORIDE 1 SPRAY: 137 SPRAY, METERED NASAL at 08:22

## 2024-02-09 RX ADMIN — MEMANTINE 2.5 MG: 5 TABLET ORAL at 08:20

## 2024-02-09 RX ADMIN — AMLODIPINE BESYLATE 10 MG: 10 TABLET ORAL at 08:20

## 2024-02-09 RX ADMIN — TAMSULOSIN HYDROCHLORIDE 0.4 MG: 0.4 CAPSULE ORAL at 08:20

## 2024-02-09 RX ADMIN — GABAPENTIN 100 MG: 100 CAPSULE ORAL at 14:37

## 2024-02-09 RX ADMIN — BUDESONIDE 3 MG: 3 CAPSULE, GELATIN COATED ORAL at 08:20

## 2024-02-09 RX ADMIN — HYDROXYZINE HYDROCHLORIDE 25 MG: 25 TABLET, FILM COATED ORAL at 08:20

## 2024-02-09 RX ADMIN — NYSTATIN 1 APPLICATION: 100000 POWDER TOPICAL at 08:22

## 2024-02-09 ASSESSMENT — COGNITIVE AND FUNCTIONAL STATUS - GENERAL
TOILETING: A LITTLE
DRESSING REGULAR LOWER BODY CLOTHING: A LITTLE
DAILY ACTIVITIY SCORE: 19
DRESSING REGULAR LOWER BODY CLOTHING: A LITTLE
HELP NEEDED FOR BATHING: A LITTLE
DRESSING REGULAR UPPER BODY CLOTHING: A LITTLE
MOVING FROM LYING ON BACK TO SITTING ON SIDE OF FLAT BED WITH BEDRAILS: A LITTLE
MOBILITY SCORE: 18
MOVING TO AND FROM BED TO CHAIR: A LITTLE
HELP NEEDED FOR BATHING: A LITTLE
MOBILITY SCORE: 18
STANDING UP FROM CHAIR USING ARMS: A LITTLE
DAILY ACTIVITIY SCORE: 20
PERSONAL GROOMING: A LITTLE
WALKING IN HOSPITAL ROOM: A LITTLE
TOILETING: A LITTLE
MOVING FROM LYING ON BACK TO SITTING ON SIDE OF FLAT BED WITH BEDRAILS: A LITTLE
TURNING FROM BACK TO SIDE WHILE IN FLAT BAD: A LITTLE
WALKING IN HOSPITAL ROOM: A LITTLE
TURNING FROM BACK TO SIDE WHILE IN FLAT BAD: A LITTLE
MOVING TO AND FROM BED TO CHAIR: A LITTLE
CLIMB 3 TO 5 STEPS WITH RAILING: A LITTLE
CLIMB 3 TO 5 STEPS WITH RAILING: A LITTLE
STANDING UP FROM CHAIR USING ARMS: A LITTLE
DRESSING REGULAR UPPER BODY CLOTHING: A LITTLE

## 2024-02-09 ASSESSMENT — PAIN - FUNCTIONAL ASSESSMENT
PAIN_FUNCTIONAL_ASSESSMENT: 0-10
PAIN_FUNCTIONAL_ASSESSMENT: 0-10

## 2024-02-09 ASSESSMENT — PAIN SCALES - GENERAL
PAINLEVEL_OUTOF10: 0 - NO PAIN
PAINLEVEL_OUTOF10: 0 - NO PAIN
PAINLEVEL_OUTOF10: 5 - MODERATE PAIN

## 2024-02-09 ASSESSMENT — PAIN SCALES - WONG BAKER: WONGBAKER_NUMERICALRESPONSE: NO HURT

## 2024-02-09 ASSESSMENT — ACTIVITIES OF DAILY LIVING (ADL): BATHING_LEVEL_OF_ASSISTANCE: SETUP

## 2024-02-09 NOTE — DISCHARGE SUMMARY
Discharge Diagnosis  Acute kidney injury (CMS/HCC)    Issues Requiring Follow-Up  As above.     Discharge Meds     Your medication list        CONTINUE taking these medications        Instructions Last Dose Given Next Dose Due   Allergy Relief (loratadine) 10 mg tablet  Generic drug: loratadine           amLODIPine 5 mg tablet  Commonly known as: Norvasc      Take 1 tablet (5 mg) by mouth once daily.       azelastine 137 mcg (0.1 %) nasal spray  Commonly known as: Astelin      Administer 1 spray into each nostril 2 times a day.       azelastine-fluticasone 137-50 mcg/spray nasal spray  Commonly known as: Dymista           budesonide EC 3 mg 24 hr capsule  Commonly known as: Entocort EC      Take 1 capsule (3 mg) by mouth once daily.       cholecalciferol 125 MCG (5000 UT) capsule  Commonly known as: Vitamin D-3           gabapentin 300 mg capsule  Commonly known as: Neurontin      Take 1 capsule (300 mg) by mouth 3 times a day.       melatonin 3 mg tablet           memantine 28 mg capsule,sprinkle,ER 24hr  Commonly known as: Namenda           polyvinyl alcohol 1.4 % ophthalmic solution  Commonly known as: Liquifilm Tears           simvastatin 20 mg tablet  Commonly known as: Zocor           tamsulosin 0.4 mg 24 hr capsule  Commonly known as: Flomax           traZODone 100 mg tablet  Commonly known as: Desyrel      Take 1 tablet (100 mg) by mouth once daily at bedtime.       venlafaxine 50 mg tablet  Commonly known as: Effexor                  STOP taking these medications      furosemide 20 mg tablet  Commonly known as: Lasix        ibuprofen 400 mg tablet        lisinopril 20 mg tablet                 Test Results Pending At Discharge  Pending Labs       No current pending labs.            Hospital Course   Admitted for further management. Seen by Nephrology. Found to have significant constipation resulting in urinary retention. Cabrera was placed and laxatives given. Creatinine improved. He had large BM's and cabrera  was removed. He has been voiding without issue. Held his lisinopril and lasix as his BP was so low on admission, these will continue to be held at discharge, continue only amlodipine for blood pressure. PT/OT recommends low intensity therapy. He is being arranged for [facility], PT/OT. He is active already with house calls. Labs and VS are stable. DC home today.     Case and plan was discussed with patient, he is agreeable.   Case and plan was also discussed with my collaborating physician.     Time spent 35 minutes.     Pertinent Physical Exam At Time of Discharge  Physical Exam    Patient seen and examined. Resting in bed. Voiding without issue. No events overnight. Anxiety improved.     General: Alert and oriented x3, pleasant.   Cardiac: Regular rate and rhythm, S1/S2 , no murmur.   Pulmonary: Clear to auscultation on room air.   Abdomen: Soft, round, nontender. BS +x4.   Extremities: No edema.  Skin: No rashes or lesions.      Outpatient Follow-Up    Follow up with your PCP in 1-2 weeks.     SERGIO Da Silva-CNP

## 2024-02-09 NOTE — PROGRESS NOTES
Physical Therapy    Physical Therapy Treatment    Patient Name: Demarco Gudino  MRN: [id number]  Today's Date: 2/9/2024  Time Calculation  Start Time: 0917  Stop Time: 0948  Time Calculation (min): 31 min       Assessment/Plan   PT Assessment  PT Assessment Results: Decreased strength, Decreased endurance, Impaired balance, Decreased mobility, Decreased safety awareness, Impaired judgement  Rehab Prognosis: Good  Evaluation/Treatment Tolerance: Patient limited by fatigue  Medical Staff Made Aware: Yes  End of Session Patient Position: Bed, 3 rail up, Alarm on  PT Plan  Inpatient/Swing Bed or Outpatient: Inpatient  PT Plan  Treatment/Interventions: Bed mobility, Transfer training, Gait training, Therapeutic exercise  PT Plan: Skilled PT  PT Frequency: 4 times per week  PT Discharge Recommendations: Low intensity level of continued care, 24 hr supervision due to cognition  Equipment Recommended upon Discharge: Wheeled walker  PT Recommended Transfer Status: Assist x1, Assistive device  PT - OK to Discharge: Yes      General Visit Information:   PT  Visit  PT Received On: 02/09/24  General  Prior to Session Communication: Bedside nurse  Patient Position Received: Up in chair, Alarm on  Preferred Learning Style: verbal  General Comment: Pt. agreeable to treatment    Subjective   Precautions:  Precautions  Hearing/Visual Limitations: glasses  Medical Precautions: Fall precautions  Vital Signs:       Objective   Pain:  Pain Assessment  Pain Assessment: 0-10  Pain Score: 5 - Moderate pain  Pain Type: Chronic pain  Pain Location: Shoulder  Pain Orientation: Left  Cognition:  Cognition  Overall Cognitive Status: Within Functional Limits  Postural Control:     Extremity/Trunk Assessments:    Activity Tolerance:     Treatments:  Therapeutic Exercise  Therapeutic Exercise Performed: Yes  Therapeutic Exercise Activity 1: Pt performed seated bilat LE ankle pumps/heel raises, LAQ, hip flexion, chiki hip adduction, resisted hip  abduction x20 reps each. Verbal cues to perform slowly. Pt required rest breaks throughout due to fatigue.    Bed Mobility  Bed Mobility: Yes  Bed Mobility 1  Bed Mobility 1: Sitting to supine  Level of Assistance 1: Close supervision    Ambulation/Gait Training  Ambulation/Gait Training Performed: Yes  Ambulation/Gait Training 1  Surface 1: Level tile  Device 1: Rolling walker  Assistance 1: Contact guard  Quality of Gait 1: Decreased step length, Forward flexed posture  Comments/Distance (ft) 1: Pt ambulated with RW ~70' x2 CGA. Pt demonstrated slow, reciprocal gait. Slightly forward flexed posture, decreased bilat step length.  Transfers  Transfer: Yes  Transfer 1  Transfer From 1: Chair with arms to  Transfer to 1: Stand  Technique 1: Sit to stand  Transfer Level of Assistance 1: Minimum assistance  Transfers 2  Transfer From 2: Stand to  Transfer to 2: Sit, Bed  Technique 2: Stand to sit  Transfer Level of Assistance 2: Close supervision    Outcome Measures:  [facility] Basic Mobility  Turning from your back to your side while in a flat bed without using bedrails: A little  Moving from lying on your back to sitting on the side of a flat bed without using bedrails: A little  Moving to and from bed to chair (including a wheelchair): A little  Standing up from a chair using your arms (e.g. wheelchair or bedside chair): A little  To walk in hospital room: A little  Climbing 3-5 steps with railing: A little  Basic Mobility - Total Score: 18    Education Documentation  Handouts, taught by Arti Spears PTA at 2/9/2024  9:56 AM.  Learner: Patient  Readiness: Acceptance  Method: Explanation  Response: Verbalizes Understanding, Needs Reinforcement    Precautions, taught by Arti Spears PTA at 2/9/2024  9:56 AM.  Learner: Patient  Readiness: Acceptance  Method: Explanation  Response: Verbalizes Understanding, Needs Reinforcement    Body Mechanics, taught by Arti Spears PTA at 2/9/2024  9:56 AM.  Learner:  Patient  Readiness: Acceptance  Method: Explanation  Response: Verbalizes Understanding, Needs Reinforcement    Home Exercise Program, taught by Arti Spears PTA at 2/9/2024  9:56 AM.  Learner: Patient  Readiness: Acceptance  Method: Explanation  Response: Verbalizes Understanding, Needs Reinforcement    Mobility Training, taught by Arti Spears PTA at 2/9/2024  9:56 AM.  Learner: Patient  Readiness: Acceptance  Method: Explanation  Response: Verbalizes Understanding, Needs Reinforcement    Education Comments  No comments found.        OP EDUCATION:       Encounter Problems       Encounter Problems (Active)       Balance       dynamic (Progressing)       Start:  02/06/24    Expected End:  02/20/24       Pt will perform Tinetti assessment and score >/= 24/28, resulting in a low falls risk             Mobility       LTG - Patient will navigate 4-6 steps with rails/device (Progressing)       Start:  02/06/24    Expected End:  02/20/24            ambulation (Progressing)       Start:  02/06/24    Expected End:  02/20/24       Pt will amb >200 ft with wheeled walker and mod independence          endurance (Progressing)       Start:  02/06/24    Expected End:  02/20/24       Pt will tolerate 6MWT without SOB and with ANI rating 12-16            Safety       LTG - Patient will utilize safety techniques (Progressing)       Start:  02/06/24    Expected End:  02/20/24               Transfers       sit to stand (Progressing)       Start:  02/06/24    Expected End:  02/20/24       Pt will perform sit to stand transfer with LRAD and mod independence.

## 2024-02-09 NOTE — PROGRESS NOTES
Demarco Gudino is a 83 y.o. male on day 1 of admission presenting with Acute kidney injury     Per notes,  has been voiding without issue. Held his lisinopril and lasix as his BP was so low on admission, these will continue to be held at discharge, continue only amlodipine for blood pressure. PT/OT recommends low intensity therapy. He is being arranged for [facility], PT/OT. [facility] accepted patient. He is active already with house calls.     PLAN: Discharge to home with [facility]/PT and is active with [facility].     Lesa Marie RN

## 2024-02-09 NOTE — PROGRESS NOTES
Occupational Therapy    OT Treatment    Patient Name: Demarco Gudino  MRN: [id number]  Today's Date: 2/9/2024  Time Calculation  Start Time: 0748  Stop Time: 0829  Time Calculation (min): 41 min       Assessment:  End of Session Patient Position: Up in chair, Alarm on  OT Assessment Results: Decreased ADL status, Decreased endurance, Decreased functional mobility  Plan:  Treatment Interventions: ADL retraining, UE strengthening/ROM  OT Frequency: 3 times per week  OT Discharge Recommendations: Low intensity level of continued care, 24 hr supervision due to cognition  Equipment Recommended upon Discharge: Wheelchair  OT - OK to Discharge: Yes  Treatment Interventions: ADL retraining, UE strengthening/ROM    Subjective   Previous Visit Info:  OT Last Visit  OT Received On: 02/09/24  General:  General  Reason for Referral: decrease ADL's  Referred By: Dr. Herrmann  Past Medical History Relevant to Rehab: 82 y/o male who presented with generalized weakness. ANALILIA.  PMH including HTN, HLD, AAA, DJD, OA, depression  General Comment: Pt. agreeable to treatment  Precautions:  Hearing/Visual Limitations: glasses  Medical Precautions: Fall precautions    Pain:  Pain Assessment  Pain Assessment: 0-10  Pain Score: 0 - No pain    Objective       Activities of Daily Living: Feeding  Feeding Level of Assistance: Setup  Feeding Where Assessed: Chair    Grooming  Grooming Level of Assistance: Setup  Grooming Where Assessed: Bed level  Grooming Comments:  (Pt. washed face)    UE Bathing  UE Bathing Comments:  (declines)    LE Bathing  LE Bathing Level of Assistance: Setup  LE Bathing Where Assessed:  (chair with arms)  LE Bathing Comments:  (agrees to wash feet)    LE Dressing  LE Dressing Adaptive Equipment: Sock aide  Sock Level of Assistance: Minimum assistance  LE Dressing Where Assessed: Chair  LE Dressing Comments:  (Pt. don/ doff socks, therapist assist on Lt. LE which appeared a bit swollen)     Bed Mobility/Transfers:    Transfer  1  Transfer From 1: Bed to  Transfer to 1: Chair with arms  Technique 1:  (ambulates)  Transfer Level of Assistance 1: Close supervision  Trials/Comments 1:  (~3 steps with no LOB)     Therapy/Activity: Therapeutic Exercise  Therapeutic Exercise Activity 1:  (Bicep curls)  Therapeutic Exercise Activity 2: Shoulder flexion  Therapeutic Exercise Activity 3: shoulder internal/external rotation  Therapeutic Exercise Activity 4:  (Pt. sompletes 1 set x 12-20 reps of AROM in Bicep curls and shoulder internal/external rotation. Light resistance for shoulder flexion. Limited due to shoulder moderate pain during activity.)    Outcome Measures:[facility] Daily Activity  Putting on and taking off regular lower body clothing: A little  Bathing (including washing, rinsing, drying): A little  Putting on and taking off regular upper body clothing: A little  Toileting, which includes using toilet, bedpan or urinal: A little  Taking care of personal grooming such as brushing teeth: A little  Eating Meals: None  Daily Activity - Total Score: 19    Education Documentation  Home Exercise Program, taught by LIA Romano at 2/9/2024 10:32 AM.  Learner: Patient  Readiness: Acceptance  Method: Demonstration, Explanation  Response: Verbalizes Understanding, Demonstrated Understanding, Needs Reinforcement    ADL Training, taught by LIA Romano at 2/9/2024 10:32 AM.  Learner: Patient  Readiness: Acceptance  Method: Demonstration, Explanation  Response: Verbalizes Understanding, Demonstrated Understanding, Needs Reinforcement    Home Exercise Program, taught by LIA Romano at 2/9/2024 10:31 AM.  Learner: Patient  Readiness: Acceptance  Method: Explanation, Demonstration  Response: Verbalizes Understanding, Demonstrated Understanding, Needs Reinforcement    Education Comments  No comments found.    OP EDUCATION:  Education  Individual(s) Educated: Patient  Education Comment:  (Therapist educates Pt. on sockaid  purchase rersource and recommended wider sockaid)    Goals:  Encounter Problems       Encounter Problems (Active)       OT Goals       ADL's (Progressing)       Start:  02/06/24    Expected End:  02/20/24       Pt will complete ADL tasks w/ close supervision w/ AE/ compensatory tech for safety and increased independence in self care tasks.         Functional transfers (Progressing)       Start:  02/06/24    Expected End:  02/20/24       Pt will demon. Bed, chair and toilet transfers w/ mod I w/ LRD for safety and increased independence in functional transfers.         Functional endurance  (Progressing)       Start:  02/06/24    Expected End:  02/20/24       Pt will tolerate 15 minutes of functional activity to improve endurance for self care tasks and functional mobility.            Safety       LTG - Patient will utilize safety techniques (Progressing)       Start:  02/06/24    Expected End:  02/20/24               Transfers       sit to stand (Progressing)       Start:  02/06/24    Expected End:  02/20/24       Pt will perform sit to stand transfer with LRAD and mod independence.

## 2024-02-13 ENCOUNTER — TELEPHONE (OUTPATIENT)
Dept: PRIMARY CARE | Facility: CLINIC | Age: 84
End: 2024-02-13
Payer: MEDICAID

## 2024-02-13 NOTE — TELEPHONE ENCOUNTER
Patient discharged Friday 2/9 with order to discontinue Lasix and Lisinopril, continue Amlodipine for BP. Call placed to [facility] 2/12 and was informed they were waiting for provider's okay for start of care. Verbal order given for start of care. House Calls CM in home visit 2/12 to assist with medication management. Lisinopril and Lasix removed from one pill pack and taped to top of box per patient request.  Stated he will be able to remove the pills daily now that he sees which ones they are.   Call placed to [facility] on Aging per patient request to restart Meals on Wheels. SW will reach out to patient.  Will call [facility] to update on medication changes.

## 2024-02-13 NOTE — TELEPHONE ENCOUNTER
Thank you for the update. When I saw patient last, he was thinking about SNF/ LTC placement? Did he mention that at all?

## 2024-02-16 ENCOUNTER — TELEPHONE (OUTPATIENT)
Dept: PRIMARY CARE | Facility: CLINIC | Age: 84
End: 2024-02-16
Payer: MEDICAID

## 2024-02-19 ENCOUNTER — APPOINTMENT (OUTPATIENT)
Dept: RADIOLOGY | Facility: HOSPITAL | Age: 84
DRG: 690 | End: 2024-02-19
Payer: MEDICARE

## 2024-02-19 ENCOUNTER — APPOINTMENT (OUTPATIENT)
Dept: CARDIOLOGY | Facility: HOSPITAL | Age: 84
DRG: 690 | End: 2024-02-19
Payer: MEDICARE

## 2024-02-19 ENCOUNTER — OFFICE VISIT (OUTPATIENT)
Dept: PRIMARY CARE | Facility: CLINIC | Age: 84
DRG: 690 | End: 2024-02-19
Payer: MEDICARE

## 2024-02-19 ENCOUNTER — HOSPITAL ENCOUNTER (INPATIENT)
Facility: HOSPITAL | Age: 84
LOS: 3 days | Discharge: NURSING, REHAB OR LONG-TERM CARE | DRG: 690 | End: 2024-02-22
Attending: EMERGENCY MEDICINE | Admitting: INTERNAL MEDICINE
Payer: MEDICARE

## 2024-02-19 VITALS
HEART RATE: 85 BPM | OXYGEN SATURATION: 96 % | DIASTOLIC BLOOD PRESSURE: 80 MMHG | TEMPERATURE: 98 F | SYSTOLIC BLOOD PRESSURE: 130 MMHG

## 2024-02-19 DIAGNOSIS — R62.7 FAILURE TO THRIVE IN ADULT: ICD-10-CM

## 2024-02-19 DIAGNOSIS — F03.90 DEMENTIA, UNSPECIFIED DEMENTIA SEVERITY, UNSPECIFIED DEMENTIA TYPE, UNSPECIFIED WHETHER BEHAVIORAL, PSYCHOTIC, OR MOOD DISTURBANCE OR ANXIETY (MULTI): ICD-10-CM

## 2024-02-19 DIAGNOSIS — R53.1 WEAKNESS: Primary | ICD-10-CM

## 2024-02-19 DIAGNOSIS — M15.9 PRIMARY OSTEOARTHRITIS INVOLVING MULTIPLE JOINTS: ICD-10-CM

## 2024-02-19 DIAGNOSIS — N17.9 AKI (ACUTE KIDNEY INJURY) (CMS-HCC): Primary | ICD-10-CM

## 2024-02-19 DIAGNOSIS — G40.909 SEIZURE DISORDER (MULTI): ICD-10-CM

## 2024-02-19 DIAGNOSIS — K59.00 CONSTIPATION, UNSPECIFIED CONSTIPATION TYPE: ICD-10-CM

## 2024-02-19 DIAGNOSIS — J31.0 CHRONIC RHINITIS: Primary | ICD-10-CM

## 2024-02-19 DIAGNOSIS — N39.0 URINARY TRACT INFECTION WITHOUT HEMATURIA, SITE UNSPECIFIED: ICD-10-CM

## 2024-02-19 LAB
ALBUMIN SERPL-MCNC: 3.1 G/DL (ref 3.5–5)
ALP BLD-CCNC: 88 U/L (ref 35–125)
ALT SERPL-CCNC: 12 U/L (ref 5–40)
ANION GAP SERPL CALC-SCNC: 17 MMOL/L
APPEARANCE UR: CLEAR
AST SERPL-CCNC: 18 U/L (ref 5–40)
BACTERIA #/AREA URNS AUTO: ABNORMAL /HPF
BASOPHILS # BLD AUTO: 0.02 X10*3/UL (ref 0–0.1)
BASOPHILS NFR BLD AUTO: 0.5 %
BILIRUB SERPL-MCNC: 0.5 MG/DL (ref 0.1–1.2)
BILIRUB UR STRIP.AUTO-MCNC: NEGATIVE MG/DL
BUN SERPL-MCNC: 15 MG/DL (ref 8–25)
CALCIUM SERPL-MCNC: 8.8 MG/DL (ref 8.5–10.4)
CHLORIDE SERPL-SCNC: 99 MMOL/L (ref 97–107)
CO2 SERPL-SCNC: 18 MMOL/L (ref 24–31)
COLOR UR: YELLOW
CREAT SERPL-MCNC: 1 MG/DL (ref 0.4–1.6)
CRP SERPL-MCNC: 6.9 MG/DL (ref 0–2)
EGFRCR SERPLBLD CKD-EPI 2021: 75 ML/MIN/1.73M*2
EOSINOPHIL # BLD AUTO: 0.01 X10*3/UL (ref 0–0.4)
EOSINOPHIL NFR BLD AUTO: 0.2 %
ERYTHROCYTE [DISTWIDTH] IN BLOOD BY AUTOMATED COUNT: 12.2 % (ref 11.5–14.5)
ERYTHROCYTE [SEDIMENTATION RATE] IN BLOOD BY WESTERGREN METHOD: 78 MM/H (ref 0–20)
FLUAV RNA RESP QL NAA+PROBE: NOT DETECTED
FLUBV RNA RESP QL NAA+PROBE: NOT DETECTED
GLUCOSE SERPL-MCNC: 80 MG/DL (ref 65–99)
GLUCOSE UR STRIP.AUTO-MCNC: NORMAL MG/DL
HCT VFR BLD AUTO: 33.8 % (ref 41–52)
HGB BLD-MCNC: 11.8 G/DL (ref 13.5–17.5)
IMM GRANULOCYTES # BLD AUTO: 0.04 X10*3/UL (ref 0–0.5)
IMM GRANULOCYTES NFR BLD AUTO: 0.9 % (ref 0–0.9)
KETONES UR STRIP.AUTO-MCNC: ABNORMAL MG/DL
LEUKOCYTE ESTERASE UR QL STRIP.AUTO: ABNORMAL
LYMPHOCYTES # BLD AUTO: 0.62 X10*3/UL (ref 0.8–3)
LYMPHOCYTES NFR BLD AUTO: 14 %
MCH RBC QN AUTO: 33.3 PG (ref 26–34)
MCHC RBC AUTO-ENTMCNC: 34.9 G/DL (ref 32–36)
MCV RBC AUTO: 96 FL (ref 80–100)
MONOCYTES # BLD AUTO: 0.41 X10*3/UL (ref 0.05–0.8)
MONOCYTES NFR BLD AUTO: 9.2 %
MUCOUS THREADS #/AREA URNS AUTO: ABNORMAL /LPF
NEUTROPHILS # BLD AUTO: 3.34 X10*3/UL (ref 1.6–5.5)
NEUTROPHILS NFR BLD AUTO: 75.2 %
NITRITE UR QL STRIP.AUTO: NEGATIVE
NRBC BLD-RTO: 0 /100 WBCS (ref 0–0)
PH UR STRIP.AUTO: 5.5 [PH]
PLATELET # BLD AUTO: 260 X10*3/UL (ref 150–450)
POTASSIUM SERPL-SCNC: 4.1 MMOL/L (ref 3.4–5.1)
PROT SERPL-MCNC: 6.5 G/DL (ref 5.9–7.9)
PROT UR STRIP.AUTO-MCNC: ABNORMAL MG/DL
RBC # BLD AUTO: 3.54 X10*6/UL (ref 4.5–5.9)
RBC # UR STRIP.AUTO: NEGATIVE /UL
RBC #/AREA URNS AUTO: ABNORMAL /HPF
SARS-COV-2 RNA RESP QL NAA+PROBE: NOT DETECTED
SODIUM SERPL-SCNC: 134 MMOL/L (ref 133–145)
SP GR UR STRIP.AUTO: 1.02
URATE SERPL-MCNC: 4.7 MG/DL (ref 3.6–7.7)
UROBILINOGEN UR STRIP.AUTO-MCNC: ABNORMAL MG/DL
WBC # BLD AUTO: 4.4 X10*3/UL (ref 4.4–11.3)
WBC #/AREA URNS AUTO: ABNORMAL /HPF

## 2024-02-19 PROCEDURE — 86140 C-REACTIVE PROTEIN: CPT | Performed by: NURSE PRACTITIONER

## 2024-02-19 PROCEDURE — 93005 ELECTROCARDIOGRAM TRACING: CPT

## 2024-02-19 PROCEDURE — 2500000004 HC RX 250 GENERAL PHARMACY W/ HCPCS (ALT 636 FOR OP/ED): Performed by: NURSE PRACTITIONER

## 2024-02-19 PROCEDURE — 2500000004 HC RX 250 GENERAL PHARMACY W/ HCPCS (ALT 636 FOR OP/ED): Performed by: EMERGENCY MEDICINE

## 2024-02-19 PROCEDURE — 80053 COMPREHEN METABOLIC PANEL: CPT | Performed by: EMERGENCY MEDICINE

## 2024-02-19 PROCEDURE — 3079F DIAST BP 80-89 MM HG: CPT | Performed by: NURSE PRACTITIONER

## 2024-02-19 PROCEDURE — 73610 X-RAY EXAM OF ANKLE: CPT | Mod: RT

## 2024-02-19 PROCEDURE — 85025 COMPLETE CBC W/AUTO DIFF WBC: CPT | Performed by: EMERGENCY MEDICINE

## 2024-02-19 PROCEDURE — 87086 URINE CULTURE/COLONY COUNT: CPT | Mod: TRILAB | Performed by: EMERGENCY MEDICINE

## 2024-02-19 PROCEDURE — 1125F AMNT PAIN NOTED PAIN PRSNT: CPT | Performed by: NURSE PRACTITIONER

## 2024-02-19 PROCEDURE — 96374 THER/PROPH/DIAG INJ IV PUSH: CPT

## 2024-02-19 PROCEDURE — 87636 SARSCOV2 & INF A&B AMP PRB: CPT | Performed by: EMERGENCY MEDICINE

## 2024-02-19 PROCEDURE — 99285 EMERGENCY DEPT VISIT HI MDM: CPT | Mod: 25

## 2024-02-19 PROCEDURE — 84550 ASSAY OF BLOOD/URIC ACID: CPT | Performed by: NURSE PRACTITIONER

## 2024-02-19 PROCEDURE — 73110 X-RAY EXAM OF WRIST: CPT | Mod: RT

## 2024-02-19 PROCEDURE — 73030 X-RAY EXAM OF SHOULDER: CPT | Mod: LT

## 2024-02-19 PROCEDURE — 96361 HYDRATE IV INFUSION ADD-ON: CPT

## 2024-02-19 PROCEDURE — 81001 URINALYSIS AUTO W/SCOPE: CPT | Performed by: EMERGENCY MEDICINE

## 2024-02-19 PROCEDURE — 3075F SYST BP GE 130 - 139MM HG: CPT | Performed by: NURSE PRACTITIONER

## 2024-02-19 PROCEDURE — 36415 COLL VENOUS BLD VENIPUNCTURE: CPT | Performed by: EMERGENCY MEDICINE

## 2024-02-19 PROCEDURE — 1159F MED LIST DOCD IN RCRD: CPT | Performed by: NURSE PRACTITIONER

## 2024-02-19 PROCEDURE — 71046 X-RAY EXAM CHEST 2 VIEWS: CPT

## 2024-02-19 PROCEDURE — 2500000002 HC RX 250 W HCPCS SELF ADMINISTERED DRUGS (ALT 637 FOR MEDICARE OP, ALT 636 FOR OP/ED): Performed by: NURSE PRACTITIONER

## 2024-02-19 PROCEDURE — 2500000001 HC RX 250 WO HCPCS SELF ADMINISTERED DRUGS (ALT 637 FOR MEDICARE OP): Performed by: NURSE PRACTITIONER

## 2024-02-19 PROCEDURE — 85652 RBC SED RATE AUTOMATED: CPT | Performed by: NURSE PRACTITIONER

## 2024-02-19 PROCEDURE — 2500000004 HC RX 250 GENERAL PHARMACY W/ HCPCS (ALT 636 FOR OP/ED)

## 2024-02-19 PROCEDURE — 1111F DSCHRG MED/CURRENT MED MERGE: CPT | Performed by: NURSE PRACTITIONER

## 2024-02-19 PROCEDURE — 1100000001 HC PRIVATE ROOM DAILY

## 2024-02-19 PROCEDURE — 99348 HOME/RES VST EST LOW MDM 30: CPT | Performed by: NURSE PRACTITIONER

## 2024-02-19 RX ORDER — HYDROXYZINE PAMOATE 25 MG/1
25 CAPSULE ORAL EVERY 6 HOURS PRN
Status: DISCONTINUED | OUTPATIENT
Start: 2024-02-19 | End: 2024-02-22 | Stop reason: HOSPADM

## 2024-02-19 RX ORDER — BUDESONIDE 3 MG/1
3 CAPSULE, COATED PELLETS ORAL DAILY
Status: DISCONTINUED | OUTPATIENT
Start: 2024-02-19 | End: 2024-02-22 | Stop reason: HOSPADM

## 2024-02-19 RX ORDER — SODIUM CHLORIDE 9 MG/ML
100 INJECTION, SOLUTION INTRAVENOUS CONTINUOUS
Status: DISCONTINUED | OUTPATIENT
Start: 2024-02-19 | End: 2024-02-20

## 2024-02-19 RX ORDER — LORATADINE 10 MG/1
10 TABLET ORAL DAILY
Qty: 90 TABLET | Refills: 1 | Status: SHIPPED | OUTPATIENT
Start: 2024-02-19 | End: 2024-03-25 | Stop reason: SDUPTHER

## 2024-02-19 RX ORDER — VENLAFAXINE 50 MG/1
50 TABLET ORAL DAILY
Status: DISCONTINUED | OUTPATIENT
Start: 2024-02-20 | End: 2024-02-22 | Stop reason: HOSPADM

## 2024-02-19 RX ORDER — POLYVINYL ALCOHOL 14 MG/ML
2 SOLUTION/ DROPS OPHTHALMIC 3 TIMES DAILY PRN
Status: DISCONTINUED | OUTPATIENT
Start: 2024-02-19 | End: 2024-02-22 | Stop reason: HOSPADM

## 2024-02-19 RX ORDER — LORATADINE 10 MG/1
10 TABLET ORAL DAILY
Status: DISCONTINUED | OUTPATIENT
Start: 2024-02-19 | End: 2024-02-22 | Stop reason: HOSPADM

## 2024-02-19 RX ORDER — CEFTRIAXONE 1 G/50ML
1 INJECTION, SOLUTION INTRAVENOUS EVERY 24 HOURS
Status: DISCONTINUED | OUTPATIENT
Start: 2024-02-20 | End: 2024-02-22 | Stop reason: HOSPADM

## 2024-02-19 RX ORDER — TRAZODONE HYDROCHLORIDE 100 MG/1
100 TABLET ORAL NIGHTLY
Status: DISCONTINUED | OUTPATIENT
Start: 2024-02-19 | End: 2024-02-22 | Stop reason: HOSPADM

## 2024-02-19 RX ORDER — HEPARIN SODIUM 5000 [USP'U]/ML
5000 INJECTION, SOLUTION INTRAVENOUS; SUBCUTANEOUS EVERY 12 HOURS
Status: DISCONTINUED | OUTPATIENT
Start: 2024-02-19 | End: 2024-02-22 | Stop reason: HOSPADM

## 2024-02-19 RX ORDER — KETOROLAC TROMETHAMINE 30 MG/ML
15 INJECTION, SOLUTION INTRAMUSCULAR; INTRAVENOUS EVERY 6 HOURS PRN
Status: DISCONTINUED | OUTPATIENT
Start: 2024-02-19 | End: 2024-02-20

## 2024-02-19 RX ORDER — AZELASTINE 1 MG/ML
1 SPRAY, METERED NASAL 2 TIMES DAILY
Status: DISCONTINUED | OUTPATIENT
Start: 2024-02-19 | End: 2024-02-22 | Stop reason: HOSPADM

## 2024-02-19 RX ORDER — CEFTRIAXONE 1 G/50ML
1 INJECTION, SOLUTION INTRAVENOUS ONCE
Status: COMPLETED | OUTPATIENT
Start: 2024-02-19 | End: 2024-02-19

## 2024-02-19 RX ORDER — AMLODIPINE BESYLATE 5 MG/1
5 TABLET ORAL DAILY
Status: DISCONTINUED | OUTPATIENT
Start: 2024-02-19 | End: 2024-02-22 | Stop reason: HOSPADM

## 2024-02-19 RX ORDER — ACETAMINOPHEN 325 MG/1
650 TABLET ORAL EVERY 4 HOURS PRN
Status: DISCONTINUED | OUTPATIENT
Start: 2024-02-19 | End: 2024-02-22 | Stop reason: HOSPADM

## 2024-02-19 RX ORDER — POLYETHYLENE GLYCOL 3350 17 G/17G
17 POWDER, FOR SOLUTION ORAL DAILY
Status: DISCONTINUED | OUTPATIENT
Start: 2024-02-20 | End: 2024-02-22 | Stop reason: HOSPADM

## 2024-02-19 RX ORDER — GABAPENTIN 300 MG/1
300 CAPSULE ORAL 3 TIMES DAILY
Status: DISCONTINUED | OUTPATIENT
Start: 2024-02-19 | End: 2024-02-22 | Stop reason: HOSPADM

## 2024-02-19 RX ORDER — DOCUSATE SODIUM 100 MG/1
100 CAPSULE, LIQUID FILLED ORAL 2 TIMES DAILY
Status: DISCONTINUED | OUTPATIENT
Start: 2024-02-19 | End: 2024-02-22 | Stop reason: HOSPADM

## 2024-02-19 RX ORDER — TALC
3 POWDER (GRAM) TOPICAL NIGHTLY
Status: DISCONTINUED | OUTPATIENT
Start: 2024-02-19 | End: 2024-02-22 | Stop reason: HOSPADM

## 2024-02-19 RX ORDER — TAMSULOSIN HYDROCHLORIDE 0.4 MG/1
0.4 CAPSULE ORAL DAILY
Status: DISCONTINUED | OUTPATIENT
Start: 2024-02-19 | End: 2024-02-22 | Stop reason: HOSPADM

## 2024-02-19 RX ORDER — MEMANTINE HYDROCHLORIDE 5 MG/1
10 TABLET ORAL 2 TIMES DAILY
Status: DISCONTINUED | OUTPATIENT
Start: 2024-02-19 | End: 2024-02-22 | Stop reason: HOSPADM

## 2024-02-19 RX ADMIN — AZELASTINE HYDROCHLORIDE 1 SPRAY: 137 SPRAY, METERED NASAL at 20:14

## 2024-02-19 RX ADMIN — SODIUM CHLORIDE 100 ML/HR: 900 INJECTION, SOLUTION INTRAVENOUS at 19:16

## 2024-02-19 RX ADMIN — MEMANTINE 10 MG: 5 TABLET ORAL at 20:14

## 2024-02-19 RX ADMIN — TRAZODONE HYDROCHLORIDE 100 MG: 100 TABLET ORAL at 20:14

## 2024-02-19 RX ADMIN — ACETAMINOPHEN 650 MG: 325 TABLET ORAL at 19:18

## 2024-02-19 RX ADMIN — DOCUSATE SODIUM 100 MG: 100 CAPSULE, LIQUID FILLED ORAL at 20:14

## 2024-02-19 RX ADMIN — AMLODIPINE BESYLATE 5 MG: 5 TABLET ORAL at 19:17

## 2024-02-19 RX ADMIN — HEPARIN SODIUM 5000 UNITS: 5000 INJECTION, SOLUTION INTRAVENOUS; SUBCUTANEOUS at 20:14

## 2024-02-19 RX ADMIN — TAMSULOSIN HYDROCHLORIDE 0.4 MG: 0.4 CAPSULE ORAL at 19:17

## 2024-02-19 RX ADMIN — GABAPENTIN 300 MG: 300 CAPSULE ORAL at 20:14

## 2024-02-19 RX ADMIN — BUDESONIDE 3 MG: 3 CAPSULE, GELATIN COATED ORAL at 19:17

## 2024-02-19 RX ADMIN — LORATADINE 10 MG: 10 TABLET ORAL at 19:17

## 2024-02-19 RX ADMIN — Medication 3 MG: at 20:14

## 2024-02-19 RX ADMIN — CEFTRIAXONE SODIUM 1 G: 1 INJECTION, SOLUTION INTRAVENOUS at 16:50

## 2024-02-19 RX ADMIN — SODIUM CHLORIDE 1000 ML: 900 INJECTION, SOLUTION INTRAVENOUS at 13:11

## 2024-02-19 SDOH — ECONOMIC STABILITY: FOOD INSECURITY: WITHIN THE PAST 12 MONTHS, THE FOOD YOU BOUGHT JUST DIDN'T LAST AND YOU DIDN'T HAVE MONEY TO GET MORE.: NEVER TRUE

## 2024-02-19 SDOH — SOCIAL STABILITY: SOCIAL INSECURITY: WITHIN THE LAST YEAR, HAVE YOU BEEN HUMILIATED OR EMOTIONALLY ABUSED IN OTHER WAYS BY YOUR PARTNER OR EX-PARTNER?: NO

## 2024-02-19 SDOH — SOCIAL STABILITY: SOCIAL INSECURITY: ABUSE: ADULT

## 2024-02-19 SDOH — SOCIAL STABILITY: SOCIAL INSECURITY: WITHIN THE LAST YEAR, HAVE YOU BEEN AFRAID OF YOUR PARTNER OR EX-PARTNER?: NO

## 2024-02-19 SDOH — ECONOMIC STABILITY: INCOME INSECURITY: IN THE PAST 12 MONTHS, HAS THE ELECTRIC, GAS, OIL, OR WATER COMPANY THREATENED TO SHUT OFF SERVICE IN YOUR HOME?: NO

## 2024-02-19 SDOH — ECONOMIC STABILITY: FOOD INSECURITY: WITHIN THE PAST 12 MONTHS, YOU WORRIED THAT YOUR FOOD WOULD RUN OUT BEFORE YOU GOT MONEY TO BUY MORE.: NEVER TRUE

## 2024-02-19 SDOH — SOCIAL STABILITY: SOCIAL INSECURITY: ARE THERE ANY APPARENT SIGNS OF INJURIES/BEHAVIORS THAT COULD BE RELATED TO ABUSE/NEGLECT?: NO

## 2024-02-19 SDOH — SOCIAL STABILITY: SOCIAL INSECURITY: DOES ANYONE TRY TO KEEP YOU FROM HAVING/CONTACTING OTHER FRIENDS OR DOING THINGS OUTSIDE YOUR HOME?: NO

## 2024-02-19 SDOH — SOCIAL STABILITY: SOCIAL INSECURITY: DO YOU FEEL UNSAFE GOING BACK TO THE PLACE WHERE YOU ARE LIVING?: NO

## 2024-02-19 SDOH — SOCIAL STABILITY: SOCIAL INSECURITY: ARE YOU OR HAVE YOU BEEN THREATENED OR ABUSED PHYSICALLY, EMOTIONALLY, OR SEXUALLY BY ANYONE?: NO

## 2024-02-19 SDOH — ECONOMIC STABILITY: INCOME INSECURITY: IN THE LAST 12 MONTHS, WAS THERE A TIME WHEN YOU WERE NOT ABLE TO PAY THE MORTGAGE OR RENT ON TIME?: NO

## 2024-02-19 SDOH — SOCIAL STABILITY: SOCIAL INSECURITY: HAS ANYONE EVER THREATENED TO HURT YOUR FAMILY OR YOUR PETS?: NO

## 2024-02-19 SDOH — SOCIAL STABILITY: SOCIAL INSECURITY: DO YOU FEEL ANYONE HAS EXPLOITED OR TAKEN ADVANTAGE OF YOU FINANCIALLY OR OF YOUR PERSONAL PROPERTY?: NO

## 2024-02-19 SDOH — SOCIAL STABILITY: SOCIAL INSECURITY: HAVE YOU HAD THOUGHTS OF HARMING ANYONE ELSE?: NO

## 2024-02-19 ASSESSMENT — ENCOUNTER SYMPTOMS
SINUS PAIN: 0
CHILLS: 0
ABDOMINAL DISTENTION: 0
ADENOPATHY: 0
DIARRHEA: 0
RHINORRHEA: 1
FEVER: 0
SEIZURES: 0
WOUND: 0
HEADACHES: 0
ABDOMINAL PAIN: 0
JOINT SWELLING: 0
COUGH: 0
TROUBLE SWALLOWING: 0
DIFFICULTY URINATING: 0
SINUS PRESSURE: 0
DIZZINESS: 1
WHEEZING: 0
FATIGUE: 0
COLOR CHANGE: 0
BRUISES/BLEEDS EASILY: 0
NAUSEA: 0
SHORTNESS OF BREATH: 0
EYE PAIN: 0
PALPITATIONS: 0
APPETITE CHANGE: 1
MYALGIAS: 0
WEAKNESS: 1
CONSTIPATION: 0

## 2024-02-19 ASSESSMENT — LIFESTYLE VARIABLES
HOW OFTEN DO YOU HAVE A DRINK CONTAINING ALCOHOL: MONTHLY OR LESS
SUBSTANCE_ABUSE_PAST_12_MONTHS: NO
AUDIT-C TOTAL SCORE: 1
HOW OFTEN DO YOU HAVE 6 OR MORE DRINKS ON ONE OCCASION: NEVER
HOW MANY STANDARD DRINKS CONTAINING ALCOHOL DO YOU HAVE ON A TYPICAL DAY: 1 OR 2
AUDIT-C TOTAL SCORE: 1
PRESCIPTION_ABUSE_PAST_12_MONTHS: NO
SKIP TO QUESTIONS 9-10: 1

## 2024-02-19 ASSESSMENT — COGNITIVE AND FUNCTIONAL STATUS - GENERAL
TOILETING: A LITTLE
HELP NEEDED FOR BATHING: A LITTLE
PERSONAL GROOMING: A LITTLE
TURNING FROM BACK TO SIDE WHILE IN FLAT BAD: A LITTLE
PATIENT BASELINE BEDBOUND: NO
WALKING IN HOSPITAL ROOM: A LITTLE
STANDING UP FROM CHAIR USING ARMS: A LITTLE
DRESSING REGULAR UPPER BODY CLOTHING: A LITTLE
DAILY ACTIVITIY SCORE: 18
MOBILITY SCORE: 18
CLIMB 3 TO 5 STEPS WITH RAILING: A LITTLE
MOVING TO AND FROM BED TO CHAIR: A LITTLE
DRESSING REGULAR LOWER BODY CLOTHING: A LITTLE
MOVING FROM LYING ON BACK TO SITTING ON SIDE OF FLAT BED WITH BEDRAILS: A LITTLE
EATING MEALS: A LITTLE

## 2024-02-19 ASSESSMENT — ACTIVITIES OF DAILY LIVING (ADL)
HEARING - LEFT EAR: FUNCTIONAL
ASSISTIVE_DEVICE: WALKER
ADEQUATE_TO_COMPLETE_ADL: YES
DRESSING YOURSELF: NEEDS ASSISTANCE
LACK_OF_TRANSPORTATION: NO
FEEDING YOURSELF: NEEDS ASSISTANCE
TOILETING: NEEDS ASSISTANCE
WALKS IN HOME: NEEDS ASSISTANCE
GROOMING: NEEDS ASSISTANCE
HEARING - RIGHT EAR: FUNCTIONAL
BATHING: NEEDS ASSISTANCE
JUDGMENT_ADEQUATE_SAFELY_COMPLETE_DAILY_ACTIVITIES: YES
PATIENT'S MEMORY ADEQUATE TO SAFELY COMPLETE DAILY ACTIVITIES?: YES

## 2024-02-19 ASSESSMENT — PAIN - FUNCTIONAL ASSESSMENT
PAIN_FUNCTIONAL_ASSESSMENT: 0-10

## 2024-02-19 ASSESSMENT — PATIENT HEALTH QUESTIONNAIRE - PHQ9
1. LITTLE INTEREST OR PLEASURE IN DOING THINGS: NOT AT ALL
2. FEELING DOWN, DEPRESSED OR HOPELESS: NOT AT ALL
SUM OF ALL RESPONSES TO PHQ9 QUESTIONS 1 & 2: 0

## 2024-02-19 ASSESSMENT — PAIN SCALES - GENERAL
PAINLEVEL_OUTOF10: 7
PAINLEVEL_OUTOF10: 0 - NO PAIN
PAINLEVEL_OUTOF10: 0 - NO PAIN
PAINLEVEL: 5
PAINLEVEL_OUTOF10: 4

## 2024-02-19 ASSESSMENT — PAIN DESCRIPTION - LOCATION: LOCATION: BACK

## 2024-02-19 NOTE — PROGRESS NOTES
"   02/19/24 1508   Housing Stability   In the last 12 months, was there a time when you were not able to pay the mortgage or rent on time? N   In the last 12 months, was there a time when you did not have a steady place to sleep or slept in a shelter (including now)? N   Transportation Needs   In the past 12 months, has lack of transportation kept you from medical appointments or from getting medications? no   In the past 12 months, has lack of transportation kept you from meetings, work, or from getting things needed for daily living? No   Food Insecurity   Within the past 12 months, you worried that your food would run out before you got the money to buy more. Never true   Within the past 12 months, the food you bought just didn't last and you didn't have money to get more. Never true   Intimate Partner Violence   Within the last year, have you been afraid of your partner or ex-partner? No   Within the last year, have you been humiliated or emotionally abused in other ways by your partner or ex-partner? No   Within the last year, have you been kicked, hit, slapped, or otherwise physically hurt by your partner or ex-partner? No   Within the last year, have you been raped or forced to have any kind of sexual activity by your partner or ex-partner? No   Utilities   In the past 12 months has the electric, gas, oil, or water company threatened to shut off services in your home? No           Demarco Gudino is a 83 y.o. male on day 0 of admission presenting with No Principal Problem: There is no principal problem currently on the Problem List. Please update the Problem List and refresh..    Subjective          Objective     Physical Exam    Last Recorded Vitals  Blood pressure (!) 136/123, pulse 81, temperature 36.9 °C (98.4 °F), temperature source Oral, resp. rate 16, height 1.88 m (6' 2\"), weight 88.6 kg (195 lb 5.2 oz), SpO2 97 %.  Intake/Output last 3 Shifts:  No intake/output data recorded.    Relevant Results         "                     Assessment/Plan   Active Problems:  There are no active Hospital Problems.               JAYLENE Kaufman

## 2024-02-19 NOTE — ED PROVIDER NOTES
HPI   Chief Complaint   Patient presents with    Weakness, Gen     Pt getting generally weak over the past couple of days per his home care nurse.        HPI  Patient is an 83-year-old male with history of hypertension, hyperlipidemia presenting with generalized weakness that has worsened over the last few days.  Patient denies headache or dizziness, chest pain or shortness of breath, abdominal pain or nausea vomiting diarrhea, urinary symptoms.  Patient states he is unable to take care of himself at home and unable to feed himself.  Patient does have home health care.  Patient was recently hospitalized on 2/5 for acute kidney injury, was discharged on 2/9 in stable condition.  Patient states he has not taken any of his medications, eaten, or drink in the last 3 days.  Patient states he feels like he needs placement in nursing facility.  Patient lives at home alone currently.                  [place] Coma Scale Score: 15                     Patient History   Past Medical History:   Diagnosis Date    AAA (abdominal aortic aneurysm) (CMS/[facility])     BPH (benign prostatic hyperplasia)     Colitis     Dementia (CMS/[facility])     Depression     DJD (degenerative joint disease)     HTN (hypertension)     Hyperlipidemia     OA (osteoarthritis)     Suicidal ideations      Past Surgical History:   Procedure Laterality Date    ACHILLES TENDON SURGERY Right 12/2007    repair    MR HEAD ANGIO WO IV CONTRAST  10/21/2019    MR HEAD ANGIO WO IV CONTRAST [facility]    MR HEAD ANGIO WO IV CONTRAST  07/27/2023    MR HEAD ANGIO WO IV CONTRAST [facility]    MR NECK ANGIO WO IV CONTRAST  10/21/2019    MR NECK ANGIO WO IV CONTRAST [facility]    REVERSE TOTAL SHOULDER ARTHROPLASTY Left 05/2019    Dr. Edwards    ROTATOR CUFF REPAIR Right 11/2011    Dr. Edwards    TOTAL KNEE ARTHROPLASTY Bilateral     TRANSURETHRAL RESECTION OF PROSTATE  08/2018    GL TURP Dr. Lindsey    UMBILICAL HERNIA REPAIR       No family  history on file.  Social History     Tobacco Use    Smoking status: Former     Types: Cigarettes     Quit date:      Years since quittin.1    Smokeless tobacco: Not on file   Vaping Use    Vaping Use: Never used   Substance Use Topics    Alcohol use: Yes     Alcohol/week: 1.0 standard drink of alcohol     Types: 1 Cans of beer per week    Drug use: Never       Physical Exam   ED Triage Vitals [24 1304]   Temperature Heart Rate Respirations BP   36.9 °C (98.4 °F) 90 15 (!) 147/111      Pulse Ox Temp Source Heart Rate Source Patient Position   98 % Oral Monitor Lying      BP Location FiO2 (%)     Left arm --       Physical Exam  Vitals reviewed.   HENT:      Head: Normocephalic and atraumatic.      Mouth/Throat:      Mouth: Mucous membranes are dry.   Eyes:      Extraocular Movements: Extraocular movements intact.   Cardiovascular:      Rate and Rhythm: Normal rate and regular rhythm.   Pulmonary:      Effort: Pulmonary effort is normal.      Breath sounds: Normal breath sounds.   Abdominal:      Palpations: Abdomen is soft.      Tenderness: There is no abdominal tenderness.   Musculoskeletal:      Cervical back: Neck supple.      Right lower leg: Edema present.      Left lower leg: No edema.   Skin:     General: Skin is dry.      Coloration: Skin is pale.   Neurological:      Mental Status: He is alert and oriented to person, place, and time. Mental status is at baseline.   Psychiatric:         Mood and Affect: Mood normal.         Behavior: Behavior normal.         ED Course & [facility]   ED Course as of 24   Mon 2024   1313 EKG on my independent interpretation: Normal sinus rhythm 90 bpm, normal axis, normal intervals, no acute ST or T wave abnormalities [AN]      ED Course User Index  [AN] Jyoti Tellez MD         Diagnoses as of 24   Weakness       Medical Decision Making  Parts of this chart have been completed using voice recognition software. Please excuse any errors  of transcription.  My thought process and reason for plan has been formulated from the time that I saw the patient until the time of disposition and is not specific to one specific moment during their visit and furthermore my MDM encompasses this entire chart and not only this text box.      HPI: Detailed above.    Exam: A medically appropriate exam performed, outlined above, given the known history and presentation.    History obtained from: Patient, EMR    EKG:     Social Determinants of Health considered during this visit:     Medications given during visit:  Medications   loratadine (Claritin) tablet 10 mg (10 mg oral Given 2/19/24 1917)   amLODIPine (Norvasc) tablet 5 mg (5 mg oral Given 2/19/24 1917)   azelastine (Astelin) 137 mcg (0.1 %) nasal spray 1 spray (1 spray Each Nostril Given 2/19/24 2014)   budesonide EC (Entocort EC) 24 hr capsule 3 mg (3 mg oral Given 2/19/24 1917)   gabapentin (Neurontin) capsule 300 mg (300 mg oral Given 2/19/24 2014)   melatonin tablet 3 mg (3 mg oral Given 2/19/24 2014)   memantine (Namenda) tablet 10 mg (10 mg oral Given 2/19/24 2014)   polyvinyl alcohol (Liquifilm Tears) 1.4 % ophthalmic solution 2 drop (has no administration in time range)   tamsulosin (Flomax) 24 hr capsule 0.4 mg (0.4 mg oral Given 2/19/24 1917)   traZODone (Desyrel) tablet 100 mg (100 mg oral Given 2/19/24 2014)   venlafaxine (Effexor) tablet 50 mg (has no administration in time range)   heparin (porcine) injection 5,000 Units (5,000 Units subcutaneous Given 2/19/24 2014)   sodium chloride 0.9% infusion (100 mL/hr intravenous Rate/Dose Verify 2/19/24 2209)   acetaminophen (Tylenol) tablet 650 mg (650 mg oral Given 2/19/24 1918)   polyethylene glycol (Glycolax, Miralax) packet 17 g (has no administration in time range)   docusate sodium (Colace) capsule 100 mg (100 mg oral Given 2/19/24 2014)   cefTRIAXone (Rocephin) IVPB 1 g (has no administration in time range)   hydrOXYzine pamoate (Vistaril) capsule  25 mg (has no administration in time range)   ketorolac (Toradol) injection 15 mg (has no administration in time range)   sodium chloride 0.9 % bolus 1,000 mL (0 mL intravenous Stopped 2/19/24 1406)   cefTRIAXone (Rocephin) IVPB 1 g (0 g intravenous Stopped 2/19/24 1721)        Diagnostic/tests  Labs Reviewed   CBC WITH AUTO DIFFERENTIAL - Abnormal       Result Value    WBC 4.4      nRBC 0.0      RBC 3.54 (*)     Hemoglobin 11.8 (*)     Hematocrit 33.8 (*)     MCV 96      MCH 33.3      MCHC 34.9      RDW 12.2      Platelets 260      Neutrophils % 75.2      Immature Granulocytes %, Automated 0.9      Lymphocytes % 14.0      Monocytes % 9.2      Eosinophils % 0.2      Basophils % 0.5      Neutrophils Absolute 3.34      Immature Granulocytes Absolute, Automated 0.04      Lymphocytes Absolute 0.62 (*)     Monocytes Absolute 0.41      Eosinophils Absolute 0.01      Basophils Absolute 0.02     COMPREHENSIVE METABOLIC PANEL - Abnormal    Glucose 80      Sodium 134      Potassium 4.1      Chloride 99      Bicarbonate 18 (*)     Urea Nitrogen 15      Creatinine 1.00      eGFR 75      Calcium 8.8      Albumin 3.1 (*)     Alkaline Phosphatase 88      Total Protein 6.5      AST 18      Bilirubin, Total 0.5      ALT 12      Anion Gap 17     URINALYSIS WITH REFLEX CULTURE AND MICROSCOPIC - Abnormal    Color, Urine Yellow      Appearance, Urine Clear      Specific Gravity, Urine 1.023      pH, Urine 5.5      Protein, Urine 50 (1+) (*)     Glucose, Urine Normal      Blood, Urine NEGATIVE      Ketones, Urine 100 (3+) (*)     Bilirubin, Urine NEGATIVE      Urobilinogen, Urine 2 (1+) (*)     Nitrite, Urine NEGATIVE      Leukocyte Esterase, Urine 75 [place]tagracia/µL (*)    MICROSCOPIC ONLY, URINE - Abnormal    WBC, Urine 21-50 (*)     RBC, Urine 1-2      Bacteria, Urine 2+ (*)     Mucus, Urine FEW     SEDIMENTATION RATE, AUTOMATED - Abnormal    Sedimentation Rate 78 (*)    C-REACTIVE PROTEIN - Abnormal    C-Reactive Protein 6.90 (*)     SARS-COV-2 AND INFLUENZA A/B PCR - Normal    Flu A Result Not Detected      Flu B Result Not Detected      Coronavirus 2019, PCR Not Detected      Narrative:     This assay has received FDA Emergency Use Authorization (EUA) and  is only authorized for the duration of time that circumstances exist to justify the authorization of the emergency use of in vitro diagnostic tests for the detection of SARS-CoV-2 virus and/or diagnosis of COVID-19 infection under section 564(b)(1) of the Act, 21 U.S.C. 360bbb-3(b)(1). Testing for SARS-CoV-2 is only recommended for patients who meet current clinical and/or epidemiological criteria as defined by federal, state, or local public health directives. This assay is an in vitro diagnostic nucleic acid amplification test for the qualitative detection of SARS-CoV-2, Influenza A, and Influenza B from nasopharyngeal specimens and has been validated for use at [facility]. Negative results do not preclude COVID-19 infections or Influenza A/B infections, and should not be used as the sole basis for diagnosis, treatment, or other management decisions. If Influenza A/B and RSV PCR results are negative, testing for Parainfluenza virus, Adenovirus and Metapneumovirus is routinely performed for [facility] inpatients, and is available on other patients by placing an add-on request.    URIC ACID - Normal    Uric Acid 4.7     URINE CULTURE   URINALYSIS WITH REFLEX CULTURE AND MICROSCOPIC    Narrative:     The following orders were created for panel order Urinalysis with Reflex Culture and Microscopic.  Procedure                               Abnormality         Status                     ---------                               -----------         ------                     Urinalysis with Reflex C...[[id number]]  Abnormal            Final result               Extra Urine Gray Tube[[id number]]                                                          Please view results for these tests on the individual orders.   EXTRA URINE GRAY TUBE   CBC   COMPREHENSIVE METABOLIC PANEL      XR ankle right 3+ views   Final Result   Soft tissue swelling without fracture or subluxation.             Signed by: Lenny Wilson 2/19/2024 8:09 PM   Dictation workstation:   [id number]      XR shoulder left 2+ views   Final Result   The humeral component appears overly subluxed inferiorly, clinical   correlation is recommended. No evidence for an acute osseous   abnormality.        Signed by: Reji Santiago 2/19/2024 3:47 PM   Dictation workstation:   [id number]      XR chest 2 views   Final Result   No acute cardiopulmonary disease.        Signed by: Lenny Wilson 2/19/2024 3:39 PM   Dictation workstation:   [id number]      XR wrist right 3+ views   Final Result   1. Extensive radiocarpal osteoarthritis, probably posttraumatic, with   associated scapholunate separation and proximal migration of the   capitate.   2. Additional marked osteoarthritis of several metacarpophalangeal   joints as above.   3. No acute fracture seen.        Signed by: Lenny Wilson 2/19/2024 3:37 PM   Dictation workstation:   [id number]           Considerations/further MDM:  Patient is hemodynamically stable.  On exam patient has some swelling of right wrist, likely posttraumatic, limited range of motion in left shoulder, patient has chronic shoulder issues.  Chest x-ray shows no acute findings.  Lab workup is unremarkable with exception of urinalysis which shows patient has UTI, patient treated with 1 g of Rocephin in ER.  Patient states he is too weak to go home at this time, he feels that he requires placement in a skilled nursing facility, does not feel safe at home alone.  As patient has a failure to thrive I believe admission to the hospital and subsequent placement into skilled nursing facility would be best for this patient.  I spoke with Dr. Ng. We thoroughly discussed the history,  physical exam, laboratory and imaging studies, as well as, emergency department course. Based upon that discussion, we've decided to admit for further observation and evaluation of their weakness.      Procedure  Procedures     Clint Valdez PA-C  02/19/24 2004

## 2024-02-19 NOTE — PROGRESS NOTES
"Subjective   Patient ID: Demarco Gudino is a 83 y.o. male who presents for Post-acute hospital.    HPI   Patient seen up in recliner, once again uncomfortable appearing. He  he has not taken any medications, eaten or drank for the past 3 days. He recently was discharged home from the hospital with [facility] services in place after being treated for ANALILIA. Patient states that he is no longer able to take care of himself at home and feels like he needs to be placed in an ECF. He wife is currently a LTC resident at [place]. EMS called for acute evaluation prior to placement.     ANALILIA - Per hospital records, \"Admitted for further management. Seen by Nephrology. Found to have significant constipation resulting in urinary retention. Cabrera was placed and laxatives given. Creatinine improved. He had large BM's and cabrera was removed. He has been voiding without issue. Held his lisinopril and lasix as his BP was so low on admission, these will continue to be held at discharge, continue only amlodipine for blood pressure. PT/OT recommends low intensity therapy. He is being arranged for [facility], PT/OT. He is active already with house calls. Labs and VS are stable. DC home today\". Patient is unable to stay hydrated and only has a scant amount of urination over the past several days.    Depression/ Dementia/ Suicide attempt Hx - Stable. Patient denies any current SI and continues to follow routinely with [facility] for mental health services    AAA - Surgical repair was originally recommended in 09/2022. Patient has not yet followed with a vascular surgeon and has no intentions to at this time.    No current facility-administered medications for this visit.    Current Outpatient Medications:     Allergy Relief, loratadine, 10 mg tablet, Take 1 tablet (10 mg) by mouth once daily., Disp: , Rfl:     amLODIPine (Norvasc) 5 mg tablet, Take 1 tablet (5 mg) by mouth once daily., Disp: 30 tablet, Rfl: 11    azelastine (Astelin) 137 mcg (0.1 " %) nasal spray, Administer 1 spray into each nostril 2 times a day., Disp: 30 mL, Rfl: 11    azelastine-fluticasone (Dymista) 137-50 mcg/spray nasal spray, Administer 1 spray into each nostril 2 times a day., Disp: , Rfl:     budesonide EC (Entocort EC) 3 mg 24 hr capsule, Take 1 capsule (3 mg) by mouth once daily., Disp: 30 capsule, Rfl: 11    cholecalciferol (Vitamin D-3) 125 MCG (5000 UT) capsule, Take 1 capsule (125 mcg) by mouth once daily., Disp: , Rfl:     gabapentin (Neurontin) 300 mg capsule, Take 1 capsule (300 mg) by mouth 3 times a day., Disp: 90 capsule, Rfl: 5    melatonin 3 mg tablet, Take 1 tablet (3 mg) by mouth once daily at bedtime., Disp: , Rfl:     memantine (Namenda) 28 mg capsule,sprinkle,ER 24hr, Take 1 capsule (28 mg) by mouth once daily., Disp: , Rfl:     polyvinyl alcohol (Liquifilm Tears) 1.4 % ophthalmic solution, Administer 2 drops into both eyes 3 times a day as needed., Disp: , Rfl:     simvastatin (Zocor) 20 mg tablet, Take 1 tablet (20 mg) by mouth once daily., Disp: , Rfl:     tamsulosin (Flomax) 0.4 mg 24 hr capsule, Take 1 capsule (0.4 mg) by mouth once daily., Disp: , Rfl:     traZODone (Desyrel) 100 mg tablet, Take 1 tablet (100 mg) by mouth once daily at bedtime., Disp: 30 tablet, Rfl: 11    venlafaxine (Effexor) 50 mg tablet, Take 1 tablet (50 mg) by mouth once daily in the morning. Take before meals., Disp: , Rfl:     Facility-Administered Medications Ordered in Other Visits:     sodium chloride 0.9 % bolus 1,000 mL, 1,000 mL, intravenous, Once, Jyoti Tellez MD, Last Rate: 1,000 mL/hr at 02/19/24 1311, 1,000 mL at 02/19/24 1311     Review of Systems   Constitutional:  Positive for appetite change. Negative for chills, fatigue and fever.   HENT:  Positive for rhinorrhea. Negative for dental problem, sinus pressure, sinus pain and trouble swallowing.         Positive for chronic sinus issues   Eyes:  Negative for pain and visual disturbance.   Respiratory:  Negative for  cough, shortness of breath and wheezing.    Cardiovascular:  Negative for chest pain, palpitations and leg swelling.   Gastrointestinal:  Negative for abdominal distention, abdominal pain, constipation, diarrhea and nausea.   Endocrine: Negative for cold intolerance and heat intolerance.   Genitourinary:  Negative for difficulty urinating.   Musculoskeletal:  Negative for gait problem, joint swelling and myalgias.   Skin:  Negative for color change, pallor, rash and wound.   Allergic/Immunologic: Negative for environmental allergies and food allergies.   Neurological:  Positive for dizziness and weakness. Negative for seizures and headaches.        See HPI   Hematological:  Negative for adenopathy. Does not bruise/bleed easily.   Psychiatric/Behavioral:  Negative for behavioral problems.         Positive for depression and dementia history   All other systems reviewed and are negative.      Objective   /80   Pulse 85   Temp 36.7 °C (98 °F)   SpO2 96%     Physical Exam  Constitutional:       General: He is not in acute distress.     Appearance: He is ill-appearing. He is not toxic-appearing.   HENT:      Head: Normocephalic and atraumatic.      Nose: Nose normal.      Mouth/Throat:      Mouth: Mucous membranes are moist.      Pharynx: Oropharynx is clear.   Eyes:      Extraocular Movements: Extraocular movements intact.      Conjunctiva/sclera: Conjunctivae normal.      Pupils: Pupils are equal, round, and reactive to light.   Cardiovascular:      Rate and Rhythm: Normal rate and regular rhythm.      Pulses: Normal pulses.      Heart sounds: Normal heart sounds. No murmur heard.  Pulmonary:      Effort: Pulmonary effort is normal.      Breath sounds: Normal breath sounds. No wheezing.   Abdominal:      General: Abdomen is flat. Bowel sounds are normal.      Palpations: Abdomen is soft.   Musculoskeletal:         General: No swelling.      Cervical back: Neck supple.      Right lower leg: No edema.      Left  lower leg: No edema.      Comments: Ambulatory with assistive device   Skin:     General: Skin is warm and dry.   Neurological:      General: No focal deficit present.      Mental Status: He is alert.      Cranial Nerves: No cranial nerve deficit.      Motor: Weakness present.      Gait: Gait abnormal.      Comments: Positive for neuropathy bilateral hands and feet   Psychiatric:         Mood and Affect: Mood normal.         Behavior: Behavior normal.         Thought Content: Thought content normal.         Judgment: Judgment normal.         Assessment/Plan   Problem List Items Addressed This Visit             ICD-10-CM    Dementia (CMS/[facility]) F03.90    Osteoarthritis of multiple joints M15.9    Seizure disorder (CMS/[facility]) G40.909    ANALILIA (acute kidney injury) (CMS/[facility]) - Primary N17.9    Failure to thrive in adult R62.7        Patient states that he is no longer able to take care of himself at home and feels like he needs to be placed in an ECF. He wife is currently a LTC resident at [place]. EMS called for acute evaluation prior to placement. This provider staying with patient until he was taken by the [place] [facility]. Report called to [facility] ED    Alyssa Valadez, APRN-CNP

## 2024-02-19 NOTE — H&P
History Of Present Illness  Demarco Gudino is a 83 y.o. male presenting with weakness and failure to thrive. Patient was recently admitted for ANALILIA, weakness. He was found to have significant constipation with urinary retention, this was treated and his renal function improved. Carson was removed and he was voiding well. His lasix and lisinopril were stopped at that time. He went home with [facility] and house calls and has progressively gotten weaker. States over the past 3 days he has been unable to prepare food for himself or move around much. He denies any falls. He has not taken his medication for 3 days. He feels he is unable to manage at home alone. He denies any abdominal pain, nausea/vomiting, diarrhea, fevers, chest pain, SOB. He has chronic sinus issues and states his last BM was 3 days ago. He has not been urinating much but has not really eaten or drank anything for the past 3 days. UA suggests possible UTI.     ED: Ceftriaxone, IVF bolus.   He will be admitted for further work up and management of weakness, FTT, UTI.      Past Medical History  Past Medical History:   Diagnosis Date    AAA (abdominal aortic aneurysm) (CMS/HCC)     BPH (benign prostatic hyperplasia)     Colitis     Dementia (CMS/HCC)     Depression     DJD (degenerative joint disease)     HTN (hypertension)     Hyperlipidemia     OA (osteoarthritis)     Suicidal ideations        Surgical History  Past Surgical History:   Procedure Laterality Date    ACHILLES TENDON SURGERY Right 12/2007    repair    MR HEAD ANGIO WO IV CONTRAST  10/21/2019    MR HEAD ANGIO WO IV CONTRAST [facility]    MR HEAD ANGIO WO IV CONTRAST  07/27/2023    MR HEAD ANGIO WO IV CONTRAST [facility]    MR NECK ANGIO WO IV CONTRAST  10/21/2019    MR NECK ANGIO WO IV CONTRAST [facility]    REVERSE TOTAL SHOULDER ARTHROPLASTY Left 05/2019    Dr. Edwards    ROTATOR CUFF REPAIR Right 11/2011    Dr. Edwards    TOTAL KNEE ARTHROPLASTY Bilateral      "TRANSURETHRAL RESECTION OF PROSTATE  08/2018    GL TURP Dr. Lindsey    UMBILICAL HERNIA REPAIR          Social History  Home alone, active with house calls and C. His spouse lives in LTC facility. He uses a walker on occasion. Denies ETOH or tobacco use.     Family History  Reviewed and not pertinent to current medical issue.      Allergies  Clarithromycin, Hydromorphone, Tapentadol, and Adhesive    Review of Systems    Please see pertinent positives in the HPI and past medical and surgical histories.      Physical Exam    General: Pleasant, awake, alert. Frail appearing.   HEENT: PERRLA, no facial asymmetry noted. Normocephalic, dry mucous membranes.   Neck: No JVD, supple.  Cardiovascular: Regular rate and rhythm. Normal S1 and S2. No murmurs, rubs or gallops.   Respiratory: Clear to auscultation on room air.   Abdomen: Soft, round, non-tender to palpation. Bowel sounds present and normoactive.  Extremities: No peripheral cyanosis. Edema present in the R ankle and R wrist.   Neurologic: Alert and oriented to person, place and time. Normal sensation.   Dermatologic: No rash, ecchymosis, or jaundice.  Psychological: Appropriate affect and behavior.      Last Recorded Vitals  Blood pressure (!) 140/99, pulse 76, temperature 36.9 °C (98.4 °F), temperature source Oral, resp. rate 17, height 1.88 m (6' 2\"), weight 88.6 kg (195 lb 5.2 oz), SpO2 100 %.    Relevant Results    Scheduled medications  amLODIPine, 5 mg, oral, Daily  azelastine, 1 spray, Each Nostril, BID  budesonide EC, 3 mg, oral, Daily  cefTRIAXone, 1 g, intravenous, q24h  docusate sodium, 100 mg, oral, BID  gabapentin, 300 mg, oral, TID  heparin (porcine), 5,000 Units, subcutaneous, q12h  loratadine, 10 mg, oral, Daily  melatonin, 3 mg, oral, Nightly  memantine, 10 mg, oral, BID  polyethylene glycol, 17 g, oral, Daily  tamsulosin, 0.4 mg, oral, Daily  traZODone, 100 mg, oral, Nightly  [START ON 2/20/2024] venlafaxine, 50 mg, oral, Daily before " breakfast      Continuous medications  sodium chloride 0.9%, 100 mL/hr      PRN medications  PRN medications: acetaminophen, polyvinyl alcohol    Results for orders placed or performed during the hospital encounter of 02/19/24 (from the past 24 hour(s))   CBC and Auto Differential   Result Value Ref Range    WBC 4.4 4.4 - 11.3 x10*3/uL    nRBC 0.0 0.0 - 0.0 /100 WBCs    RBC 3.54 (L) 4.50 - 5.90 x10*6/uL    Hemoglobin 11.8 (L) 13.5 - 17.5 g/dL    Hematocrit 33.8 (L) 41.0 - 52.0 %    MCV 96 80 - 100 fL    MCH 33.3 26.0 - 34.0 pg    MCHC 34.9 32.0 - 36.0 g/dL    RDW 12.2 11.5 - 14.5 %    Platelets 260 150 - 450 x10*3/uL    Neutrophils % 75.2 40.0 - 80.0 %    Immature Granulocytes %, Automated 0.9 0.0 - 0.9 %    Lymphocytes % 14.0 13.0 - 44.0 %    Monocytes % 9.2 2.0 - 10.0 %    Eosinophils % 0.2 0.0 - 6.0 %    Basophils % 0.5 0.0 - 2.0 %    Neutrophils Absolute 3.34 1.60 - 5.50 x10*3/uL    Immature Granulocytes Absolute, Automated 0.04 0.00 - 0.50 x10*3/uL    Lymphocytes Absolute 0.62 (L) 0.80 - 3.00 x10*3/uL    Monocytes Absolute 0.41 0.05 - 0.80 x10*3/uL    Eosinophils Absolute 0.01 0.00 - 0.40 x10*3/uL    Basophils Absolute 0.02 0.00 - 0.10 x10*3/uL   Sars-CoV-2 and Influenza A/B PCR   Result Value Ref Range    Flu A Result Not Detected Not Detected    Flu B Result Not Detected Not Detected    Coronavirus 2019, PCR Not Detected Not Detected   ECG 12 lead   Result Value Ref Range    Ventricular Rate 90 BPM    Atrial Rate 90 BPM    TN Interval 184 ms    QRS Duration 80 ms    QT Interval 370 ms    QTC Calculation(Bazett) 452 ms    P Axis 81 degrees    R Axis 86 degrees    T Axis 80 degrees    QRS Count 15 beats    Q Onset 222 ms    P Onset 130 ms    P Offset 199 ms    T Offset 407 ms    QTC Fredericia 423 ms   Comprehensive metabolic panel   Result Value Ref Range    Glucose 80 65 - 99 mg/dL    Sodium 134 133 - 145 mmol/L    Potassium 4.1 3.4 - 5.1 mmol/L    Chloride 99 97 - 107 mmol/L    Bicarbonate 18 (L) 24 - 31  mmol/L    Urea Nitrogen 15 8 - 25 mg/dL    Creatinine 1.00 0.40 - 1.60 mg/dL    eGFR 75 >60 mL/min/1.73m*2    Calcium 8.8 8.5 - 10.4 mg/dL    Albumin 3.1 (L) 3.5 - 5.0 g/dL    Alkaline Phosphatase 88 35 - 125 U/L    Total Protein 6.5 5.9 - 7.9 g/dL    AST 18 5 - 40 U/L    Bilirubin, Total 0.5 0.1 - 1.2 mg/dL    ALT 12 5 - 40 U/L    Anion Gap 17 <=19 mmol/L   Urinalysis with Reflex Culture and Microscopic   Result Value Ref Range    Color, Urine Yellow Light-Yellow, Yellow, Dark-Yellow    Appearance, Urine Clear Clear    Specific Gravity, Urine 1.023 1.005 - 1.035    pH, Urine 5.5 5.0, 5.5, 6.0, 6.5, 7.0, 7.5, 8.0    Protein, Urine 50 (1+) (A) NEGATIVE, 10 (TRACE), 20 (TRACE) mg/dL    Glucose, Urine Normal Normal mg/dL    Blood, Urine NEGATIVE NEGATIVE    Ketones, Urine 100 (3+) (A) NEGATIVE mg/dL    Bilirubin, Urine NEGATIVE NEGATIVE    Urobilinogen, Urine 2 (1+) (A) Normal mg/dL    Nitrite, Urine NEGATIVE NEGATIVE    Leukocyte Esterase, Urine 75 Altagracia/µL (A) NEGATIVE   Microscopic Only, Urine   Result Value Ref Range    WBC, Urine 21-50 (A) 1-5, NONE /HPF    RBC, Urine 1-2 NONE, 1-2, 3-5 /HPF    Bacteria, Urine 2+ (A) NONE SEEN /HPF    Mucus, Urine FEW Reference range not established. /LPF        XR shoulder left 2+ views    Result Date: 2/19/2024  Interpreted By:  Reji Santiago, STUDY: XR SHOULDER LEFT 2+ VIEWS; 2/19/2024 2:52 pm   INDICATION: Signs/Symptoms:shoulder pain. Pain.   COMPARISON: 10/06/2021   ACCESSION NUMBER(S): [id number]   ORDERING CLINICIAN: CHARLES PRICE   TECHNIQUE: AP and Y-views of the left shoulder.   FINDINGS: Left total reverse shoulder prosthesis is present. The shoulder prosthesis appears subluxed without ryan dislocation although given the degree of inferior subluxation of the humeral component, clinical correlation is recommended. No evidence for acute fracture. Prosthesis appears intact. No acute osseous abnormality is seen.       The humeral component appears overly subluxed  inferiorly, clinical correlation is recommended. No evidence for an acute osseous abnormality.   Signed by: Reji Santiago 2/19/2024 3:47 PM Dictation workstation:   [id number]    XR chest 2 views    Result Date: 2/19/2024  Interpreted By:  Lenny Wilson, STUDY: XR CHEST 2 VIEWS; 2/19/2024 2:08 pm   INDICATION: Signs/Symptoms:weakness   COMPARISON: 02/05/2024.   ACCESSION NUMBER(S): [id number]   ORDERING CLINICIAN: CHARLES PRICE   TECHNIQUE: Number of films: Two-view radiographs of the chest were obtained.   FINDINGS: The cardiac silhouette appears normal. Calcifications involve the tortuous aorta. The lungs are clear. No pleural effusions are seen. Degenerative changes involve the spine and right shoulder; left shoulder arthroplasty is again in position.       No acute cardiopulmonary disease.   Signed by: Lenny Wilson 2/19/2024 3:39 PM Dictation workstation:   [id number]    XR wrist right 3+ views    Result Date: 2/19/2024  Interpreted By:  Lenny Wilson, STUDY: XR WRIST RIGHT 3+ VIEWS; ;  2/19/2024 2:08 pm   INDICATION: Signs/Symptoms:R wrist swelling.   COMPARISON: None.   ACCESSION NUMBER(S): [id number]   ORDERING CLINICIAN: SHASHI MARION   FINDINGS: There is no fracture. Marked degenerative changes of the 1st, 2nd, 3rd and 5th metacarpophalangeal joints are present, with joint space narrowing, subchondral bone sclerosis and laxity of the 1st metacarpophalangeal joint. Marked osteoarthritis involves the radiocarpal joint which appears to be posttraumatic, with associated scapholunate separation and proximal migration of the capitate. Degenerative changes also involve the 1st carpometacarpal joint. There is mild diffuse soft tissue swelling. Calcifications involve the triangular fibrocartilage.       1. 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 2. Additional marked osteoarthritis of several  metacarpophalangeal joints as above. 3. No acute fracture seen.   Signed by: Lenny Wilson 2/19/2024 3:37 PM Dictation workstation:   [id number]    ECG 12 lead    Result Date: 2/19/2024  Normal sinus rhythm Normal ECG When compared with ECG of 05-FEB-2024 14:55, No significant change was found         Assessment/Plan     Weakness / Failure to Thrive  -PT/OT evals, will likely need SNF placement.   -Attempted at home with house calls and HHC, still unable to manage.   -Denies any recent falls.   -Possibly worsened with the possible UTI, will monitor his response to treating.   -IVF hydration.     UTI  -UA with leuks, awaiting urine culture.   -IV ceftriaxone for now.   -Check post-void bladder scan.   -Bowel regimen to avoid constipation.     Dehydration  -IVF hydration.   -States he has not ate or drank much at all over the past 3 days.     Right Ankle and R Wrist Edema  -He denies any injury or falls. Mild pain in these joints.   -XR imaging of the wrist shows no acute osseous abnormalities.   -Check XR R ankle.   -Check ESR, CRP and Uric Acid.     HTN  -Continue Norvasc.   -BP a little high, he states he has not taken meds for 3 days, monitor.     HLD  -Holding statin for now with his weakness complaints.     DVT Risk  -Heparin subcutaneous.     Plan  Case and plan was discussed with the patient, he is agreeable.   Case and plan was also discussed with my collaborating physician, Dr. Ramon Ng.     CODE STATUS was discussed, his wishes are DNR CCA, DNI.        I spent 75 minutes in the professional and overall care of this patient.      Carmen Luther, SERGIO-CNP

## 2024-02-19 NOTE — PROGRESS NOTES
"   02/19/24 1512   Discharge Planning   Living Arrangements Alone   Support Systems Friends/neighbors   Type of Residence Private residence   Patient expects to be discharged to: [facility]         Demarco Gudino is a 83 y.o. male on day 0 of admission presenting with No Principal Problem: There is no principal problem currently on the Problem List. Please update the Problem List and refresh..    Subjective          Objective     Physical Exam    Last Recorded Vitals  Blood pressure (!) 136/123, pulse 81, temperature 36.9 °C (98.4 °F), temperature source Oral, resp. rate 16, height 1.88 m (6' 2\"), weight 88.6 kg (195 lb 5.2 oz), SpO2 97 %.  Intake/Output last 3 Shifts:  No intake/output data recorded.    Relevant Results                             Assessment/Plan   Active Problems:  There are no active Hospital Problems.               JAYLENE Kaufman      "

## 2024-02-19 NOTE — PROGRESS NOTES
"TCSW received message from [facility] RN Nelly stating that pt's provider sent pt to the ED due to pt not eating and drinking the past 3 days and pt is unable to care for himself at home.  Pt recently discharged from [place] with [facility].  Pt's PT score at the time was 18 low recommendation.  Pt was OBS status  - and  - pt was inpt status.    TCSW met FTF with pt.  TCSW introduced self and discussed role.  Pt confirmed the past 3 days he has not been able to eat or drink due to \"feeling too weak.\"  Pt reports his neighbors do his grocery shopping and states he utilizes a walker.  Pt states he is typically independent with his ADL's.  Pt reports that his wife has been residing at [facility] for th past 2 years after she fell.  Pt reports both of his children are .  Pt confirmed that he does want to be referred to [place] for rehab.  TCSW discussed with pt discharge planning process.  Pt denies having any questions or concerns.  Pt will need to be evaluated by PT/OT.  If medically admitted and meets criteria for SNF TC will submit referral to [place]  "

## 2024-02-20 LAB
ALBUMIN SERPL-MCNC: 2.7 G/DL (ref 3.5–5)
ALP BLD-CCNC: 76 U/L (ref 35–125)
ALT SERPL-CCNC: 10 U/L (ref 5–40)
ANION GAP SERPL CALC-SCNC: 9 MMOL/L
AST SERPL-CCNC: 13 U/L (ref 5–40)
BILIRUB SERPL-MCNC: 0.3 MG/DL (ref 0.1–1.2)
BUN SERPL-MCNC: 15 MG/DL (ref 8–25)
CALCIUM SERPL-MCNC: 8.6 MG/DL (ref 8.5–10.4)
CHLORIDE SERPL-SCNC: 107 MMOL/L (ref 97–107)
CO2 SERPL-SCNC: 21 MMOL/L (ref 24–31)
CREAT SERPL-MCNC: 1 MG/DL (ref 0.4–1.6)
EGFRCR SERPLBLD CKD-EPI 2021: 75 ML/MIN/1.73M*2
ERYTHROCYTE [DISTWIDTH] IN BLOOD BY AUTOMATED COUNT: 12.1 % (ref 11.5–14.5)
GLUCOSE SERPL-MCNC: 94 MG/DL (ref 65–99)
HCT VFR BLD AUTO: 30.5 % (ref 41–52)
HGB BLD-MCNC: 10.4 G/DL (ref 13.5–17.5)
MCH RBC QN AUTO: 32.9 PG (ref 26–34)
MCHC RBC AUTO-ENTMCNC: 34.1 G/DL (ref 32–36)
MCV RBC AUTO: 97 FL (ref 80–100)
NRBC BLD-RTO: 0 /100 WBCS (ref 0–0)
PLATELET # BLD AUTO: 212 X10*3/UL (ref 150–450)
POTASSIUM SERPL-SCNC: 3.7 MMOL/L (ref 3.4–5.1)
PROT SERPL-MCNC: 5.7 G/DL (ref 5.9–7.9)
RBC # BLD AUTO: 3.16 X10*6/UL (ref 4.5–5.9)
SODIUM SERPL-SCNC: 137 MMOL/L (ref 133–145)
WBC # BLD AUTO: 3.1 X10*3/UL (ref 4.4–11.3)

## 2024-02-20 PROCEDURE — 2500000001 HC RX 250 WO HCPCS SELF ADMINISTERED DRUGS (ALT 637 FOR MEDICARE OP): Performed by: NURSE PRACTITIONER

## 2024-02-20 PROCEDURE — 97161 PT EVAL LOW COMPLEX 20 MIN: CPT | Mod: GP

## 2024-02-20 PROCEDURE — 2500000004 HC RX 250 GENERAL PHARMACY W/ HCPCS (ALT 636 FOR OP/ED): Performed by: NURSE PRACTITIONER

## 2024-02-20 PROCEDURE — 2500000004 HC RX 250 GENERAL PHARMACY W/ HCPCS (ALT 636 FOR OP/ED): Performed by: INTERNAL MEDICINE

## 2024-02-20 PROCEDURE — 97165 OT EVAL LOW COMPLEX 30 MIN: CPT | Mod: GO

## 2024-02-20 PROCEDURE — 85027 COMPLETE CBC AUTOMATED: CPT | Performed by: NURSE PRACTITIONER

## 2024-02-20 PROCEDURE — 36415 COLL VENOUS BLD VENIPUNCTURE: CPT | Performed by: NURSE PRACTITIONER

## 2024-02-20 PROCEDURE — 2500000002 HC RX 250 W HCPCS SELF ADMINISTERED DRUGS (ALT 637 FOR MEDICARE OP, ALT 636 FOR OP/ED): Performed by: NURSE PRACTITIONER

## 2024-02-20 PROCEDURE — 1100000001 HC PRIVATE ROOM DAILY

## 2024-02-20 PROCEDURE — 97530 THERAPEUTIC ACTIVITIES: CPT | Mod: GP

## 2024-02-20 PROCEDURE — 80053 COMPREHEN METABOLIC PANEL: CPT | Performed by: NURSE PRACTITIONER

## 2024-02-20 RX ADMIN — SODIUM CHLORIDE 100 ML/HR: 900 INJECTION, SOLUTION INTRAVENOUS at 07:28

## 2024-02-20 RX ADMIN — DOCUSATE SODIUM 100 MG: 100 CAPSULE, LIQUID FILLED ORAL at 20:26

## 2024-02-20 RX ADMIN — VENLAFAXINE HYDROCHLORIDE 50 MG: 50 TABLET ORAL at 05:00

## 2024-02-20 RX ADMIN — MEMANTINE 10 MG: 5 TABLET ORAL at 09:37

## 2024-02-20 RX ADMIN — LORATADINE 10 MG: 10 TABLET ORAL at 09:36

## 2024-02-20 RX ADMIN — AZELASTINE HYDROCHLORIDE 1 SPRAY: 137 SPRAY, METERED NASAL at 09:38

## 2024-02-20 RX ADMIN — BUDESONIDE 3 MG: 3 CAPSULE, GELATIN COATED ORAL at 09:36

## 2024-02-20 RX ADMIN — GABAPENTIN 300 MG: 300 CAPSULE ORAL at 20:26

## 2024-02-20 RX ADMIN — KETOROLAC TROMETHAMINE 15 MG: 30 INJECTION INTRAMUSCULAR; INTRAVENOUS at 05:00

## 2024-02-20 RX ADMIN — GABAPENTIN 300 MG: 300 CAPSULE ORAL at 15:11

## 2024-02-20 RX ADMIN — GABAPENTIN 300 MG: 300 CAPSULE ORAL at 09:37

## 2024-02-20 RX ADMIN — AMLODIPINE BESYLATE 5 MG: 5 TABLET ORAL at 09:37

## 2024-02-20 RX ADMIN — AZELASTINE HYDROCHLORIDE 1 SPRAY: 137 SPRAY, METERED NASAL at 20:27

## 2024-02-20 RX ADMIN — MEMANTINE 10 MG: 5 TABLET ORAL at 20:26

## 2024-02-20 RX ADMIN — TAMSULOSIN HYDROCHLORIDE 0.4 MG: 0.4 CAPSULE ORAL at 09:36

## 2024-02-20 RX ADMIN — HEPARIN SODIUM 5000 UNITS: 5000 INJECTION, SOLUTION INTRAVENOUS; SUBCUTANEOUS at 20:26

## 2024-02-20 RX ADMIN — DOCUSATE SODIUM 100 MG: 100 CAPSULE, LIQUID FILLED ORAL at 09:37

## 2024-02-20 RX ADMIN — Medication 3 MG: at 20:26

## 2024-02-20 RX ADMIN — CEFTRIAXONE SODIUM 1 G: 1 INJECTION, SOLUTION INTRAVENOUS at 15:11

## 2024-02-20 RX ADMIN — TRAZODONE HYDROCHLORIDE 100 MG: 100 TABLET ORAL at 20:26

## 2024-02-20 RX ADMIN — HEPARIN SODIUM 5000 UNITS: 5000 INJECTION, SOLUTION INTRAVENOUS; SUBCUTANEOUS at 09:37

## 2024-02-20 ASSESSMENT — PAIN SCALES - GENERAL
PAINLEVEL_OUTOF10: 2
PAINLEVEL_OUTOF10: 6
PAINLEVEL_OUTOF10: 4
PAINLEVEL_OUTOF10: 5 - MODERATE PAIN
PAINLEVEL_OUTOF10: 5 - MODERATE PAIN

## 2024-02-20 ASSESSMENT — COGNITIVE AND FUNCTIONAL STATUS - GENERAL
MOBILITY SCORE: 18
DRESSING REGULAR LOWER BODY CLOTHING: TOTAL
DAILY ACTIVITIY SCORE: 12
TOILETING: A LOT
STANDING UP FROM CHAIR USING ARMS: A LITTLE
MOVING TO AND FROM BED TO CHAIR: A LITTLE
WALKING IN HOSPITAL ROOM: A LITTLE
MOBILITY SCORE: 18
PERSONAL GROOMING: A LOT
EATING MEALS: A LITTLE
TURNING FROM BACK TO SIDE WHILE IN FLAT BAD: A LITTLE
DRESSING REGULAR UPPER BODY CLOTHING: A LOT
MOVING FROM LYING ON BACK TO SITTING ON SIDE OF FLAT BED WITH BEDRAILS: A LITTLE
CLIMB 3 TO 5 STEPS WITH RAILING: A LITTLE
CLIMB 3 TO 5 STEPS WITH RAILING: A LITTLE
STANDING UP FROM CHAIR USING ARMS: A LITTLE
TOILETING: A LOT
HELP NEEDED FOR BATHING: A LOT
DRESSING REGULAR UPPER BODY CLOTHING: A LOT
MOVING TO AND FROM BED TO CHAIR: A LITTLE
DAILY ACTIVITIY SCORE: 12
WALKING IN HOSPITAL ROOM: A LITTLE
PERSONAL GROOMING: A LOT
MOVING FROM LYING ON BACK TO SITTING ON SIDE OF FLAT BED WITH BEDRAILS: A LITTLE
HELP NEEDED FOR BATHING: A LOT
DRESSING REGULAR LOWER BODY CLOTHING: TOTAL
EATING MEALS: A LITTLE
TURNING FROM BACK TO SIDE WHILE IN FLAT BAD: A LITTLE

## 2024-02-20 ASSESSMENT — ACTIVITIES OF DAILY LIVING (ADL)
ADL_ASSISTANCE: INDEPENDENT
BATHING_ASSISTANCE: MAXIMAL
ADL_ASSISTANCE: INDEPENDENT

## 2024-02-20 ASSESSMENT — PAIN - FUNCTIONAL ASSESSMENT
PAIN_FUNCTIONAL_ASSESSMENT: 0-10

## 2024-02-20 NOTE — PROGRESS NOTES
Occupational Therapy    Evaluation    Patient Name: Demarco Gudino  MRN: [id number]  Today's Date: 2/20/2024  Time Calculation  Start Time: 0821  Stop Time: 0837  Time Calculation (min): 16 min    Assessment  IP OT Assessment  OT Assessment: Patient is an 83 year old male admitted with weakness, failure to thrive, HTN, UTI, and HLD. Patient is presenting below baseline level and unable to safely care for himself. Patient would benefit from continued skilled therapy in order to address the above deficits and increase patient's safety and indpedence with daily tasks.  Prognosis: Good  Barriers to Discharge: Decreased caregiver support  Evaluation/Treatment Tolerance: Patient limited by fatigue  End of Session Communication: Bedside nurse  End of Session Patient Position: Bed, 3 rail up, Alarm on  Plan:  Treatment Interventions: ADL retraining, Functional transfer training, UE strengthening/ROM, Endurance training, Patient/family training, Neuromuscular reeducation, Compensatory technique education, Fine motor coordination activities  OT Frequency: 2 times per week  OT Discharge Recommendations: Moderate intensity level of continued care  Equipment Recommended upon Discharge: Wheeled walker  OT Recommended Transfer Status: Assist of 1  OT - OK to Discharge: Yes    Subjective   Current Problem:  1. Weakness          General:  General  Reason for Referral: decline in ADLs  Referred By: Carmen Luther  Past Medical History Relevant to Rehab: anxiety, neuropathy, dementia, depression, HLD, ANALILIA  Prior to Session Communication: Bedside nurse  Patient Position Received: Bed, 3 rail up, Alarm on  Preferred Learning Style: verbal  General Comment: Patient is an 83 year old male who presented to the ED after not being able to eat/drink the last 3 days. Per MR, patient with a recent hospitalization for weakness and ANALILIA. Patient is admitted with weakness, UTI, HTN, HLD, and failure to thrive in adult. Xray of the right shoulder  completed indicating overly subluxed inferiorly  Precautions:  Medical Precautions: Fall precautions  Pain:  Pain Assessment  Pain Assessment: 0-10  Pain Score: 5 - Moderate pain  Pain Type: Chronic pain  Pain Location: Back  Pain Interventions: Ambulation/increased activity, Repositioned    Objective   Cognition:  Overall Cognitive Status:  (patient able to follow commands, appears lethargic with increased processing time)     Home Living:  Type of Home: House  Lives With: Alone (wife is long term at [place])  Home Adaptive Equipment: Walker rolling or standard, Cane (rollator)  Home Layout: Multi-level, Stairs to alternate level with rails  Alternate Level Stairs-Rails: Both  Alternate Level Stairs-Number of Steps: 14  Home Access: Stairs to enter with rails  Entrance Stairs-Rails: Both  Entrance Stairs-Number of Steps: 4  Bathroom Shower/Tub: Tub/shower unit  Bathroom Toilet: Handicapped height  Bathroom Equipment: Grab bars in shower, Shower chair without back   Prior Function:  Level of [place]: Independent with ADLs and functional transfers, Independent with homemaking with ambulation  ADL Assistance: Independent (however, has not been able to complete)  Homemaking Assistance: Independent (however, has not been able to manage)  Ambulatory Assistance: Independent (with rollator)  Vocational: Retired  Hand Dominance: Right  Prior Function Comments: patient reports that he is typically independent with ADLs/IADLs but has not been able to manage at home. reports that he has not showered in over a month, as he has not been able to complete this task  IADL History:  Homemaking Responsibilities: Yes  IADL Comments: patient has been unable to complete IADL tasks  ADL:  Eating Assistance: Stand by  Eating Deficit: Setup (seated, items within reach)  Grooming Assistance: Moderate  Grooming Deficit: Setup, Supervision/safety, Increased time to complete, Brushing hair (per clinical judgement)  Bathing Assistance:  Maximal  Bathing Deficit: Setup, Supervision/safety, Increased time to complete , Right arm, Left arm, Buttocks, Right lower leg including foot, Left lower leg including foot (per clinical judgement)  UE Dressing Assistance: Moderate  UE Dressing Deficit: Thread RUE, Thread LUE, Pull over head (per clinical judgement)  LE Dressing Assistance: Total  LE Dressing Deficit: Don/doff R sock, Don/doff L sock  Toileting Assistance with Device: Maximal  Toileting Deficit: Setup, Verbal cueing, Supervison/safety, Clothing management up, Clothing management down, Perineal hygiene (per clinical judgement)  Activity Tolerance:  Endurance: Decreased tolerance for upright activites  Bed Mobility/Transfers: Bed Mobility  Bed Mobility: Yes  Bed Mobility 1  Bed Mobility 1: Supine to sitting  Level of Assistance 1: Close supervision  Bed Mobility Comments 1: effortful  Bed Mobility 2  Bed Mobility  2: Sitting to supine  Level of Assistance 2: Close supervision  Bed Mobility Comments 2: effortful    Transfers  Transfer: Yes  Transfer 1  Transfer From 1: Bed to  Transfer to 1: Stand  Technique 1: Sit to stand  Transfer Device 1: Walker  Transfer Level of Assistance 1: Contact guard  Trials/Comments 1: patient takes ~6 lateral side steps alongside bed and returns to a seated position    IADL's:   Homemaking Responsibilities: Yes  IADL Comments: patient has been unable to complete IADL tasks  Vision: Vision - Basic Assessment  Current Vision: Wears glasses all the time  Sensation:  Sensation Comment: patient reports R hand is numb, both feet decreaesd sensation  Strength:  Strength Comments: B UE impaired. B shoulder 2-/5. distal 3-/5  Coordination:  Coordination Comment: increased time to complete finger to thumb opposition   Hand Function:  Hand Function  Gross Grasp: Impaired (edema in R hand)  Coordination: Impaired (due to decreased ROM and increased edema)  Extremities: RUE   RUE : Exceptions to WFL and LUE   LUE: Exceptions to  [facility]    Outcome Measures: [facility] Daily Activity  Putting on and taking off regular lower body clothing: Total  Bathing (including washing, rinsing, drying): A lot  Putting on and taking off regular upper body clothing: A lot  Toileting, which includes using toilet, bedpan or urinal: A lot  Taking care of personal grooming such as brushing teeth: A lot  Eating Meals: A little  Daily Activity - Total Score: 12    Education Documentation  Body Mechanics, taught by Lashawn Henry OT at 2/20/2024  9:28 AM.  Learner: Patient  Readiness: Acceptance  Method: Explanation, Demonstration  Response: Needs Reinforcement    Precautions, taught by Lashawn Henry OT at 2/20/2024  9:28 AM.  Learner: Patient  Readiness: Acceptance  Method: Explanation, Demonstration  Response: Needs Reinforcement    Education Comments  No comments found.      Goals:   Encounter Problems       Encounter Problems (Active)       OT Goals       ADLs (Progressing)       Start:  02/20/24    Expected End:  03/21/24       Patient will complete ADL tasks with Mod I, using AE as needed, in order to increase patient's safety and independence with self-care tasks.         Functional Transfers (Progressing)       Start:  02/20/24    Expected End:  03/21/24       Patient will complete functional transfers with Mod I in order to increase patient's safety and independence with daily tasks.         B UE Strengthening (Progressing)       Start:  02/20/24    Expected End:  03/21/24       Patient will increase B UE strength to 4-/5 for functional transfers.         Functional Mobility (Progressing)       Start:  02/20/24    Expected End:  03/21/24       Patient will demonstrate the ability to complete item retrieval and functional mobility with Mod I in order to increase patient's safety and independence with daily tasks.

## 2024-02-20 NOTE — CARE PLAN
The patient's goals for the shift include      The clinical goals for the shift include rest      Problem: Pain - Adult  Goal: Verbalizes/displays adequate comfort level or baseline comfort level  Outcome: Progressing     Problem: Safety - Adult  Goal: Free from fall injury  Outcome: Progressing     Problem: Discharge Planning  Goal: Discharge to home or other facility with appropriate resources  Outcome: Progressing     Problem: Chronic Conditions and Co-morbidities  Goal: Patient's chronic conditions and co-morbidity symptoms are monitored and maintained or improved  Outcome: Progressing     Problem: Skin  Goal: Decreased wound size/increased tissue granulation at next dressing change  Outcome: Progressing  Goal: Participates in plan/prevention/treatment measures  Outcome: Progressing  Goal: Prevent/manage excess moisture  Outcome: Progressing  Flowsheets (Taken 2/19/2024 2210)  Prevent/manage excess moisture:   Cleanse incontinence/protect with barrier cream   Moisturize dry skin  Goal: Prevent/minimize sheer/friction injuries  Outcome: Progressing  Goal: Promote/optimize nutrition  Outcome: Progressing  Goal: Promote skin healing  Outcome: Progressing

## 2024-02-20 NOTE — NURSING NOTE
Assumed care of patient.  Patient resting in bed, asleep.  BSSR completed.  Bed low and call light within reach.

## 2024-02-20 NOTE — PROGRESS NOTES
Physical Therapy    Physical Therapy Evaluation & Treatment    Patient Name: Demarco Gudino  MRN: [id number]  Today's Date: 2/20/2024   Time Calculation  Start Time: 1115  Stop Time: 1139  Time Calculation (min): 24 min    Assessment/Plan   PT Assessment  PT Assessment Results: Decreased strength, Decreased endurance, Impaired balance, Decreased mobility, Decreased safety awareness, Impaired vision, Impaired judgement, Impaired sensation, Pain  Rehab Prognosis: Good  Evaluation/Treatment Tolerance: Patient limited by fatigue  Medical Staff Made Aware: Yes  Strengths: Ability to acquire knowledge  Barriers to Participation: Comorbidities  End of Session Communication: Bedside nurse  Assessment Comment: Pt is an 83 y.o. male adm for weakness, failure to thrive. Pt lives alone, has been declining in ability to care for self and home over the past few months. Pt does not drive, has some assist from neighbor w/ that, reports had a girl helping him at home but she has not been around for almost a couple of weeks. Pt w/ weakness and decreased tolerance to activity on eval, baseline function declining to point of being unable and unsafe to mange alone. Pt would benefit from continued skilled PT services to improve, strength and safe functional mobility.  End of Session Patient Position: Up in chair, Alarm on   IP OR SWING BED PT PLAN  Inpatient or Swing Bed: Inpatient  PT Plan  Treatment/Interventions: Transfer training, Gait training, Balance training, Strengthening, Endurance training, Range of motion, Therapeutic exercise, Therapeutic activity  PT Plan: Skilled PT  PT Frequency: 4 times per week  PT Discharge Recommendations: Moderate intensity level of continued care  Equipment Recommended upon Discharge: Wheeled walker  PT Recommended Transfer Status: Assist x1, Assistive device  PT - OK to Discharge: Yes      Subjective     General Visit Information:  General  Reason for Referral: impaired mobility  Referred By: Carmen  Homa  Past Medical History Relevant to Rehab: anxiety, neuropathy, dementia, depression, HLD, ANALILIA, HTN, UTI, OA, bilat TKA, Rt RCR, reverse TSA, suicidal ideation  Family/Caregiver Present: No  Prior to Session Communication: Bedside nurse  Patient Position Received: Bed, 3 rail up, Alarm on  Preferred Learning Style: verbal  General Comment: Pt agreeable to PT eval,  wants to walk due to feeling constipated; cleared per nurse  Home Living:  Home Living  Type of Home: House  Lives With: Alone (wife is long term at [place])  Home Adaptive Equipment: Walker rolling or standard, Cane (rollator)  Home Layout: Multi-level, Stairs to alternate level with rails  Alternate Level Stairs-Rails: Both  Alternate Level Stairs-Number of Steps: 14  Home Access: Stairs to enter with rails  Entrance Stairs-Rails: Both  Entrance Stairs-Number of Steps: 4  Bathroom Shower/Tub: Tub/shower unit  Bathroom Toilet: Handicapped height  Bathroom Equipment: Grab bars in shower, Shower chair without back  Bathroom Accessibility: reports does not have bathroom on main, uses urinal.  Prior Level of Function:  Prior Function Per Pt/Caregiver Report  Receives Help From:  (does shopping for pt, pt does not drive)  ADL Assistance: Independent (reports has been very challenging lately, difficult to complete)  Homemaking Assistance: Independent (unable to manage lately)  Ambulatory Assistance: Independent (rollator for past few months)  Vocational: Retired  Hand Dominance: Right  Prior Function Comments: Pt does not drive, denies falls, notes that managing self and home has been challenging for past few months, has not showered in over a month  Precautions:  Precautions  Hearing/Visual Limitations: glasses  Medical Precautions: Fall precautions  Precautions Comment: Xray of the right shoulder completed indicating overly subluxed inferiorly    Objective   Pain:  Pain Assessment  Pain Assessment: 0-10  Pain Score: 5 - Moderate pain  Pain Type:  "Chronic pain  Pain Location:  (reports \"whole body\")  Pain Interventions:  (pt to call nurse when ready to address)  Cognition:  Cognition  Overall Cognitive Status: Within Functional Limits (O x 4)    General Assessments:  Activity Tolerance  Endurance: Decreased tolerance for upright activites  Activity Tolerance Comments: Fair-; weak, easily fatigued    Sensation  Sensation Comment: patient reports R hand is numb, both feet decreaesd sensation    Strength  Strength Comments: Rt a little weaker than Lt ~ 4-/5 throughout  Strength  Strength Comments: Rt a little weaker than Lt ~ 4-/5 throughout    Postural Control  Posture Comment: forward flexed    Dynamic Standing Balance  Dynamic Standing-Balance Support: Bilateral upper extremity supported  Dynamic Standing-Balance:  (amb w/ turns)  Dynamic Standing-Comments: Fair-, mild unsteadiness but no LOB  Functional Assessments:  Bed Mobility  Bed Mobility: Yes  Bed Mobility 1  Bed Mobility 1: Supine to sitting  Level of Assistance 1: Close supervision  Bed Mobility Comments 1: to Rt EOB, HOB elevated slightly    Transfers  Transfer: Yes  Transfer 1  Technique 1: Sit to stand, Stand to sit  Transfer Device 1: Walker  Transfer Level of Assistance 1: Close supervision  Trials/Comments 1: performed from EOB to bedside chair    Ambulation/Gait Training  Ambulation/Gait Training Performed: Yes  Ambulation/Gait Training 1  Surface 1: Level tile  Device 1: Rolling walker  Assistance 1: Contact guard  Comments/Distance (ft) 1: Pt amb ~ 5' x 1, 50' x 1, slow to fair pace, forward flexed posture, decreased step length, mild unsteadiness but no LOB, pt denied any dizziness.  Extremity/Trunk Assessments:  RLE   RLE : Within Functional Limits  LLE   LLE : Within Functional Limits  Treatments:  Ambulation/Gait Training  Ambulation/Gait Training Performed: Yes  Ambulation/Gait Training 1  Surface 1: Level tile  Device 1: Rolling walker  Assistance 1: Contact guard  Comments/Distance " (ft) 1: Pt amb ~ 5' x 1, 50' x 1, slow to fair pace, forward flexed posture, decreased step length, mild unsteadiness but no LOB, pt denied any dizziness.  Outcome Measures:  [facility] Basic Mobility  Turning from your back to your side while in a flat bed without using bedrails: A little  Moving from lying on your back to sitting on the side of a flat bed without using bedrails: A little  Moving to and from bed to chair (including a wheelchair): A little  Standing up from a chair using your arms (e.g. wheelchair or bedside chair): A little  To walk in hospital room: A little  Climbing 3-5 steps with railing: A little  Basic Mobility - Total Score: 18    Encounter Problems       Encounter Problems (Active)       Mobility       STG - Patient will ambulate 65' w/ distant supervision and LRAD prn (Progressing)       Start:  02/20/24    Expected End:  02/27/24            Pt will tolerate at least 15 reps of at least 5 different LE exercises consistently during session  (Progressing)       Start:  02/20/24    Expected End:  02/27/24               Transfers       STG - Patient will transfer sit to and from stand w/ distant supervision, safe technique (Progressing)       Start:  02/20/24    Expected End:  02/27/24                   Education Documentation  Mobility Training, taught by Daiana Horta, PT at 2/20/2024 12:16 PM.  Learner: Patient  Readiness: Acceptance  Method: Explanation  Response: Demonstrated Understanding    Education Comments  No comments found.

## 2024-02-20 NOTE — PROGRESS NOTES
Demarco Gudino is a 83 y.o. male on day 1 of admission presenting with Weakness.      Subjective   Patient seen and examined. Resting in bed. Just got washed up with the aides. States he is feeling a little better, still quite weak. Denies any abd pain or SOB. He is voiding. Eating and drinking without issue.        Objective     Last Recorded Vitals  BP (!) 127/96 (BP Location: Left arm, Patient Position: Lying)   Pulse 73   Temp 36.8 °C (98.2 °F) (Oral)   Resp 18   Wt 88.6 kg (195 lb 5.2 oz)   SpO2 96%   Intake/Output last 3 Shifts:    Intake/Output Summary (Last 24 hours) at 2/20/2024 1257  Last data filed at 2/20/2024 0450  Gross per 24 hour   Intake 2006.67 ml   Output 831 ml   Net 1175.67 ml       Admission Weight  Weight: 88.6 kg (195 lb 5.2 oz) (02/19/24 1304)    Daily Weight  02/19/24 : 88.6 kg (195 lb 5.2 oz)    Image Results  XR ankle right 3+ views  Narrative: Interpreted By:  Lenny Wilson,   STUDY:  XR ANKLE RIGHT 3+ VIEWS; 2/19/2024 6:48 pm      INDICATION:  Signs/Symptoms:Edema, pain      COMPARISON:  May 2013.      ACCESSION NUMBER(S):  [id number]      ORDERING CLINICIAN:  IFEANYI FOWLER      TECHNIQUE:  Number of films: Three-view radiographs of the  ankle.      FINDINGS:  There is no fracture, blastic or lytic bone lesion. The ankle mortise  and subtalar joints are intact. The alignment is anatomic. Calcaneal  spur is noted. Calcifications are seen above the calcaneal  tuberosity, in the region of the Achilles tendon insertion. There is  moderate soft tissue swelling over the lateral malleolus.      Impression: Soft tissue swelling without fracture or subluxation.          Signed by: Lenny Wilson 2/19/2024 8:09 PM  Dictation workstation:   [id number]  XR shoulder left 2+ views  Narrative: Interpreted By:  Reji Santiago,   STUDY:  XR SHOULDER LEFT 2+ VIEWS; 2/19/2024 2:52 pm      INDICATION:  Signs/Symptoms:shoulder pain. Pain.      COMPARISON:  10/06/2021      ACCESSION  NUMBER(S):  [id number]      ORDERING CLINICIAN:  CHARLES PRICE      TECHNIQUE:  AP and Y-views of the left shoulder.      FINDINGS:  Left total reverse shoulder prosthesis is present. The shoulder  prosthesis appears subluxed without ryan dislocation although given  the degree of inferior subluxation of the humeral component, clinical  correlation is recommended. No evidence for acute fracture.  Prosthesis appears intact. No acute osseous abnormality is seen.      Impression: The humeral component appears overly subluxed inferiorly, clinical  correlation is recommended. No evidence for an acute osseous  abnormality.      Signed by: Reji Santiago 2/19/2024 3:47 PM  Dictation workstation:   [id number]  XR chest 2 views  Narrative: Interpreted By:  Lenny Wilson,   STUDY:  XR CHEST 2 VIEWS; 2/19/2024 2:08 pm      INDICATION:  Signs/Symptoms:weakness      COMPARISON:  02/05/2024.      ACCESSION NUMBER(S):  [id number]      ORDERING CLINICIAN:  CHARLES PRICE      TECHNIQUE:  Number of films: Two-view radiographs of the chest were obtained.      FINDINGS:  The cardiac silhouette appears normal.  Calcifications involve the tortuous aorta.  The lungs are clear.  No pleural effusions are seen.  Degenerative changes involve the spine and right shoulder; left  shoulder arthroplasty is again in position.      Impression: No acute cardiopulmonary disease.      Signed by: Lenny Wilson 2/19/2024 3:39 PM  Dictation workstation:   [id number]  XR wrist right 3+ views  Narrative: Interpreted By:  Lenny Wilson,   STUDY:  XR WRIST RIGHT 3+ VIEWS; ;  2/19/2024 2:08 pm      INDICATION:  Signs/Symptoms:R wrist swelling.      COMPARISON:  None.      ACCESSION NUMBER(S):  [id number]      ORDERING CLINICIAN:  SHASHI MARION      FINDINGS:  There is no fracture.  Marked degenerative changes of the 1st, 2nd, 3rd and 5th  metacarpophalangeal joints are present, with joint space narrowing,  subchondral bone sclerosis and  laxity of the 1st metacarpophalangeal  joint. Marked osteoarthritis involves the radiocarpal joint which  appears to be posttraumatic, with associated scapholunate separation  and proximal migration of the capitate. Degenerative changes also  involve the 1st carpometacarpal joint. There is mild diffuse soft  tissue swelling. Calcifications involve the triangular fibrocartilage.      Impression: 1. 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  2. Additional marked osteoarthritis of several metacarpophalangeal  joints as above.  3. No acute fracture seen.      Signed by: Lenny Wilson 2/19/2024 3:37 PM  Dictation workstation:   [id number]  ECG 12 lead  Normal sinus rhythm  Normal ECG  When compared with ECG of 05-FEB-2024 14:55,  No significant change was found      Physical Exam    General: Alert and oriented x3, pleasant.   Cardiac: Regular rate and rhythm, S1/S2 , no murmur.   Pulmonary: Clear to auscultation on room air.   Abdomen: Soft, round, nontender. BS +x4.   Extremities: Edema to the R wrist and R ankle.   Skin: No rashes or lesions.      Relevant Results    Scheduled medications  amLODIPine, 5 mg, oral, Daily  azelastine, 1 spray, Each Nostril, BID  budesonide EC, 3 mg, oral, Daily  cefTRIAXone, 1 g, intravenous, q24h  docusate sodium, 100 mg, oral, BID  gabapentin, 300 mg, oral, TID  heparin (porcine), 5,000 Units, subcutaneous, q12h  loratadine, 10 mg, oral, Daily  melatonin, 3 mg, oral, Nightly  memantine, 10 mg, oral, BID  polyethylene glycol, 17 g, oral, Daily  tamsulosin, 0.4 mg, oral, Daily  traZODone, 100 mg, oral, Nightly  venlafaxine, 50 mg, oral, Daily      Continuous medications  sodium chloride 0.9%, 100 mL/hr, Last Rate: 100 mL/hr (02/20/24 0728)      PRN medications  PRN medications: acetaminophen, hydrOXYzine pamoate, ketorolac, polyvinyl alcohol     Results for orders placed or performed  during the hospital encounter of 02/19/24 (from the past 24 hour(s))   CBC and Auto Differential   Result Value Ref Range    WBC 4.4 4.4 - 11.3 x10*3/uL    nRBC 0.0 0.0 - 0.0 /100 WBCs    RBC 3.54 (L) 4.50 - 5.90 x10*6/uL    Hemoglobin 11.8 (L) 13.5 - 17.5 g/dL    Hematocrit 33.8 (L) 41.0 - 52.0 %    MCV 96 80 - 100 fL    MCH 33.3 26.0 - 34.0 pg    MCHC 34.9 32.0 - 36.0 g/dL    RDW 12.2 11.5 - 14.5 %    Platelets 260 150 - 450 x10*3/uL    Neutrophils % 75.2 40.0 - 80.0 %    Immature Granulocytes %, Automated 0.9 0.0 - 0.9 %    Lymphocytes % 14.0 13.0 - 44.0 %    Monocytes % 9.2 2.0 - 10.0 %    Eosinophils % 0.2 0.0 - 6.0 %    Basophils % 0.5 0.0 - 2.0 %    Neutrophils Absolute 3.34 1.60 - 5.50 x10*3/uL    Immature Granulocytes Absolute, Automated 0.04 0.00 - 0.50 x10*3/uL    Lymphocytes Absolute 0.62 (L) 0.80 - 3.00 x10*3/uL    Monocytes Absolute 0.41 0.05 - 0.80 x10*3/uL    Eosinophils Absolute 0.01 0.00 - 0.40 x10*3/uL    Basophils Absolute 0.02 0.00 - 0.10 x10*3/uL   Sars-CoV-2 and Influenza A/B PCR   Result Value Ref Range    Flu A Result Not Detected Not Detected    Flu B Result Not Detected Not Detected    Coronavirus 2019, PCR Not Detected Not Detected   Sedimentation rate, automated   Result Value Ref Range    Sedimentation Rate 78 (H) 0 - 20 mm/h   ECG 12 lead   Result Value Ref Range    Ventricular Rate 90 BPM    Atrial Rate 90 BPM    CO Interval 184 ms    QRS Duration 80 ms    QT Interval 370 ms    QTC Calculation(Bazett) 452 ms    P Axis 81 degrees    R Axis 86 degrees    T Axis 80 degrees    QRS Count 15 beats    Q Onset 222 ms    P Onset 130 ms    P Offset 199 ms    T Offset 407 ms    QTC Fredericia 423 ms   Comprehensive metabolic panel   Result Value Ref Range    Glucose 80 65 - 99 mg/dL    Sodium 134 133 - 145 mmol/L    Potassium 4.1 3.4 - 5.1 mmol/L    Chloride 99 97 - 107 mmol/L    Bicarbonate 18 (L) 24 - 31 mmol/L    Urea Nitrogen 15 8 - 25 mg/dL    Creatinine 1.00 0.40 - 1.60 mg/dL    eGFR 75  >60 mL/min/1.73m*2    Calcium 8.8 8.5 - 10.4 mg/dL    Albumin 3.1 (L) 3.5 - 5.0 g/dL    Alkaline Phosphatase 88 35 - 125 U/L    Total Protein 6.5 5.9 - 7.9 g/dL    AST 18 5 - 40 U/L    Bilirubin, Total 0.5 0.1 - 1.2 mg/dL    ALT 12 5 - 40 U/L    Anion Gap 17 <=19 mmol/L   C-reactive protein   Result Value Ref Range    C-Reactive Protein 6.90 (H) 0.00 - 2.00 mg/dL   Uric Acid   Result Value Ref Range    Uric Acid 4.7 3.6 - 7.7 mg/dL   Urinalysis with Reflex Culture and Microscopic   Result Value Ref Range    Color, Urine Yellow Light-Yellow, Yellow, Dark-Yellow    Appearance, Urine Clear Clear    Specific Gravity, Urine 1.023 1.005 - 1.035    pH, Urine 5.5 5.0, 5.5, 6.0, 6.5, 7.0, 7.5, 8.0    Protein, Urine 50 (1+) (A) NEGATIVE, 10 (TRACE), 20 (TRACE) mg/dL    Glucose, Urine Normal Normal mg/dL    Blood, Urine NEGATIVE NEGATIVE    Ketones, Urine 100 (3+) (A) NEGATIVE mg/dL    Bilirubin, Urine NEGATIVE NEGATIVE    Urobilinogen, Urine 2 (1+) (A) Normal mg/dL    Nitrite, Urine NEGATIVE NEGATIVE    Leukocyte Esterase, Urine 75 Altagracia/µL (A) NEGATIVE   Microscopic Only, Urine   Result Value Ref Range    WBC, Urine 21-50 (A) 1-5, NONE /HPF    RBC, Urine 1-2 NONE, 1-2, 3-5 /HPF    Bacteria, Urine 2+ (A) NONE SEEN /HPF    Mucus, Urine FEW Reference range not established. /LPF   CBC   Result Value Ref Range    WBC 3.1 (L) 4.4 - 11.3 x10*3/uL    nRBC 0.0 0.0 - 0.0 /100 WBCs    RBC 3.16 (L) 4.50 - 5.90 x10*6/uL    Hemoglobin 10.4 (L) 13.5 - 17.5 g/dL    Hematocrit 30.5 (L) 41.0 - 52.0 %    MCV 97 80 - 100 fL    MCH 32.9 26.0 - 34.0 pg    MCHC 34.1 32.0 - 36.0 g/dL    RDW 12.1 11.5 - 14.5 %    Platelets 212 150 - 450 x10*3/uL   Comprehensive metabolic panel   Result Value Ref Range    Glucose 94 65 - 99 mg/dL    Sodium 137 133 - 145 mmol/L    Potassium 3.7 3.4 - 5.1 mmol/L    Chloride 107 97 - 107 mmol/L    Bicarbonate 21 (L) 24 - 31 mmol/L    Urea Nitrogen 15 8 - 25 mg/dL    Creatinine 1.00 0.40 - 1.60 mg/dL    eGFR 75 >60  mL/min/1.73m*2    Calcium 8.6 8.5 - 10.4 mg/dL    Albumin 2.7 (L) 3.5 - 5.0 g/dL    Alkaline Phosphatase 76 35 - 125 U/L    Total Protein 5.7 (L) 5.9 - 7.9 g/dL    AST 13 5 - 40 U/L    Bilirubin, Total 0.3 0.1 - 1.2 mg/dL    ALT 10 5 - 40 U/L    Anion Gap 9 <=19 mmol/L           Assessment/Plan      Weakness / Failure to Thrive  -PT/OT evals, recommending moderate intensity therapy.   -Denies any recent falls.   -Possibly worsened with the possible UTI, will monitor his response to treating.   -IVF hydration, will stop today as he has been eating and drinking.      UTI  -UA with leuks, awaiting urine culture which is pending.   -IV ceftriaxone.  -Check post-void bladder scan- done last night, only 80ml of urine, per nurse he has been voiding without issues.   -Bowel regimen to avoid constipation.      Dehydration  -IVF hydration, stop today and monitor.    -States he has not ate or drank much at all over the past 3 days prior to admission.      Right Ankle and R Wrist Edema  -He denies any injury or falls. Mild pain in these joints. Likely from his severe OA.   -XR imaging of the wrist and R ankle as above.   -ESR, CRP are elevated and Uric Acid is normal.   -Denies significant pain and has full ROM.      HTN  -Continue Norvasc.   -BP stable. Monitor.      HLD  -Holding statin for now with his weakness complaints.      DVT Risk  -Heparin subcutaneous.      Plan  Continue IV abx, awaiting urine culture.   Stop IVF today.   PT/OT, mobilize.   Plan for SNF at [place].         SERGIO Da Silva-CNP

## 2024-02-21 LAB
ANION GAP SERPL CALC-SCNC: 9 MMOL/L
BUN SERPL-MCNC: 13 MG/DL (ref 8–25)
CALCIUM SERPL-MCNC: 8.6 MG/DL (ref 8.5–10.4)
CHLORIDE SERPL-SCNC: 107 MMOL/L (ref 97–107)
CO2 SERPL-SCNC: 22 MMOL/L (ref 24–31)
CREAT SERPL-MCNC: 1 MG/DL (ref 0.4–1.6)
EGFRCR SERPLBLD CKD-EPI 2021: 75 ML/MIN/1.73M*2
GLUCOSE SERPL-MCNC: 112 MG/DL (ref 65–99)
POTASSIUM SERPL-SCNC: 3.6 MMOL/L (ref 3.4–5.1)
SODIUM SERPL-SCNC: 138 MMOL/L (ref 133–145)

## 2024-02-21 PROCEDURE — 90662 IIV NO PRSV INCREASED AG IM: CPT | Performed by: NURSE PRACTITIONER

## 2024-02-21 PROCEDURE — 2500000004 HC RX 250 GENERAL PHARMACY W/ HCPCS (ALT 636 FOR OP/ED): Performed by: NURSE PRACTITIONER

## 2024-02-21 PROCEDURE — 2500000002 HC RX 250 W HCPCS SELF ADMINISTERED DRUGS (ALT 637 FOR MEDICARE OP, ALT 636 FOR OP/ED): Performed by: NURSE PRACTITIONER

## 2024-02-21 PROCEDURE — 1100000001 HC PRIVATE ROOM DAILY

## 2024-02-21 PROCEDURE — 97530 THERAPEUTIC ACTIVITIES: CPT | Mod: GP

## 2024-02-21 PROCEDURE — 2500000001 HC RX 250 WO HCPCS SELF ADMINISTERED DRUGS (ALT 637 FOR MEDICARE OP): Performed by: NURSE PRACTITIONER

## 2024-02-21 PROCEDURE — 36415 COLL VENOUS BLD VENIPUNCTURE: CPT | Performed by: NURSE PRACTITIONER

## 2024-02-21 PROCEDURE — 97116 GAIT TRAINING THERAPY: CPT | Mod: GP

## 2024-02-21 PROCEDURE — G0008 ADMIN INFLUENZA VIRUS VAC: HCPCS | Performed by: NURSE PRACTITIONER

## 2024-02-21 PROCEDURE — 80048 BASIC METABOLIC PNL TOTAL CA: CPT | Performed by: NURSE PRACTITIONER

## 2024-02-21 RX ADMIN — POLYETHYLENE GLYCOL 3350 17 G: 17 POWDER, FOR SOLUTION ORAL at 09:30

## 2024-02-21 RX ADMIN — MEMANTINE 10 MG: 5 TABLET ORAL at 09:30

## 2024-02-21 RX ADMIN — INFLUENZA A VIRUS A/VICTORIA/4897/2022 IVR-238 (H1N1) ANTIGEN (FORMALDEHYDE INACTIVATED), INFLUENZA A VIRUS A/DARWIN/9/2021 SAN-010 (H3N2) ANTIGEN (FORMALDEHYDE INACTIVATED), INFLUENZA B VIRUS B/PHUKET/3073/2013 ANTIGEN (FORMALDEHYDE INACTIVATED), AND INFLUENZA B VIRUS B/MICHIGAN/01/2021 ANTIGEN (FORMALDEHYDE INACTIVATED) 0.7 ML: 60; 60; 60; 60 INJECTION, SUSPENSION INTRAMUSCULAR at 17:32

## 2024-02-21 RX ADMIN — CEFTRIAXONE SODIUM 1 G: 1 INJECTION, SOLUTION INTRAVENOUS at 15:57

## 2024-02-21 RX ADMIN — DOCUSATE SODIUM 100 MG: 100 CAPSULE, LIQUID FILLED ORAL at 20:21

## 2024-02-21 RX ADMIN — LORATADINE 10 MG: 10 TABLET ORAL at 09:30

## 2024-02-21 RX ADMIN — GABAPENTIN 300 MG: 300 CAPSULE ORAL at 15:43

## 2024-02-21 RX ADMIN — MEMANTINE 10 MG: 5 TABLET ORAL at 20:22

## 2024-02-21 RX ADMIN — TRAZODONE HYDROCHLORIDE 100 MG: 100 TABLET ORAL at 20:20

## 2024-02-21 RX ADMIN — GABAPENTIN 300 MG: 300 CAPSULE ORAL at 20:20

## 2024-02-21 RX ADMIN — HEPARIN SODIUM 5000 UNITS: 5000 INJECTION, SOLUTION INTRAVENOUS; SUBCUTANEOUS at 09:32

## 2024-02-21 RX ADMIN — VENLAFAXINE HYDROCHLORIDE 50 MG: 50 TABLET ORAL at 05:47

## 2024-02-21 RX ADMIN — ACETAMINOPHEN 650 MG: 325 TABLET ORAL at 22:18

## 2024-02-21 RX ADMIN — AZELASTINE HYDROCHLORIDE 1 SPRAY: 137 SPRAY, METERED NASAL at 13:23

## 2024-02-21 RX ADMIN — HEPARIN SODIUM 5000 UNITS: 5000 INJECTION, SOLUTION INTRAVENOUS; SUBCUTANEOUS at 20:20

## 2024-02-21 RX ADMIN — TAMSULOSIN HYDROCHLORIDE 0.4 MG: 0.4 CAPSULE ORAL at 09:31

## 2024-02-21 RX ADMIN — GABAPENTIN 300 MG: 300 CAPSULE ORAL at 09:30

## 2024-02-21 RX ADMIN — BUDESONIDE 3 MG: 3 CAPSULE, GELATIN COATED ORAL at 13:22

## 2024-02-21 RX ADMIN — DOCUSATE SODIUM 100 MG: 100 CAPSULE, LIQUID FILLED ORAL at 09:31

## 2024-02-21 RX ADMIN — AMLODIPINE BESYLATE 5 MG: 5 TABLET ORAL at 09:31

## 2024-02-21 RX ADMIN — AZELASTINE HYDROCHLORIDE 1 SPRAY: 137 SPRAY, METERED NASAL at 20:20

## 2024-02-21 RX ADMIN — Medication 3 MG: at 21:00

## 2024-02-21 ASSESSMENT — COGNITIVE AND FUNCTIONAL STATUS - GENERAL
MOVING FROM LYING ON BACK TO SITTING ON SIDE OF FLAT BED WITH BEDRAILS: A LITTLE
MOVING TO AND FROM BED TO CHAIR: A LITTLE
TOILETING: A LITTLE
STANDING UP FROM CHAIR USING ARMS: A LITTLE
HELP NEEDED FOR BATHING: A LITTLE
TURNING FROM BACK TO SIDE WHILE IN FLAT BAD: A LITTLE
MOBILITY SCORE: 17
CLIMB 3 TO 5 STEPS WITH RAILING: A LOT
DRESSING REGULAR UPPER BODY CLOTHING: A LITTLE
DAILY ACTIVITIY SCORE: 19
DRESSING REGULAR LOWER BODY CLOTHING: A LITTLE
WALKING IN HOSPITAL ROOM: A LITTLE
PERSONAL GROOMING: A LITTLE

## 2024-02-21 ASSESSMENT — PAIN - FUNCTIONAL ASSESSMENT
PAIN_FUNCTIONAL_ASSESSMENT: 0-10
PAIN_FUNCTIONAL_ASSESSMENT: 0-10

## 2024-02-21 ASSESSMENT — PAIN SCALES - GENERAL
PAINLEVEL_OUTOF10: 2
PAINLEVEL_OUTOF10: 4
PAINLEVEL_OUTOF10: 0 - NO PAIN

## 2024-02-21 ASSESSMENT — PAIN DESCRIPTION - DESCRIPTORS
DESCRIPTORS: ACHING
DESCRIPTORS: ACHING

## 2024-02-21 NOTE — PROGRESS NOTES
"Demarco Gudino is a 83 y.o. male on day 2 of admission presenting with Weakness.    Subjective   Pt would like a referral to be sent to [facility], as his spouse is currently there.   TCSW will submit a referral to [facility] for review and acceptance. Pt will be medically ready to admit tomorrow.     **UPDATE**  Pt has been accepted to [facility] and will admit tomorrow pending discharge orders.     Objective     Physical Exam    Last Recorded Vitals  Blood pressure (!) 141/93, pulse 67, temperature 36.8 °C (98.2 °F), temperature source Oral, resp. rate 17, height 1.88 m (6' 2\"), weight 88.6 kg (195 lb 5.2 oz), SpO2 97 %.  Intake/Output last 3 Shifts:  I/O last 3 completed shifts:  In: 1775 (20 mL/kg) [I.V.:1775 (20 mL/kg)]  Out: 1731 (19.5 mL/kg) [Urine:1731 (0.5 mL/kg/hr)]  Weight: 88.6 kg     Relevant Results                             Assessment/Plan   Principal Problem:    Weakness               JAYLENE Jones      "

## 2024-02-21 NOTE — PROGRESS NOTES
Physical Therapy    Physical Therapy Treatment    Patient Name: Demarco Gudino  MRN: [id number]  Today's Date: 2/21/2024  Time Calculation  Start Time: 1345  Stop Time: 1408  Time Calculation (min): 23 min       Assessment/Plan   PT Assessment  PT Assessment Results: Decreased strength, Decreased endurance, Impaired balance, Decreased mobility, Decreased safety awareness, Impaired vision, Impaired judgement, Impaired sensation, Pain  Rehab Prognosis: Good  Evaluation/Treatment Tolerance: Patient limited by fatigue, Patient tolerated treatment well  Barriers to Participation: Comorbidities  End of Session Communication: Bedside nurse  Assessment Comment: Patient with improved activity tolerance this date but reports fatigue at end of session. Patient fatigues with gait distance and is at risk for falls.  End of Session Patient Position: Up in chair, Alarm on     PT Plan  Treatment/Interventions: Bed mobility, Transfer training, Gait training, Endurance training, Therapeutic activity, Postural re-education  PT Plan: Skilled PT  PT Frequency: 4 times per week  PT Discharge Recommendations: Moderate intensity level of continued care  Equipment Recommended upon Discharge: Wheeled walker  PT Recommended Transfer Status: Assist x1, Assistive device  PT - OK to Discharge: Yes      General Visit Information:   PT  Visit  PT Received On: 02/21/24  Response to Previous Treatment: Patient with no complaints from previous session.  General  Reason for Referral: impaired mobility  Referred By: Carmen Luther  Past Medical History Relevant to Rehab: anxiety, neuropathy, dementia, depression, HLD, ANALILIA, HTN, UTI, OA, bilat TKA, Rt RCR, reverse TSA, suicidal ideation  Prior to Session Communication: Bedside nurse  Patient Position Received: Bed, 2 rail up, Alarm off, not on at start of session  Preferred Learning Style: verbal  General Comment: Pt is agreeable to PT    Subjective   Precautions:  Precautions  Hearing/Visual Limitations:  glasses  Medical Precautions: Fall precautions  Precautions Comment: Xray of the right shoulder completed indicating overly subluxed inferiorly         Objective   Pain: patient denies pain      Cognition: A,O x 3     Postural Control: forward flexed posture, cues for upright with ambulation     Extremity/Trunk Assessments: B LE grossly 3+/5 hip, knee, ankle    Activity Tolerance:  Activity Tolerance  Endurance: Tolerates 30 min exercise with multiple rests  Treatments:  Therapeutic Activity  Therapeutic Activity 1: Patient ambulates around room without device with CGA/min A x 1 and straightens bed, repositions bedside table, and sits in chair with arms at bedside. No loss of balance, intermittent cues for safety.    Bed Mobility 1  Bed Mobility 1: Supine to sitting  Level of Assistance 1: Close supervision    Ambulation/Gait Training 1  Surface 1: Level tile  Device 1: Rolling walker  Assistance 1: Contact guard  Quality of Gait 1: Wide base of support, Forward flexed posture, Soft knee(s)  Comments/Distance (ft) 1: 30 ft, 60 ft x 2 with turns with RW CGA on straightways, min A x 1 on turns. fatigues with distance. Intermittent verbal cues for safe positioning inside of base of support of RW.  Transfer 1  Transfer From 1: Bed to  Transfer to 1: Stand  Technique 1: Sit to stand  Transfer Device 1: Walker  Transfer Level of Assistance 1: Close supervision  Transfers 2  Transfer From 2: Stand to  Transfer to 2: Sit  Technique 2: Stand to sit  Transfer Device 2: Walker  Transfer Level of Assistance 2: Close supervision  Trials/Comments 2: cues for safe hand placement    Outcome Measures:  [facility] Basic Mobility  Turning from your back to your side while in a flat bed without using bedrails: A little  Moving from lying on your back to sitting on the side of a flat bed without using bedrails: A little  Moving to and from bed to chair (including a wheelchair): A little  Standing up from a chair using your arms (e.g.  wheelchair or bedside chair): A little  To walk in hospital room: A little  Climbing 3-5 steps with railing: A lot  Basic Mobility - Total Score: 17    Education Documentation  Mobility Training, taught by Ladi Mccray, PT at 2/21/2024  2:57 PM.  Learner: Patient  Readiness: Acceptance  Method: Explanation  Response: Needs Reinforcement  Comment: Education re: safe mobility, safe walker use, taking appropriate rest breaks for recovery    Education Comments  No comments found.        OP EDUCATION:       Encounter Problems       Encounter Problems (Active)       Mobility       STG - Patient will ambulate 65' w/ distant supervision and LRAD prn (Progressing)       Start:  02/20/24    Expected End:  02/27/24            Pt will tolerate at least 15 reps of at least 5 different LE exercises consistently during session  (Progressing)       Start:  02/20/24    Expected End:  02/27/24               Transfers       STG - Patient will transfer sit to and from stand w/ distant supervision, safe technique (Progressing)       Start:  02/20/24    Expected End:  02/27/24

## 2024-02-21 NOTE — PROGRESS NOTES
Demarco Gudino is a 83 y.o. male on day 2 of admission presenting with Weakness.      Subjective   Patient seen and examined. Resting in bed. States he is feeling ok. Denies any current pain. Voiding without issue. Appetite is good. Afebrile.        Objective     Last Recorded Vitals  /84 (BP Location: Right arm, Patient Position: Lying)   Pulse 73   Temp 36.8 °C (98.2 °F) (Oral)   Resp 17   Wt 88.6 kg (195 lb 5.2 oz)   SpO2 98%   Intake/Output last 3 Shifts:    Intake/Output Summary (Last 24 hours) at 2/21/2024 1141  Last data filed at 2/21/2024 1134  Gross per 24 hour   Intake 1058.33 ml   Output 1450 ml   Net -391.67 ml       Admission Weight  Weight: 88.6 kg (195 lb 5.2 oz) (02/19/24 1304)    Daily Weight  02/19/24 : 88.6 kg (195 lb 5.2 oz)    Image Results    No new imaging to review.     Physical Exam    General: Alert and oriented x3, pleasant.   Cardiac: Regular rate and rhythm, S1/S2 , no murmur.   Pulmonary: Clear to auscultation on room air.   Abdomen: Soft, round, nontender. BS +x4.   Extremities: Mild edema to the R wrist and R ankle.   Skin: No rashes or lesions.    Relevant Results    Scheduled medications  amLODIPine, 5 mg, oral, Daily  azelastine, 1 spray, Each Nostril, BID  budesonide EC, 3 mg, oral, Daily  cefTRIAXone, 1 g, intravenous, q24h  docusate sodium, 100 mg, oral, BID  gabapentin, 300 mg, oral, TID  heparin (porcine), 5,000 Units, subcutaneous, q12h  loratadine, 10 mg, oral, Daily  melatonin, 3 mg, oral, Nightly  memantine, 10 mg, oral, BID  polyethylene glycol, 17 g, oral, Daily  tamsulosin, 0.4 mg, oral, Daily  traZODone, 100 mg, oral, Nightly  venlafaxine, 50 mg, oral, Daily      Continuous medications     PRN medications  PRN medications: acetaminophen, hydrOXYzine pamoate, polyvinyl alcohol     Results for orders placed or performed during the hospital encounter of 02/19/24 (from the past 24 hour(s))   Basic Metabolic Panel   Result Value Ref Range    Glucose 112 (H) 65 - 99  mg/dL    Sodium 138 133 - 145 mmol/L    Potassium 3.6 3.4 - 5.1 mmol/L    Chloride 107 97 - 107 mmol/L    Bicarbonate 22 (L) 24 - 31 mmol/L    Urea Nitrogen 13 8 - 25 mg/dL    Creatinine 1.00 0.40 - 1.60 mg/dL    eGFR 75 >60 mL/min/1.73m*2    Calcium 8.6 8.5 - 10.4 mg/dL    Anion Gap 9 <=19 mmol/L           Assessment/Plan      Weakness / Failure to Thrive  -PT/OT recommending moderate intensity therapy.   -Denies any recent falls.   -Possibly worsened with the UTI, will monitor his response to treating.   -OOB with assist only.      UTI  -UA with leuks, urine culture pending with >100,000 enteroccocus faecalis.  -IV ceftriaxone, continue.   -Bowel regimen to avoid constipation.      Dehydration  -S/P IVF hydration, improved.   -States he has not ate or drank much at all over the past 3 days prior to admission.      Right Ankle and R Wrist Edema  -He denies any injury or falls. Mild pain in these joints. Likely from his severe OA.   -XR imaging of the wrist and R ankle with no acute fractures.   -ESR, CRP are elevated and Uric Acid is normal.   -Denies significant pain and has full ROM.      HTN  -Continue Norvasc.   -BP stable. Monitor.      HLD  -Holding statin for now with his weakness complaints.      DVT Risk  -Heparin subcutaneous.      Plan  Continue IV abx, awaiting final urine culture.   Appetite is good, stable off IVF.   PT/OT, mobilize.   Plan for SNF at [place]. Anticipate [place] tomorrow.         SERGIO Da Silva-CNP

## 2024-02-21 NOTE — NURSING NOTE
Assumed care of patient. Patient is A&Ox3 and is resting in bed watching tv. Call light in reach. Patient denies pain . Patient is on room air.

## 2024-02-21 NOTE — CARE PLAN
The patient's goals for the shift include      The clinical goals for the shift include rest    Over the shift, the patient did not make progress toward the following goals. Barriers to progression include . Recommendations to address these barriers include   Problem: Safety - Adult  Goal: Free from fall injury  Outcome: Progressing     Problem: Discharge Planning  Goal: Discharge to home or other facility with appropriate resources  Outcome: Progressing     Problem: Chronic Conditions and Co-morbidities  Goal: Patient's chronic conditions and co-morbidity symptoms are monitored and maintained or improved  Outcome: Progressing     Problem: Skin  Goal: Decreased wound size/increased tissue granulation at next dressing change  Outcome: Progressing  Goal: Participates in plan/prevention/treatment measures  Outcome: Progressing  Goal: Prevent/manage excess moisture  Outcome: Progressing  Goal: Prevent/minimize sheer/friction injuries  Outcome: Progressing  Goal: Promote/optimize nutrition  Outcome: Progressing  Goal: Promote skin healing  Outcome: Progressing     Problem: Fall/Injury  Goal: Not fall by end of shift  Outcome: Progressing  Goal: Be free from injury by end of the shift  Outcome: Progressing  Goal: Verbalize understanding of personal risk factors for fall in the hospital  Outcome: Progressing  Goal: Verbalize understanding of risk factor reduction measures to prevent injury from fall in the home  Outcome: Progressing  Goal: Use assistive devices by end of the shift  Outcome: Progressing  Goal: Pace activities to prevent fatigue by end of the shift  Outcome: Progressing     Problem: Pain  Goal: Takes deep breaths with improved pain control throughout the shift  Outcome: Progressing  Goal: Turns in bed with improved pain control throughout the shift  Outcome: Progressing  Goal: Walks with improved pain control throughout the shift  Outcome: Progressing  Goal: Performs ADL's with improved pain control  throughout shift  Outcome: Progressing  Goal: Participates in PT with improved pain control throughout the shift  Outcome: Progressing  Goal: Free from opioid side effects throughout the shift  Outcome: Progressing  Goal: Free from acute confusion related to pain meds throughout the shift  Outcome: Progressing   .

## 2024-02-22 VITALS
WEIGHT: 195.33 LBS | HEIGHT: 74 IN | RESPIRATION RATE: 16 BRPM | SYSTOLIC BLOOD PRESSURE: 121 MMHG | OXYGEN SATURATION: 99 % | TEMPERATURE: 97.9 F | HEART RATE: 84 BPM | BODY MASS INDEX: 25.07 KG/M2 | DIASTOLIC BLOOD PRESSURE: 75 MMHG

## 2024-02-22 LAB
ATRIAL RATE: 90 BPM
BACTERIA UR CULT: ABNORMAL
GLUCOSE BLD MANUAL STRIP-MCNC: 96 MG/DL (ref 74–99)
P AXIS: 81 DEGREES
P OFFSET: 199 MS
P ONSET: 130 MS
PR INTERVAL: 184 MS
Q ONSET: 222 MS
QRS COUNT: 15 BEATS
QRS DURATION: 80 MS
QT INTERVAL: 370 MS
QTC CALCULATION(BAZETT): 452 MS
QTC FREDERICIA: 423 MS
R AXIS: 86 DEGREES
T AXIS: 80 DEGREES
T OFFSET: 407 MS
VENTRICULAR RATE: 90 BPM

## 2024-02-22 PROCEDURE — 97530 THERAPEUTIC ACTIVITIES: CPT | Mod: GO

## 2024-02-22 PROCEDURE — 2500000004 HC RX 250 GENERAL PHARMACY W/ HCPCS (ALT 636 FOR OP/ED): Performed by: NURSE PRACTITIONER

## 2024-02-22 PROCEDURE — 2500000002 HC RX 250 W HCPCS SELF ADMINISTERED DRUGS (ALT 637 FOR MEDICARE OP, ALT 636 FOR OP/ED): Performed by: NURSE PRACTITIONER

## 2024-02-22 PROCEDURE — 97535 SELF CARE MNGMENT TRAINING: CPT | Mod: GO

## 2024-02-22 PROCEDURE — 2500000001 HC RX 250 WO HCPCS SELF ADMINISTERED DRUGS (ALT 637 FOR MEDICARE OP): Performed by: NURSE PRACTITIONER

## 2024-02-22 PROCEDURE — 82947 ASSAY GLUCOSE BLOOD QUANT: CPT

## 2024-02-22 RX ORDER — CIPROFLOXACIN 500 MG/1
500 TABLET ORAL 2 TIMES DAILY
Qty: 14 TABLET | Refills: 0
Start: 2024-02-22 | End: 2024-02-29

## 2024-02-22 RX ORDER — BISACODYL 10 MG/1
10 SUPPOSITORY RECTAL ONCE
Status: COMPLETED | OUTPATIENT
Start: 2024-02-22 | End: 2024-02-22

## 2024-02-22 RX ORDER — POLYETHYLENE GLYCOL 3350 17 G/17G
17 POWDER, FOR SOLUTION ORAL DAILY
Start: 2024-02-23

## 2024-02-22 RX ORDER — DOCUSATE SODIUM 100 MG/1
100 CAPSULE, LIQUID FILLED ORAL 2 TIMES DAILY
Start: 2024-02-22 | End: 2024-03-25 | Stop reason: ALTCHOICE

## 2024-02-22 RX ADMIN — MEMANTINE 10 MG: 5 TABLET ORAL at 08:45

## 2024-02-22 RX ADMIN — CEFTRIAXONE SODIUM 1 G: 1 INJECTION, SOLUTION INTRAVENOUS at 16:00

## 2024-02-22 RX ADMIN — ACETAMINOPHEN 650 MG: 325 TABLET ORAL at 12:17

## 2024-02-22 RX ADMIN — BUDESONIDE 3 MG: 3 CAPSULE, GELATIN COATED ORAL at 08:45

## 2024-02-22 RX ADMIN — HEPARIN SODIUM 5000 UNITS: 5000 INJECTION, SOLUTION INTRAVENOUS; SUBCUTANEOUS at 08:45

## 2024-02-22 RX ADMIN — POLYETHYLENE GLYCOL 3350 17 G: 17 POWDER, FOR SOLUTION ORAL at 08:45

## 2024-02-22 RX ADMIN — DOCUSATE SODIUM 100 MG: 100 CAPSULE, LIQUID FILLED ORAL at 08:45

## 2024-02-22 RX ADMIN — LORATADINE 10 MG: 10 TABLET ORAL at 08:45

## 2024-02-22 RX ADMIN — BISACODYL 10 MG: 10 SUPPOSITORY RECTAL at 15:00

## 2024-02-22 RX ADMIN — AZELASTINE HYDROCHLORIDE 1 SPRAY: 137 SPRAY, METERED NASAL at 08:46

## 2024-02-22 RX ADMIN — GABAPENTIN 300 MG: 300 CAPSULE ORAL at 08:45

## 2024-02-22 RX ADMIN — VENLAFAXINE HYDROCHLORIDE 50 MG: 50 TABLET ORAL at 07:02

## 2024-02-22 RX ADMIN — TAMSULOSIN HYDROCHLORIDE 0.4 MG: 0.4 CAPSULE ORAL at 08:45

## 2024-02-22 RX ADMIN — GABAPENTIN 300 MG: 300 CAPSULE ORAL at 14:28

## 2024-02-22 RX ADMIN — AMLODIPINE BESYLATE 5 MG: 5 TABLET ORAL at 08:45

## 2024-02-22 ASSESSMENT — COGNITIVE AND FUNCTIONAL STATUS - GENERAL
MOVING FROM LYING ON BACK TO SITTING ON SIDE OF FLAT BED WITH BEDRAILS: A LITTLE
STANDING UP FROM CHAIR USING ARMS: A LITTLE
PERSONAL GROOMING: A LITTLE
DAILY ACTIVITIY SCORE: 16
WALKING IN HOSPITAL ROOM: A LITTLE
DRESSING REGULAR UPPER BODY CLOTHING: A LITTLE
DRESSING REGULAR LOWER BODY CLOTHING: A LITTLE
TOILETING: A LOT
MOVING TO AND FROM BED TO CHAIR: A LITTLE
MOBILITY SCORE: 17
DAILY ACTIVITIY SCORE: 19
HELP NEEDED FOR BATHING: A LITTLE
CLIMB 3 TO 5 STEPS WITH RAILING: A LOT
TOILETING: A LITTLE
HELP NEEDED FOR BATHING: A LITTLE
DRESSING REGULAR UPPER BODY CLOTHING: A LITTLE
DRESSING REGULAR LOWER BODY CLOTHING: A LOT
TURNING FROM BACK TO SIDE WHILE IN FLAT BAD: A LITTLE
EATING MEALS: A LITTLE
PERSONAL GROOMING: A LITTLE

## 2024-02-22 ASSESSMENT — ACTIVITIES OF DAILY LIVING (ADL)
BATHING_LEVEL_OF_ASSISTANCE: MINIMUM ASSISTANCE
HOME_MANAGEMENT_TIME_ENTRY: 21
BATHING_WHERE_ASSESSED: SITTING SINKSIDE

## 2024-02-22 ASSESSMENT — PAIN - FUNCTIONAL ASSESSMENT
PAIN_FUNCTIONAL_ASSESSMENT: 0-10

## 2024-02-22 ASSESSMENT — PAIN SCALES - GENERAL
PAINLEVEL_OUTOF10: 4
PAINLEVEL_OUTOF10: 2
PAINLEVEL_OUTOF10: 0 - NO PAIN

## 2024-02-22 ASSESSMENT — PAIN DESCRIPTION - DESCRIPTORS: DESCRIPTORS: ACHING

## 2024-02-22 NOTE — CARE PLAN
The patient's goals for the shift include      The clinical goals for the shift include rest      Problem: Chronic Conditions and Co-morbidities  Goal: Patient's chronic conditions and co-morbidity symptoms are monitored and maintained or improved  Outcome: Progressing     Problem: Discharge Planning  Goal: Discharge to home or other facility with appropriate resources  Outcome: Progressing     Problem: Safety - Adult  Goal: Free from fall injury  Outcome: Progressing

## 2024-02-22 NOTE — DISCHARGE SUMMARY
Discharge Diagnosis  UTI, Weakness, Failure To Thrive    Issues Requiring Follow-Up    Discharge Meds     Your medication list        START taking these medications        Instructions Last Dose Given Next Dose Due   ciprofloxacin 500 mg tablet  Commonly known as: Cipro      Take 1 tablet (500 mg) by mouth 2 times a day for 7 days.       docusate sodium 100 mg capsule  Commonly known as: Colace      Take 1 capsule (100 mg) by mouth 2 times a day.       polyethylene glycol 17 gram packet  Commonly known as: Glycolax, Miralax  Start taking on: February 23, 2024      Take 17 g by mouth once daily. Do not start before February 23, 2024.              CONTINUE taking these medications        Instructions Last Dose Given Next Dose Due   Allergy Relief (loratadine) 10 mg tablet  Generic drug: loratadine      Take 1 tablet (10 mg) by mouth once daily.       amLODIPine 5 mg tablet  Commonly known as: Norvasc      Take 1 tablet (5 mg) by mouth once daily.       azelastine 137 mcg (0.1 %) nasal spray  Commonly known as: Astelin      Administer 1 spray into each nostril 2 times a day.       azelastine-fluticasone 137-50 mcg/spray nasal spray  Commonly known as: Dymista           budesonide EC 3 mg 24 hr capsule  Commonly known as: Entocort EC      Take 1 capsule (3 mg) by mouth once daily.       cholecalciferol 125 MCG (5000 UT) capsule  Commonly known as: Vitamin D-3           gabapentin 300 mg capsule  Commonly known as: Neurontin      Take 1 capsule (300 mg) by mouth 3 times a day.       melatonin 3 mg tablet           memantine 28 mg capsule,sprinkle,ER 24hr  Commonly known as: Namenda           polyvinyl alcohol 1.4 % ophthalmic solution  Commonly known as: Liquifilm Tears           simvastatin 20 mg tablet  Commonly known as: Zocor           tamsulosin 0.4 mg 24 hr capsule  Commonly known as: Flomax           traZODone 100 mg tablet  Commonly known as: Desyrel      Take 1 tablet (100 mg) by mouth once daily at bedtime.        venlafaxine 50 mg tablet  Commonly known as: Effexor                     Where to Get Your Medications        These medications were sent to [facility] - [address]  [address] [facility] [address], [facility] [address]      Phone: [phone]   Allergy Relief (loratadine) 10 mg tablet       Information about where to get these medications is not yet available    Ask your nurse or doctor about these medications  ciprofloxacin 500 mg tablet  docusate sodium 100 mg capsule  polyethylene glycol 17 gram packet         Test Results Pending At Discharge  Pending Labs       Order Current Status    Extra Urine Berrios Tube Collected (02/19/24 7406)    Urinalysis with Reflex Culture and Microscopic In process            Hospital Course   Admitted for further management. Treated UTI with IV abx. Urine culture resulted with enterococcus, susceptible to ciprofloxacin, he has been transitioned to po cipro for DC. IVF hydration was given. He has been doing better, good appetite. PT/OT recommend moderate intensity therapy. Labs and VS are stable. Plan for SNF at [place].     Case and plan was discussed with the patient, he is agreeable.   Case and plan was also discussed with my collaborating physician.     Time spent 35 minutes.     Pertinent Physical Exam At Time of Discharge  Physical Exam    Patient seen and examined. Resting in bed. States he has been eating well. No BM yet but passing a lot of gas. Afebrile.     General: Alert and oriented x3, pleasant.   Cardiac: Regular rate and rhythm, S1/S2 , no murmur.   Pulmonary: Clear to auscultation on room air.   Abdomen: Soft, round, nontender. BS +x4.   Extremities: No edema.  Skin: No rashes or lesions.      Outpatient Follow-Up  No future appointments.      Carmen Luther, APRN-CNP

## 2024-02-22 NOTE — PROGRESS NOTES
"Demarco Gudino is a 83 y.o. male on day 3 of admission presenting with Weakness.    Subjective   Pt will likely discharge to [facility] today, pending written discharge orders.     **UPDATE**  Pt has written discharge orders. TCSW requested transportation via roundtrip for approx 1645.        Objective     Physical Exam    Last Recorded Vitals  Blood pressure (!) 138/93, pulse 80, temperature 36.8 °C (98.2 °F), temperature source Oral, resp. rate 16, height 1.88 m (6' 2\"), weight 88.6 kg (195 lb 5.2 oz), SpO2 97 %.  Intake/Output last 3 Shifts:  I/O last 3 completed shifts:  In: 940 (10.6 mL/kg) [P.O.:840; IV Piggyback:100]  Out: 1850 (20.9 mL/kg) [Urine:1850 (0.6 mL/kg/hr)]  Weight: 88.6 kg     Relevant Results                             Assessment/Plan   Principal Problem:    Weakness               JAYLENE Jones      "

## 2024-02-22 NOTE — PROGRESS NOTES
Occupational Therapy    Occupational Therapy Treatment    Name: Demarco Gudino  MRN: [id number]  : 1940  Date: 24  Time Calculation  Start Time: 733  Stop Time: 804  Time Calculation (min): 31 min    Assessment:  OT Assessment: Patient is demonstrating improvements functionally. He would benefit from continued therapy in order to increase strength, balance, activity tolerance for participation and increased independence with daily tasks.  Prognosis: Good  Barriers to Discharge: Decreased caregiver support  Evaluation/Treatment Tolerance: Patient tolerated treatment well  End of Session Communication: Bedside nurse  End of Session Patient Position: Up in chair, Alarm on  Plan:  Treatment Interventions: ADL retraining, Functional transfer training, UE strengthening/ROM, Endurance training, Patient/family training, Compensatory technique education  OT Frequency: 2 times per week  OT Discharge Recommendations: Moderate intensity level of continued care  Equipment Recommended upon Discharge: Wheeled walker  OT Recommended Transfer Status: Assist of 1  OT - OK to Discharge: Yes    Subjective   Previous Visit Info:  OT Last Visit  OT Received On: 24  General:  General  Prior to Session Communication: Bedside nurse  Patient Position Received: Bed, 2 rail up, Alarm off, not on at start of session  Preferred Learning Style: verbal  General Comment: Patient cleared by nursing for therapy. Patient in bed upon arrival and agreeable to participate  Precautions:  Medical Precautions: Fall precautions  Pain Assessment:  Pain Assessment  Pain Assessment: 0-10  Pain Score: 0 - No pain     Objective   Activities of Daily Living: Feeding  Feeding Comments: patient ordered breakfast    Grooming  Grooming Level of Assistance: Contact guard, Minimum assistance  Grooming Where Assessed: Standing sinkside  Grooming Comments: brush teeth and wash face. Min A to comb back of hair while seated    UE Bathing  UE Bathing Level  of Assistance: Close supervision  UE Bathing Where Assessed: Sitting sinkside  UE Bathing Comments: UB sponge bath    LE Bathing  LE Bathing Level of Assistance: Minimum assistance  LE Bathing Where Assessed: Sitting sinkside  LE Bathing Comments: assistance to wash B feet    UE Dressing  UE Dressing Level of Assistance: Minimum assistance  UE Dressing Where Assessed: Chair  UE Dressing Comments: assistance to pull gown around back    LE Dressing  LE Dressing: Yes  Pants Level of Assistance: Minimum assistance  Sock Level of Assistance: Dependent  LE Dressing Where Assessed: Chair  LE Dressing Comments: assistance to doff/don socks. assistance to don pants over his feet    Toileting  Toileting Level of Assistance: Moderate assistance  Where Assessed: Other (Comment) (standing at 2WW)  Toileting Comments: assistane for thoroughness to wash posterior dhara area    Functional Standing Tolerance:  Functional Standing Tolerance  Time: ~5 minutes  Activity: ADLs  Functional Standing Tolerance Comments: standing sinkside, Fair balance with CGA for safety  Bed Mobility/Transfers: Bed Mobility  Bed Mobility: Yes  Bed Mobility 1  Bed Mobility 1: Supine to sitting  Level of Assistance 1: Close supervision  Bed Mobility Comments 1: head of bed elevated    Transfers  Transfer: Yes  Transfer 1  Transfer From 1: Bed to  Transfer to 1: Stand  Technique 1: Sit to stand  Transfer Device 1: Walker  Transfer Level of Assistance 1: Contact guard  Trials/Comments 1: x2 trials  Transfers 2  Transfer From 2: Chair without arms to  Transfer to 2: Stand  Technique 2: Sit to stand  Transfer Device 2: Walker  Transfer Level of Assistance 2: Contact guard  Trials/Comments 2: x2 trials    Therapy/Activity:    Therapeutic Activity  Therapeutic Activity Performed: Yes  Therapeutic Activity 1: patient completes functional mobility to and from the bathroom using 2WW with CGA for safety and Fair/Fair+ balance ntoed    Outcome Measures:  [facility] Daily  Activity  Putting on and taking off regular lower body clothing: A lot  Bathing (including washing, rinsing, drying): A little  Putting on and taking off regular upper body clothing: A little  Toileting, which includes using toilet, bedpan or urinal: A lot  Taking care of personal grooming such as brushing teeth: A little  Eating Meals: A little  Daily Activity - Total Score: 16    Education Documentation  Body Mechanics, taught by Lashawn Henry OT at 2/22/2024  9:09 AM.  Learner: Patient  Readiness: Acceptance  Method: Explanation, Demonstration  Response: Needs Reinforcement, Verbalizes Understanding    Precautions, taught by Lashawn Henry OT at 2/22/2024  9:09 AM.  Learner: Patient  Readiness: Acceptance  Method: Explanation, Demonstration  Response: Needs Reinforcement, Verbalizes Understanding    ADL Training, taught by Lashawn Henry OT at 2/22/2024  9:09 AM.  Learner: Patient  Readiness: Acceptance  Method: Explanation, Demonstration  Response: Needs Reinforcement, Verbalizes Understanding    Education Comments  No comments found.      Goals:  Encounter Problems       Encounter Problems (Active)       OT Goals       ADLs (Progressing)       Start:  02/20/24    Expected End:  03/21/24       Patient will complete ADL tasks with Mod I, using AE as needed, in order to increase patient's safety and independence with self-care tasks.         Functional Transfers (Progressing)       Start:  02/20/24    Expected End:  03/21/24       Patient will complete functional transfers with Mod I in order to increase patient's safety and independence with daily tasks.         B UE Strengthening (Progressing)       Start:  02/20/24    Expected End:  03/21/24       Patient will increase B UE strength to 4-/5 for functional transfers.         Functional Mobility (Progressing)       Start:  02/20/24    Expected End:  03/21/24       Patient will demonstrate the ability to complete item retrieval and functional mobility with Mod  I in order to increase patient's safety and independence with daily tasks.

## 2024-03-20 ENCOUNTER — TELEPHONE (OUTPATIENT)
Dept: PRIMARY CARE | Facility: CLINIC | Age: 84
End: 2024-03-20
Payer: COMMERCIAL

## 2024-03-20 RX ORDER — FUROSEMIDE 40 MG/1
40 TABLET ORAL DAILY
COMMUNITY
End: 2024-03-25 | Stop reason: SDUPTHER

## 2024-03-22 ENCOUNTER — TELEPHONE (OUTPATIENT)
Dept: PRIMARY CARE | Facility: CLINIC | Age: 84
End: 2024-03-22
Payer: COMMERCIAL

## 2024-03-22 RX ORDER — ARTIFICIAL TEARS 1; 2; 3 MG/ML; MG/ML; MG/ML
1 SOLUTION/ DROPS OPHTHALMIC 4 TIMES DAILY PRN
COMMUNITY

## 2024-03-25 ENCOUNTER — DOCUMENTATION (OUTPATIENT)
Dept: PRIMARY CARE | Facility: CLINIC | Age: 84
End: 2024-03-25
Payer: COMMERCIAL

## 2024-03-25 ENCOUNTER — OFFICE VISIT (OUTPATIENT)
Dept: PRIMARY CARE | Facility: CLINIC | Age: 84
End: 2024-03-25
Payer: MEDICARE

## 2024-03-25 ENCOUNTER — TELEPHONE (OUTPATIENT)
Dept: PRIMARY CARE | Facility: CLINIC | Age: 84
End: 2024-03-25
Payer: COMMERCIAL

## 2024-03-25 VITALS
DIASTOLIC BLOOD PRESSURE: 94 MMHG | SYSTOLIC BLOOD PRESSURE: 156 MMHG | OXYGEN SATURATION: 95 % | TEMPERATURE: 97.8 F | HEART RATE: 73 BPM

## 2024-03-25 DIAGNOSIS — I10 ESSENTIAL HYPERTENSION, BENIGN: ICD-10-CM

## 2024-03-25 DIAGNOSIS — I71.40 ABDOMINAL AORTIC ANEURYSM (AAA) WITHOUT RUPTURE, UNSPECIFIED PART (CMS-HCC): ICD-10-CM

## 2024-03-25 DIAGNOSIS — J31.0 CHRONIC RHINITIS: ICD-10-CM

## 2024-03-25 DIAGNOSIS — K52.9 COLITIS: ICD-10-CM

## 2024-03-25 DIAGNOSIS — M15.9 PRIMARY OSTEOARTHRITIS INVOLVING MULTIPLE JOINTS: Primary | ICD-10-CM

## 2024-03-25 DIAGNOSIS — F03.90 DEMENTIA, UNSPECIFIED DEMENTIA SEVERITY, UNSPECIFIED DEMENTIA TYPE, UNSPECIFIED WHETHER BEHAVIORAL, PSYCHOTIC, OR MOOD DISTURBANCE OR ANXIETY (MULTI): ICD-10-CM

## 2024-03-25 DIAGNOSIS — G62.9 NEUROPATHY: ICD-10-CM

## 2024-03-25 DIAGNOSIS — F33.2 MAJOR DEPRESSIVE DISORDER, RECURRENT SEVERE WITHOUT PSYCHOTIC FEATURES (MULTI): ICD-10-CM

## 2024-03-25 DIAGNOSIS — E55.9 VITAMIN D DEFICIENCY: ICD-10-CM

## 2024-03-25 DIAGNOSIS — E78.5 HYPERLIPIDEMIA, UNSPECIFIED HYPERLIPIDEMIA TYPE: ICD-10-CM

## 2024-03-25 DIAGNOSIS — N40.1 BENIGN PROSTATIC HYPERPLASIA WITH LOWER URINARY TRACT SYMPTOMS, SYMPTOM DETAILS UNSPECIFIED: ICD-10-CM

## 2024-03-25 PROCEDURE — 1125F AMNT PAIN NOTED PAIN PRSNT: CPT | Performed by: NURSE PRACTITIONER

## 2024-03-25 PROCEDURE — 3077F SYST BP >= 140 MM HG: CPT | Performed by: NURSE PRACTITIONER

## 2024-03-25 PROCEDURE — 1159F MED LIST DOCD IN RCRD: CPT | Performed by: NURSE PRACTITIONER

## 2024-03-25 PROCEDURE — 99349 HOME/RES VST EST MOD MDM 40: CPT | Performed by: NURSE PRACTITIONER

## 2024-03-25 PROCEDURE — 3080F DIAST BP >= 90 MM HG: CPT | Performed by: NURSE PRACTITIONER

## 2024-03-25 PROCEDURE — 1160F RVW MEDS BY RX/DR IN RCRD: CPT | Performed by: NURSE PRACTITIONER

## 2024-03-25 RX ORDER — MELOXICAM 7.5 MG/1
7.5 TABLET ORAL DAILY
Qty: 30 TABLET | Refills: 11 | Status: SHIPPED | OUTPATIENT
Start: 2024-03-25 | End: 2025-03-25

## 2024-03-25 RX ORDER — GABAPENTIN 300 MG/1
300 CAPSULE ORAL 3 TIMES DAILY
Qty: 90 CAPSULE | Refills: 5 | Status: SHIPPED | OUTPATIENT
Start: 2024-03-25

## 2024-03-25 RX ORDER — LORATADINE 10 MG/1
10 TABLET ORAL DAILY
Qty: 30 TABLET | Refills: 11 | Status: SHIPPED | OUTPATIENT
Start: 2024-03-25 | End: 2025-03-25

## 2024-03-25 RX ORDER — AZELASTINE 1 MG/ML
1 SPRAY, METERED NASAL 2 TIMES DAILY
Qty: 30 ML | Refills: 11 | Status: SHIPPED | OUTPATIENT
Start: 2024-03-25

## 2024-03-25 RX ORDER — BUDESONIDE 3 MG/1
3 CAPSULE, COATED PELLETS ORAL DAILY
Qty: 30 CAPSULE | Refills: 11 | Status: SHIPPED | OUTPATIENT
Start: 2024-03-25

## 2024-03-25 RX ORDER — SIMVASTATIN 20 MG/1
20 TABLET, FILM COATED ORAL DAILY
Qty: 30 TABLET | Refills: 11 | Status: SHIPPED | OUTPATIENT
Start: 2024-03-25 | End: 2025-03-25

## 2024-03-25 RX ORDER — TRAZODONE HYDROCHLORIDE 100 MG/1
100 TABLET ORAL NIGHTLY
Qty: 30 TABLET | Refills: 11 | Status: SHIPPED | OUTPATIENT
Start: 2024-03-25

## 2024-03-25 RX ORDER — TAMSULOSIN HYDROCHLORIDE 0.4 MG/1
0.4 CAPSULE ORAL DAILY
Qty: 30 CAPSULE | Refills: 11 | Status: SHIPPED | OUTPATIENT
Start: 2024-03-25 | End: 2025-03-25

## 2024-03-25 RX ORDER — VENLAFAXINE 50 MG/1
50 TABLET ORAL
Qty: 30 TABLET | Refills: 11 | Status: SHIPPED | OUTPATIENT
Start: 2024-03-25 | End: 2025-03-25

## 2024-03-25 RX ORDER — TALC
3 POWDER (GRAM) TOPICAL NIGHTLY
Qty: 30 TABLET | Refills: 11 | Status: SHIPPED | OUTPATIENT
Start: 2024-03-25 | End: 2025-03-25

## 2024-03-25 RX ORDER — AMLODIPINE BESYLATE 5 MG/1
5 TABLET ORAL DAILY
Qty: 30 TABLET | Refills: 11 | Status: SHIPPED | OUTPATIENT
Start: 2024-03-25

## 2024-03-25 RX ORDER — MEMANTINE HYDROCHLORIDE 28 MG/1
28 CAPSULE, EXTENDED RELEASE ORAL DAILY
Qty: 30 CAPSULE | Refills: 11 | Status: SHIPPED | OUTPATIENT
Start: 2024-03-25 | End: 2025-03-25

## 2024-03-25 RX ORDER — CHOLECALCIFEROL (VITAMIN D3) 125 MCG
125 CAPSULE ORAL DAILY
Qty: 30 CAPSULE | Refills: 11 | Status: SHIPPED | OUTPATIENT
Start: 2024-03-25 | End: 2025-03-25

## 2024-03-25 RX ORDER — FUROSEMIDE 40 MG/1
40 TABLET ORAL DAILY
Qty: 30 TABLET | Refills: 11 | Status: SHIPPED | OUTPATIENT
Start: 2024-03-25 | End: 2025-03-25

## 2024-03-25 ASSESSMENT — ENCOUNTER SYMPTOMS
DIARRHEA: 0
HEADACHES: 0
COUGH: 0
SHORTNESS OF BREATH: 0
WHEEZING: 0
TROUBLE SWALLOWING: 0
SINUS PRESSURE: 0
SINUS PAIN: 0
NAUSEA: 0
ARTHRALGIAS: 1
PALPITATIONS: 0
FATIGUE: 0
ABDOMINAL DISTENTION: 0
SEIZURES: 0
FEVER: 0
BACK PAIN: 1
ADENOPATHY: 0
WOUND: 0
CONSTIPATION: 0
EYE PAIN: 0
DIFFICULTY URINATING: 0
MYALGIAS: 0
CHILLS: 0
BRUISES/BLEEDS EASILY: 0
WEAKNESS: 1
ABDOMINAL PAIN: 0
JOINT SWELLING: 1
COLOR CHANGE: 0
RHINORRHEA: 1

## 2024-03-25 ASSESSMENT — PAIN SCALES - GENERAL: PAINLEVEL: 6

## 2024-03-25 NOTE — PROGRESS NOTES
[facility] CM in home visit with provider. Recently discharged from SNF. Decided he did not want long term care as he had previously stated. Lives alone. Independent with ADL's. Manages medications. Prefers prefilled pill packs from [facility]. Currently receiving HHC from [facility]. Referral given to rheumatology for right wrist arthritis. Also reported left shoulder pain. Information provided for [employer] [facility]. Remains active with [employer]. 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 Will follow up with phone call.

## 2024-03-25 NOTE — ASSESSMENT & PLAN NOTE
Stable. Patient continues to follow with Nithya Fitch CNP with [facility] mental health services

## 2024-03-25 NOTE — PROGRESS NOTES
"Subjective   Patient ID: Demarco Gudino is a 83 y.o. male who presents for Post-acute Follow-up.    HPI   Patient seen up in recliner in no acute distress. Present for today's visit is Brenda Jacobs RN case manager with the [facility] team. He was recently discharged home with [facility] services through [facility]. Patient resides in a single family home by himself. He has a limited support system as he wife resides in a LTC facility and his only other relative lives 3 hours away. Patient does not drive and has his medications delivered. He is also active with [employer] and a . Patient ambulates independently and denies any recent falls. Medications reviewed with patient    UTI, Weakness, Failure To Thrive - Patient was hospitalized last month. Per hospital records, \" Admitted for further management. Treated UTI with IV abx. Urine culture resulted with enterococcus, susceptible to ciprofloxacin, he has been transitioned to po cipro for DC. IVF hydration was given. He has been doing better, good appetite. PT/OT recommend moderate intensity therapy. Labs and VS are stable. Plan for SNF at [place]\". Patient states that he continues to feel safe at home by himself    Depression/ Dementia/ Suicide attempt Hx - Stable. Patient denies any current SI and continues to follow routinely with [facility] mental health services    AAA - Surgical repair was originally recommended in 09/2022. Patient has not yet followed with a vascular surgeon and has no intentions to at this time.    Arthritis of multiple joints - Patient admits to worsening pain/ inflammation in his right hand and decreased mobility in his left shoulder. Minimal work-up completed during most recent hospitalization with no acute findings      Current Outpatient Medications:     Allergy Relief, loratadine, 10 mg tablet, Take 1 tablet (10 mg) by mouth once daily., Disp: 30 tablet, Rfl: 11    amLODIPine (Norvasc) 5 mg tablet, Take 1 " tablet (5 mg) by mouth once daily., Disp: 30 tablet, Rfl: 11    artificial tears, dextran-hypomel-glycerin, 0.1-0.3-0.2 % ophthalmic solution, Administer 1 drop into affected eye(s) 4 times a day as needed for dry eyes., Disp: , Rfl:     azelastine (Astelin) 137 mcg (0.1 %) nasal spray, Administer 1 spray into each nostril 2 times a day., Disp: 30 mL, Rfl: 11    azelastine-fluticasone (Dymista) 137-50 mcg/spray nasal spray, Administer 1 spray into each nostril 2 times a day., Disp: , Rfl:     budesonide EC (Entocort EC) 3 mg 24 hr capsule, Take 1 capsule (3 mg) by mouth once daily., Disp: 30 capsule, Rfl: 11    cholecalciferol (Vitamin D-3) 125 MCG (5000 UT) capsule, Take 1 capsule (125 mcg) by mouth once daily., Disp: 30 capsule, Rfl: 11    furosemide (Lasix) 40 mg tablet, Take 1 tablet (40 mg) by mouth once daily., Disp: 30 tablet, Rfl: 11    gabapentin (Neurontin) 300 mg capsule, Take 1 capsule (300 mg) by mouth 3 times a day., Disp: 90 capsule, Rfl: 5    melatonin 3 mg tablet, Take 1 tablet (3 mg) by mouth once daily at bedtime., Disp: 30 tablet, Rfl: 11    meloxicam (Mobic) 7.5 mg tablet, Take 1 tablet (7.5 mg) by mouth once daily., Disp: 30 tablet, Rfl: 11    memantine (Namenda) 28 mg capsule,sprinkle,ER 24hr, Take 1 capsule (28 mg) by mouth once daily., Disp: 30 capsule, Rfl: 11    polyethylene glycol (Glycolax, Miralax) 17 gram packet, Take 17 g by mouth once daily. Do not start before February 23, 2024., Disp: , Rfl:     polyvinyl alcohol (Liquifilm Tears) 1.4 % ophthalmic solution, Administer 2 drops into both eyes 3 times a day as needed., Disp: , Rfl:     simvastatin (Zocor) 20 mg tablet, Take 1 tablet (20 mg) by mouth once daily., Disp: 30 tablet, Rfl: 11    tamsulosin (Flomax) 0.4 mg 24 hr capsule, Take 1 capsule (0.4 mg) by mouth once daily., Disp: 30 capsule, Rfl: 11    traZODone (Desyrel) 100 mg tablet, Take 1 tablet (100 mg) by mouth once daily at bedtime., Disp: 30 tablet, Rfl: 11    venlafaxine  (Effexor) 50 mg tablet, Take 1 tablet (50 mg) by mouth once daily in the morning. Take before meals., Disp: 30 tablet, Rfl: 11     Review of Systems   Constitutional:  Negative for chills, fatigue and fever.   HENT:  Positive for rhinorrhea. Negative for dental problem, sinus pressure, sinus pain and trouble swallowing.         Positive for chronic sinus issues   Eyes:  Negative for pain and visual disturbance.   Respiratory:  Negative for cough, shortness of breath and wheezing.    Cardiovascular:  Negative for chest pain, palpitations and leg swelling.   Gastrointestinal:  Negative for abdominal distention, abdominal pain, constipation, diarrhea and nausea.   Endocrine: Negative for cold intolerance and heat intolerance.   Genitourinary:  Negative for difficulty urinating.   Musculoskeletal:  Positive for arthralgias, back pain and joint swelling. Negative for gait problem and myalgias.   Skin:  Negative for color change, pallor, rash and wound.   Allergic/Immunologic: Negative for environmental allergies and food allergies.   Neurological:  Positive for weakness. Negative for seizures and headaches.        See HPI   Hematological:  Negative for adenopathy. Does not bruise/bleed easily.   Psychiatric/Behavioral:  Negative for behavioral problems.         Positive for depression and dementia history   All other systems reviewed and are negative.      Objective   BP (!) 156/94   Pulse 73   Temp 36.6 °C (97.8 °F)   SpO2 95%     Physical Exam  Constitutional:       General: He is not in acute distress.     Appearance: He is not toxic-appearing.   HENT:      Head: Normocephalic and atraumatic.      Nose: Nose normal.      Mouth/Throat:      Mouth: Mucous membranes are moist.      Pharynx: Oropharynx is clear.   Eyes:      Extraocular Movements: Extraocular movements intact.      Conjunctiva/sclera: Conjunctivae normal.      Pupils: Pupils are equal, round, and reactive to light.   Cardiovascular:      Rate and  Rhythm: Normal rate and regular rhythm.      Pulses: Normal pulses.      Heart sounds: Normal heart sounds. No murmur heard.  Pulmonary:      Effort: Pulmonary effort is normal.      Breath sounds: Normal breath sounds. No wheezing.   Abdominal:      General: Abdomen is flat. Bowel sounds are normal.      Palpations: Abdomen is soft.   Musculoskeletal:         General: Swelling present.      Cervical back: Neck supple.      Right lower leg: No edema.      Left lower leg: No edema.      Comments: Right hand/ wrist edema secondary to severe arthritis. Limited ROM to left shoulder (chronic) Ambulatory with assistive device   Skin:     General: Skin is warm and dry.   Neurological:      General: No focal deficit present.      Mental Status: He is alert.      Cranial Nerves: No cranial nerve deficit.      Motor: Weakness present.      Gait: Gait abnormal.      Comments: Positive for neuropathy bilateral hands and feet   Psychiatric:         Mood and Affect: Mood normal.         Behavior: Behavior normal.         Thought Content: Thought content normal.         Judgment: Judgment normal.         Assessment/Plan   Problem List Items Addressed This Visit             ICD-10-CM    Neuropathy G62.9     Patient to continue with current gabapentin dosing. He is to continue with comfort measures and supportive care         Relevant Medications    gabapentin (Neurontin) 300 mg capsule    AAA (abdominal aortic aneurysm) without rupture (CMS/[facility]) I71.40     Patient to continue current medication regiment. Vascular surgery follow up has been recommended but patient continues to decline as he is not interested in pursuing surgery         Benign prostatic hyperplasia N40.0    Relevant Medications    tamsulosin (Flomax) 0.4 mg 24 hr capsule    Chronic rhinitis J31.0    Relevant Medications    Allergy Relief, loratadine, 10 mg tablet    azelastine (Astelin) 137 mcg (0.1 %) nasal spray    Dementia (CMS/[facility]) F03.90    Relevant Medications     melatonin 3 mg tablet    memantine (Namenda) 28 mg capsule,sprinkle,ER 24hr    Vitamin D deficiency E55.9    Relevant Medications    cholecalciferol (Vitamin D-3) 125 MCG (5000 UT) capsule    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 Rheumatology referral placed for additional treatment and recommendations. Patient to continue with [facility] services         Relevant Medications    meloxicam (Mobic) 7.5 mg tablet    Other Relevant Orders    Referral to Rheumatology    Major depressive disorder, recurrent severe without psychotic features (CMS/HCC) F33.2     Stable. Patient continues to follow with Nithya Fitch CNP with [facility] mental health services         Relevant Medications    traZODone (Desyrel) 100 mg tablet    venlafaxine (Effexor) 50 mg tablet    Hyperlipidemia, unspecified E78.5    Relevant Medications    simvastatin (Zocor) 20 mg tablet    Essential hypertension, benign I10    Relevant Medications    amLODIPine (Norvasc) 5 mg tablet    furosemide (Lasix) 40 mg tablet     Other Visit Diagnoses         Codes    Colitis     K52.9    Relevant Medications    budesonide EC (Entocort EC) 3 mg 24 hr capsule             SERGIO Blanchard-CNP

## 2024-03-25 NOTE — ASSESSMENT & PLAN NOTE
Will initiate low dose Mobic due to persistent issues with edema and pain. Rheumatology referral placed for additional treatment and recommendations. Patient to continue with [facility] services

## 2024-04-30 ENCOUNTER — TELEPHONE (OUTPATIENT)
Dept: PRIMARY CARE | Facility: CLINIC | Age: 84
End: 2024-04-30
Payer: COMMERCIAL

## 2024-05-01 ENCOUNTER — DOCUMENTATION (OUTPATIENT)
Dept: PRIMARY CARE | Facility: CLINIC | Age: 84
End: 2024-05-01
Payer: COMMERCIAL

## 2024-05-01 ENCOUNTER — OFFICE VISIT (OUTPATIENT)
Dept: PRIMARY CARE | Facility: CLINIC | Age: 84
End: 2024-05-01
Payer: MEDICARE

## 2024-05-01 VITALS
DIASTOLIC BLOOD PRESSURE: 82 MMHG | SYSTOLIC BLOOD PRESSURE: 142 MMHG | OXYGEN SATURATION: 99 % | RESPIRATION RATE: 23 BRPM | TEMPERATURE: 98.4 F | HEART RATE: 83 BPM

## 2024-05-01 DIAGNOSIS — E78.5 HYPERLIPIDEMIA, UNSPECIFIED HYPERLIPIDEMIA TYPE: ICD-10-CM

## 2024-05-01 DIAGNOSIS — N17.9 AKI (ACUTE KIDNEY INJURY) (CMS-HCC): ICD-10-CM

## 2024-05-01 DIAGNOSIS — I71.40 ABDOMINAL AORTIC ANEURYSM (AAA) WITHOUT RUPTURE, UNSPECIFIED PART (CMS-HCC): Primary | ICD-10-CM

## 2024-05-01 DIAGNOSIS — M06.9 RHEUMATOID ARTHRITIS INVOLVING MULTIPLE SITES, UNSPECIFIED WHETHER RHEUMATOID FACTOR PRESENT (MULTI): ICD-10-CM

## 2024-05-01 DIAGNOSIS — F33.2 MAJOR DEPRESSIVE DISORDER, RECURRENT SEVERE WITHOUT PSYCHOTIC FEATURES (MULTI): ICD-10-CM

## 2024-05-01 DIAGNOSIS — F03.90 DEMENTIA, UNSPECIFIED DEMENTIA SEVERITY, UNSPECIFIED DEMENTIA TYPE, UNSPECIFIED WHETHER BEHAVIORAL, PSYCHOTIC, OR MOOD DISTURBANCE OR ANXIETY (MULTI): ICD-10-CM

## 2024-05-01 DIAGNOSIS — I10 ESSENTIAL HYPERTENSION, BENIGN: ICD-10-CM

## 2024-05-01 DIAGNOSIS — R60.0 BILATERAL LEG EDEMA: ICD-10-CM

## 2024-05-01 DIAGNOSIS — G62.9 NEUROPATHY: ICD-10-CM

## 2024-05-01 PROBLEM — Z98.890 HISTORY OF REVERSE TOTAL REPLACEMENT OF RIGHT SHOULDER JOINT: Status: ACTIVE | Noted: 2023-08-01

## 2024-05-01 PROBLEM — T84.9XXA DISORDER OF JOINT PROSTHESIS (CMS-HCC): Status: RESOLVED | Noted: 2024-05-01 | Resolved: 2024-05-01

## 2024-05-01 PROBLEM — J96.00 ACUTE RESPIRATORY FAILURE (MULTI): Status: RESOLVED | Noted: 2022-01-13 | Resolved: 2024-05-01

## 2024-05-01 PROCEDURE — 99349 HOME/RES VST EST MOD MDM 40: CPT

## 2024-05-01 PROCEDURE — 1125F AMNT PAIN NOTED PAIN PRSNT: CPT

## 2024-05-01 PROCEDURE — 3079F DIAST BP 80-89 MM HG: CPT

## 2024-05-01 PROCEDURE — 1160F RVW MEDS BY RX/DR IN RCRD: CPT

## 2024-05-01 PROCEDURE — 1159F MED LIST DOCD IN RCRD: CPT

## 2024-05-01 PROCEDURE — 3077F SYST BP >= 140 MM HG: CPT

## 2024-05-01 RX ORDER — POTASSIUM CHLORIDE 750 MG/1
10 TABLET, EXTENDED RELEASE ORAL
COMMUNITY

## 2024-05-01 ASSESSMENT — ENCOUNTER SYMPTOMS
BLOOD IN STOOL: 0
FLANK PAIN: 0
NAUSEA: 0
SLEEP DISTURBANCE: 0
NERVOUS/ANXIOUS: 0
SHORTNESS OF BREATH: 0
APPETITE CHANGE: 0
UNEXPECTED WEIGHT CHANGE: 0
TREMORS: 0
ACTIVITY CHANGE: 0
BRUISES/BLEEDS EASILY: 0
DIAPHORESIS: 0
CHEST TIGHTNESS: 0
CONFUSION: 0
SPEECH DIFFICULTY: 0
HEMATURIA: 0
COUGH: 0
DYSURIA: 0
AGITATION: 0
RHINORRHEA: 0
WOUND: 0
CHILLS: 0
VOMITING: 0
SEIZURES: 0
FREQUENCY: 0
LIGHT-HEADEDNESS: 0
CONSTIPATION: 0
HEADACHES: 0
ABDOMINAL DISTENTION: 0
ARTHRALGIAS: 1
WHEEZING: 0
DIZZINESS: 0
NUMBNESS: 0
FATIGUE: 0
FEVER: 0
CHOKING: 0
DIARRHEA: 0
SORE THROAT: 0

## 2024-05-01 ASSESSMENT — PAIN SCALES - GENERAL: PAINLEVEL: 4

## 2024-05-01 NOTE — PROGRESS NOTES
"Subjective   Patient ID: Demarco Gudino is a 83 y.o. male who presents for Follow-up (Rheumatoid arthritis, chronic medical conditions).    HPI   PMH: HTN, HLD, AAA, PVD, rheumatoid arthritis, neuropathy, depression/anxiety and dementia.    Patient seen up in recliner in no acute distress. Present for today's visit is Brenda Jacobs RN case manager with the [facility] team. He is active with [facility] services through [facility], receiving PT weekly. Patient resides in a single family home by himself. He has a limited support system as he wife resides in a LTC facility and his only other relative lives 3 hours away. Patient does not drive and has his medications delivered. He is also active with [employer] [facility] and a . Patient ambulates independently and denies any recent falls. Medications reviewed with patient     Depression/ Dementia/ Suicide attempt Hx - Stable. Patient denies any current SI and continues to follow routinely with [facility] health services     AAA - Surgical repair was originally recommended in 09/2022. Patient has not yet followed with a vascular surgeon and has no intentions to at this time.     Arthritis of multiple joints - Patient admits to worsening pain/ inflammation in his right hand and decreased mobility in his left shoulder. Hospital admission March 26-28 admission for RA flare.  \"Assessment & Plan: Ortho saw  -Recommend DC on colchicine  -outpt follow-up with orthopedic hand surgery  -prednisone taper  dischargte home per pt request. To follow closely with pcp. To snf if unable to get better at home. Fall precautions.\"   Appointment scheduled on 6/18/2024 1:00 PM with Shaun Xavier MD (Rheumatology)      Home Visit:  Medically necessary due to dementia/cognitive impairment, unsteady gait/poor balance, shortness of breath with minimal exertion, Illness or condition that results in activity limitation or restriction that impacts the ability to leave " NOTIFICATION OF INPATIENT ADMISSION   AUTHORIZATION REQUEST   SERVICING FACILITY:   [facility]  [address] [facility], [address] [place] [address], [address]  Tax ID: [id number]  NPI: [id number]   ATTENDING PROVIDER:  Attending Name and NPI#: Sam Phillip [[id number]]  Address: [address] [place] [address], [address]  Phone: [phone]     ADMISSION INFORMATION:  Place of Service: [facility] [place] [address]  Place of Service Code: 21  Inpatient Admission Date/Time: 5/5/23 10:10 AM  Discharge Date/Time: No discharge date for patient encounter  Admitting Diagnosis Code/Description:  Ureterolithiasis [N20 1]  Flank pain [R10 9]  Right ureteral stone [N20 1]  Obstruction of left ureteropelvic junction (UPJ) due to stone [N20 1]     UTILIZATION REVIEW CONTACT:  Alissa Lopez, Utilization   Network Utilization Review Department  Phone: [phone]  Fax [phone]  Email: [email or web address]  Contact for approvals/pending authorizations, clinical reviews, and discharge  PHYSICIAN ADVISORY SERVICES:  Medical Necessity Denial & [facility] Review  Phone: [phone]  Fax: [phone]  Email: [email or web address] such as: equipment and /or human assistance needed to safely leave the home, patient has limited support systems to help the patient attend office visits, the office visit would require excessive physical effort or pain for the patient.       Current Outpatient Medications:     Allergy Relief, loratadine, 10 mg tablet, Take 1 tablet (10 mg) by mouth once daily., Disp: 30 tablet, Rfl: 11    amLODIPine (Norvasc) 5 mg tablet, Take 1 tablet (5 mg) by mouth once daily., Disp: 30 tablet, Rfl: 11    artificial tears, dextran-hypomel-glycerin, 0.1-0.3-0.2 % ophthalmic solution, Administer 1 drop into affected eye(s) 4 times a day as needed for dry eyes., Disp: , Rfl:     azelastine (Astelin) 137 mcg (0.1 %) nasal spray, Administer 1 spray into each nostril 2 times a day., Disp: 30 mL, Rfl: 11    budesonide EC (Entocort EC) 3 mg 24 hr capsule, Take 1 capsule (3 mg) by mouth once daily., Disp: 30 capsule, Rfl: 11    cholecalciferol (Vitamin D-3) 125 MCG (5000 UT) capsule, Take 1 capsule (125 mcg) by mouth once daily., Disp: 30 capsule, Rfl: 11    furosemide (Lasix) 40 mg tablet, Take 1 tablet (40 mg) by mouth once daily., Disp: 30 tablet, Rfl: 11    gabapentin (Neurontin) 300 mg capsule, Take 1 capsule (300 mg) by mouth 3 times a day., Disp: 90 capsule, Rfl: 5    melatonin 3 mg tablet, Take 1 tablet (3 mg) by mouth once daily at bedtime., Disp: 30 tablet, Rfl: 11    meloxicam (Mobic) 7.5 mg tablet, Take 1 tablet (7.5 mg) by mouth once daily., Disp: 30 tablet, Rfl: 11    memantine (Namenda) 28 mg capsule,sprinkle,ER 24hr, Take 1 capsule (28 mg) by mouth once daily., Disp: 30 capsule, Rfl: 11    polyethylene glycol (Glycolax, Miralax) 17 gram packet, Take 17 g by mouth once daily. Do not start before February 23, 2024., Disp: , Rfl:     polyvinyl alcohol (Liquifilm Tears) 1.4 % ophthalmic solution, Administer 2 drops into both eyes 3 times a day as needed., Disp: , Rfl:     potassium chloride CR 10 mEq ER tablet, Take 1  tablet (10 mEq) by mouth once daily., Disp: , Rfl:     simvastatin (Zocor) 20 mg tablet, Take 1 tablet (20 mg) by mouth once daily., Disp: 30 tablet, Rfl: 11    tamsulosin (Flomax) 0.4 mg 24 hr capsule, Take 1 capsule (0.4 mg) by mouth once daily., Disp: 30 capsule, Rfl: 11    traZODone (Desyrel) 100 mg tablet, Take 1 tablet (100 mg) by mouth once daily at bedtime., Disp: 30 tablet, Rfl: 11    venlafaxine (Effexor) 50 mg tablet, Take 1 tablet (50 mg) by mouth once daily in the morning. Take before meals., Disp: 30 tablet, Rfl: 11     Review of Systems   Constitutional:  Negative for activity change, appetite change, chills, diaphoresis, fatigue, fever and unexpected weight change.   HENT:  Negative for congestion, dental problem, rhinorrhea and sore throat.    Eyes:  Negative for visual disturbance.   Respiratory:  Negative for cough, choking, chest tightness, shortness of breath and wheezing.    Gastrointestinal:  Negative for abdominal distention, blood in stool, constipation, diarrhea, nausea and vomiting.   Genitourinary:  Negative for dysuria, flank pain, frequency and hematuria.   Musculoskeletal:  Positive for arthralgias.   Skin:  Negative for rash and wound.   Neurological:  Negative for dizziness, tremors, seizures, syncope, speech difficulty, light-headedness, numbness and headaches.   Hematological:  Does not bruise/bleed easily.   Psychiatric/Behavioral:  Negative for agitation, behavioral problems, confusion, self-injury, sleep disturbance and suicidal ideas. The patient is not nervous/anxious.        Objective   /82 (BP Location: Right arm, Patient Position: Sitting, BP Cuff Size: Adult)   Pulse 83   Temp 36.9 °C (98.4 °F) (Temporal)   Resp 23   SpO2 99% Comment: room air    Physical Exam  Constitutional:       General: He is not in acute distress.     Comments: Chronically ill appearing, but improved overall since last visit per Brenda Jacobs, RN Care Manager who has been accompanying LIP  at visits.    HENT:      Head: Normocephalic.      Right Ear: External ear normal.      Left Ear: External ear normal.      Nose: No congestion or rhinorrhea.      Mouth/Throat:      Mouth: Mucous membranes are moist.   Eyes:      General: No scleral icterus.     Extraocular Movements: Extraocular movements intact.      Pupils: Pupils are equal, round, and reactive to light.   Neck:      Comments: Active ROM decreased d/t chronic stiffness and pain   Cardiovascular:      Rate and Rhythm: Normal rate and regular rhythm.      Heart sounds: No murmur heard.  Pulmonary:      Effort: Pulmonary effort is normal. No respiratory distress.      Breath sounds: No wheezing or rales.      Comments: Bilateral bases diminished  Abdominal:      General: Bowel sounds are normal.      Palpations: Abdomen is soft.      Tenderness: There is no abdominal tenderness. There is no guarding.   Musculoskeletal:      Right lower leg: Edema present.      Left lower leg: Edema present.      Comments: BLE (feet/ankles) with 2+ pitting edema, no change from last visit   Skin:     General: Skin is warm and dry.      Capillary Refill: Capillary refill takes less than 2 seconds.      Findings: No bruising, lesion or rash.   Neurological:      Mental Status: He is alert and oriented to person, place, and time. Mental status is at baseline.      Comments: Peripheral neuropathy (baseline)   Psychiatric:         Mood and Affect: Mood normal.         Behavior: Behavior normal.       Assessment/Plan   Abdominal aortic aneurysm (AAA) without rupture, unspecified part (CMS-HCC)  Hyperlipidemia, unspecified hyperlipidemia type  Essential hypertension, benign  Bilateral leg edema  Lab Results   Component Value Date    CHOL 121 (L) 07/27/2023    CHOL 143 06/21/2023    CHOL 142 01/27/2022     Lab Results   Component Value Date    HDL 62 07/27/2023    HDL 76 06/21/2023    HDL 46 01/27/2022     Lab Results   Component Value Date    LDLCALC 50 (L) 07/27/2023     LDLCALC 57 (L) 06/21/2023    LDLCALC 88 01/27/2022     Lab Results   Component Value Date    TRIG 45 07/27/2023    TRIG 50 06/21/2023    TRIG 42 01/27/2022   Chronic, stable. CT abdomen/pelvis in December 2023: Interval increase in size an infrarenal abdominal aortic aneurysm measuring 7.9 x 7.5 cm, previously measuring 7 x 6 cm in 2022. Does not follow with vascular.   -continue amlodipine 5 mg, furosemide 40 mg, simvastatin 20 mg,   ANALILIA (acute kidney injury) (CMS-[facility])  Admission March 26-28, found to have prerenal ANALILIA, improved with IVF. Creatinine normalized prior to discharge.  Nephrology consulted, recommended no ACEi/ARBs or NSAIDs.   -consider discontinuing meloxicam, although pt has chronic pain r/t RA.   -avoid nephrotoxic agents   -monitor renal function  Rheumatoid arthritis involving multiple sites, unspecified whether rheumatoid factor present (Multi)  Chronic. Recent admission for RA flare, see HPI.  -Will see rheumatologist June 16th  Neuropathy  Chronic, stable.  -continue gabapentin 300 mg TID  Major depressive disorder, recurrent severe without psychotic features (Multi)  Dementia, unspecified dementia severity, unspecified dementia type, unspecified whether behavioral, psychotic, or mood disturbance or anxiety (Multi)  Chronic, stable. Follows with [facility].  -continue medication regimen per Nithya Fitch NP    Patient is stable. Will continue with monthly [facility] NP visits.          SERGIO West-CNP

## 2024-05-01 NOTE — PROGRESS NOTES
[facility] CM in home visit with provider. Up ambulating in home. States he is doing well. Meds reviewed with provider. Receives prepackaged pill packs. Compliant with taking medications. Remains active with [facility] for therapy services. Active with [facility]. Receives [employer] and has volunteer . Stated he will be receiving non-medical homemaker to help with light housekeeping. Will follow up with phone call.

## 2024-05-07 PROBLEM — M06.9 RHEUMATOID ARTHRITIS INVOLVING MULTIPLE SITES (MULTI): Status: ACTIVE | Noted: 2024-05-07

## 2024-05-21 ENCOUNTER — TELEPHONE (OUTPATIENT)
Dept: PRIMARY CARE | Facility: CLINIC | Age: 84
End: 2024-05-21
Payer: COMMERCIAL

## 2024-05-21 NOTE — TELEPHONE ENCOUNTER
Message left on [facility] office machine, bi-lateral lower legs and feet swollen to 4+. Patient unable to put on his compression stockings, can't take off put on wraps himself.  Jair leaves inquiry regarding perhaps tubi  can be used.  Please Advise

## 2024-05-21 NOTE — TELEPHONE ENCOUNTER
Call returned to Jair at [facility] [phone], left message regarding tubi  would be ok per provider, along with inquiry regarding is home health able to supply? If not, will need measurements.

## 2024-05-28 ENCOUNTER — TELEPHONE (OUTPATIENT)
Dept: PRIMARY CARE | Facility: CLINIC | Age: 84
End: 2024-05-28
Payer: COMMERCIAL

## 2024-05-29 ENCOUNTER — OFFICE VISIT (OUTPATIENT)
Dept: PRIMARY CARE | Facility: CLINIC | Age: 84
End: 2024-05-29
Payer: MEDICARE

## 2024-05-29 VITALS
RESPIRATION RATE: 23 BRPM | TEMPERATURE: 98.9 F | SYSTOLIC BLOOD PRESSURE: 148 MMHG | OXYGEN SATURATION: 99 % | DIASTOLIC BLOOD PRESSURE: 98 MMHG | HEART RATE: 89 BPM

## 2024-05-29 DIAGNOSIS — F41.8 MIXED ANXIETY AND DEPRESSIVE DISORDER: ICD-10-CM

## 2024-05-29 DIAGNOSIS — M06.9 RHEUMATOID ARTHRITIS INVOLVING MULTIPLE SITES, UNSPECIFIED WHETHER RHEUMATOID FACTOR PRESENT (MULTI): ICD-10-CM

## 2024-05-29 DIAGNOSIS — I71.40 ABDOMINAL AORTIC ANEURYSM (AAA) WITHOUT RUPTURE, UNSPECIFIED PART (CMS-HCC): ICD-10-CM

## 2024-05-29 DIAGNOSIS — R60.0 BILATERAL LEG EDEMA: ICD-10-CM

## 2024-05-29 DIAGNOSIS — I10 ESSENTIAL HYPERTENSION, BENIGN: Primary | ICD-10-CM

## 2024-05-29 DIAGNOSIS — J31.0 CHRONIC RHINITIS: ICD-10-CM

## 2024-05-29 DIAGNOSIS — G62.9 NEUROPATHY: ICD-10-CM

## 2024-05-29 DIAGNOSIS — M15.9 PRIMARY OSTEOARTHRITIS INVOLVING MULTIPLE JOINTS: ICD-10-CM

## 2024-05-29 DIAGNOSIS — F03.90 DEMENTIA, UNSPECIFIED DEMENTIA SEVERITY, UNSPECIFIED DEMENTIA TYPE, UNSPECIFIED WHETHER BEHAVIORAL, PSYCHOTIC, OR MOOD DISTURBANCE OR ANXIETY (MULTI): ICD-10-CM

## 2024-05-29 DIAGNOSIS — N40.1 BENIGN PROSTATIC HYPERPLASIA WITH LOWER URINARY TRACT SYMPTOMS, SYMPTOM DETAILS UNSPECIFIED: ICD-10-CM

## 2024-05-29 LAB
ALBUMIN SERPL-MCNC: 4.6 G/DL (ref 3.5–5)
ANION GAP SERPL CALC-SCNC: 14 MMOL/L
BASOPHILS # BLD AUTO: 0.05 X10*3/UL (ref 0–0.1)
BASOPHILS NFR BLD AUTO: 0.8 %
BUN SERPL-MCNC: 19 MG/DL (ref 8–25)
CALCIUM SERPL-MCNC: 9.5 MG/DL (ref 8.5–10.4)
CHLORIDE SERPL-SCNC: 103 MMOL/L (ref 97–107)
CO2 SERPL-SCNC: 25 MMOL/L (ref 24–31)
CREAT SERPL-MCNC: 1.2 MG/DL (ref 0.4–1.6)
EGFRCR SERPLBLD CKD-EPI 2021: 60 ML/MIN/1.73M*2
EOSINOPHIL # BLD AUTO: 0.12 X10*3/UL (ref 0–0.4)
EOSINOPHIL NFR BLD AUTO: 1.8 %
ERYTHROCYTE [DISTWIDTH] IN BLOOD BY AUTOMATED COUNT: 13.8 % (ref 11.5–14.5)
GLUCOSE SERPL-MCNC: 105 MG/DL (ref 65–99)
HCT VFR BLD AUTO: 41 % (ref 41–52)
HGB BLD-MCNC: 13.3 G/DL (ref 13.5–17.5)
IMM GRANULOCYTES # BLD AUTO: 0.03 X10*3/UL (ref 0–0.5)
IMM GRANULOCYTES NFR BLD AUTO: 0.5 % (ref 0–0.9)
LYMPHOCYTES # BLD AUTO: 0.9 X10*3/UL (ref 0.8–3)
LYMPHOCYTES NFR BLD AUTO: 13.5 %
MCH RBC QN AUTO: 33 PG (ref 26–34)
MCHC RBC AUTO-ENTMCNC: 32.4 G/DL (ref 32–36)
MCV RBC AUTO: 102 FL (ref 80–100)
MONOCYTES # BLD AUTO: 0.36 X10*3/UL (ref 0.05–0.8)
MONOCYTES NFR BLD AUTO: 5.4 %
NEUTROPHILS # BLD AUTO: 5.2 X10*3/UL (ref 1.6–5.5)
NEUTROPHILS NFR BLD AUTO: 78 %
NRBC BLD-RTO: 0 /100 WBCS (ref 0–0)
PHOSPHATE SERPL-MCNC: 3.6 MG/DL (ref 2.5–4.5)
PLATELET # BLD AUTO: 225 X10*3/UL (ref 150–450)
POTASSIUM SERPL-SCNC: 3.6 MMOL/L (ref 3.4–5.1)
RBC # BLD AUTO: 4.03 X10*6/UL (ref 4.5–5.9)
SODIUM SERPL-SCNC: 142 MMOL/L (ref 133–145)
WBC # BLD AUTO: 6.7 X10*3/UL (ref 4.4–11.3)

## 2024-05-29 PROCEDURE — 1125F AMNT PAIN NOTED PAIN PRSNT: CPT

## 2024-05-29 PROCEDURE — 3077F SYST BP >= 140 MM HG: CPT

## 2024-05-29 PROCEDURE — 3080F DIAST BP >= 90 MM HG: CPT

## 2024-05-29 PROCEDURE — 36415 COLL VENOUS BLD VENIPUNCTURE: CPT

## 2024-05-29 PROCEDURE — 1159F MED LIST DOCD IN RCRD: CPT

## 2024-05-29 PROCEDURE — 1160F RVW MEDS BY RX/DR IN RCRD: CPT

## 2024-05-29 PROCEDURE — 80069 RENAL FUNCTION PANEL: CPT

## 2024-05-29 PROCEDURE — 85025 COMPLETE CBC W/AUTO DIFF WBC: CPT

## 2024-05-29 PROCEDURE — 99349 HOME/RES VST EST MOD MDM 40: CPT

## 2024-05-29 RX ORDER — POTASSIUM CHLORIDE 750 MG/1
10 TABLET, FILM COATED, EXTENDED RELEASE ORAL DAILY
Qty: 7 TABLET | Refills: 0 | Status: SHIPPED | OUTPATIENT
Start: 2024-05-29 | End: 2024-06-05

## 2024-05-29 RX ORDER — POTASSIUM CHLORIDE 750 MG/1
10 TABLET, FILM COATED, EXTENDED RELEASE ORAL DAILY
Qty: 30 TABLET | Refills: 11 | Status: SHIPPED | OUTPATIENT
Start: 2024-05-29 | End: 2024-05-29

## 2024-05-29 RX ORDER — FUROSEMIDE 40 MG/1
40 TABLET ORAL DAILY
Qty: 7 TABLET | Refills: 0 | Status: SHIPPED | OUTPATIENT
Start: 2024-05-29 | End: 2024-06-05

## 2024-05-29 ASSESSMENT — ENCOUNTER SYMPTOMS
COUGH: 1
SINUS PAIN: 0
JOINT SWELLING: 1
DIZZINESS: 0
CONSTIPATION: 0
BACK PAIN: 1
SLEEP DISTURBANCE: 0
TREMORS: 0
UNEXPECTED WEIGHT CHANGE: 0
DECREASED CONCENTRATION: 1
RHINORRHEA: 1
FEVER: 1
ABDOMINAL PAIN: 0
FACIAL ASYMMETRY: 0
AGITATION: 0
SHORTNESS OF BREATH: 1
WOUND: 0
NAUSEA: 0
BLOOD IN STOOL: 0
CHILLS: 0
SINUS PRESSURE: 1
CHOKING: 0
TROUBLE SWALLOWING: 0
CHEST TIGHTNESS: 0
SPEECH DIFFICULTY: 0
ARTHRALGIAS: 1
FATIGUE: 1
WHEEZING: 0
VOMITING: 0
APPETITE CHANGE: 0
LIGHT-HEADEDNESS: 0
DIARRHEA: 0
PALPITATIONS: 0
ABDOMINAL DISTENTION: 0
EYE PAIN: 0
SORE THROAT: 0
ACTIVITY CHANGE: 1
SEIZURES: 0
CONFUSION: 0
NUMBNESS: 0
HEADACHES: 0
DIAPHORESIS: 0
BRUISES/BLEEDS EASILY: 1
NERVOUS/ANXIOUS: 0

## 2024-05-29 ASSESSMENT — PAIN SCALES - GENERAL: PAINLEVEL: 6

## 2024-05-29 NOTE — PROGRESS NOTES
"Subjective   Patient ID: Demarco Gudino is a 83 y.o. male who presents for Follow-up (BLE edema, HTN, RA, OA).    HPI   Demarco Gudino is seen in his private home today for a routine follow up of chronic medical conditions. He is alert and oriented to person, place, time and situation. He is able to answer most questions related to his health.     PMH: HTN, HLD, AAA, PVD, rheumatoid arthritis, neuropathy, depression/anxiety and dementia.     Demarco answers the door and ambulates independently to the couch where he sits with his feet dependent. He is active with [facility] services through [employer], receiving PT weekly, but reports that he only has one visit left. Patient resides in a single family two story home by himself. He has a limited support system as he wife resides in a LTC facility and his only other relative lives 3 hours away. Patient does not drive and has his medications delivered. He is also active with [employer] [facility] and a . Patient ambulates independently and denies any recent falls. Medications reviewed with patient      BLE edema - Reports worsening of BLE edema. States \"I can't sit in the chair and put my feet up all day\" and \"I can't put the compression stockings on myself, we tried wraps but they didn't work\".     Depression/ Dementia/ Suicide attempt Hx - Stable. Patient denies any current SI and continues to follow routinely with [facility] for mental health services. He reports that he is feeling more fatigued the past couple of weeks, it is hard for him to get motivated to do things around the house and he tires easily.      AAA - Surgical repair was originally recommended in 09/2022. Patient has not yet followed with a vascular surgeon and has no intentions to at this time.     Arthritis of multiple joints - Patient admits to chronic pain/ inflammation in his right hand and decreased mobility in his left shoulder, the pain is at baseline for him.    Home Visit: Medically " necessary due to unsteady gait/poor balance, and Illness or condition that results in activity limitation or restriction that impacts the ability to leave home such as: equipment and /or human assistance needed to safely leave the home, patient has limited support systems to help the patient attend office visits, the office visit would require excessive physical effort or pain for the patient.       Current Outpatient Medications:     Allergy Relief, loratadine, 10 mg tablet, Take 1 tablet (10 mg) by mouth once daily., Disp: 30 tablet, Rfl: 11    amLODIPine (Norvasc) 5 mg tablet, Take 1 tablet (5 mg) by mouth once daily., Disp: 30 tablet, Rfl: 11    artificial tears, dextran-hypomel-glycerin, 0.1-0.3-0.2 % ophthalmic solution, Administer 1 drop into affected eye(s) 4 times a day as needed for dry eyes., Disp: , Rfl:     azelastine (Astelin) 137 mcg (0.1 %) nasal spray, Administer 1 spray into each nostril 2 times a day., Disp: 30 mL, Rfl: 11    budesonide EC (Entocort EC) 3 mg 24 hr capsule, Take 1 capsule (3 mg) by mouth once daily., Disp: 30 capsule, Rfl: 11    cholecalciferol (Vitamin D-3) 125 MCG (5000 UT) capsule, Take 1 capsule (125 mcg) by mouth once daily., Disp: 30 capsule, Rfl: 11    furosemide (Lasix) 40 mg tablet, Take 1 tablet (40 mg) by mouth once daily., Disp: 30 tablet, Rfl: 11    gabapentin (Neurontin) 300 mg capsule, Take 1 capsule (300 mg) by mouth 3 times a day., Disp: 90 capsule, Rfl: 5    melatonin 3 mg tablet, Take 1 tablet (3 mg) by mouth once daily at bedtime., Disp: 30 tablet, Rfl: 11    meloxicam (Mobic) 7.5 mg tablet, Take 1 tablet (7.5 mg) by mouth once daily., Disp: 30 tablet, Rfl: 11    memantine (Namenda) 28 mg capsule,sprinkle,ER 24hr, Take 1 capsule (28 mg) by mouth once daily., Disp: 30 capsule, Rfl: 11    polyethylene glycol (Glycolax, Miralax) 17 gram packet, Take 17 g by mouth once daily. Do not start before February 23, 2024., Disp: , Rfl:     polyvinyl alcohol (Liquifilm  Tears) 1.4 % ophthalmic solution, Administer 2 drops into both eyes 3 times a day as needed., Disp: , Rfl:     potassium chloride CR 10 mEq ER tablet, Take 1 tablet (10 mEq) by mouth once daily., Disp: , Rfl:     simvastatin (Zocor) 20 mg tablet, Take 1 tablet (20 mg) by mouth once daily., Disp: 30 tablet, Rfl: 11    tamsulosin (Flomax) 0.4 mg 24 hr capsule, Take 1 capsule (0.4 mg) by mouth once daily., Disp: 30 capsule, Rfl: 11    traZODone (Desyrel) 100 mg tablet, Take 1 tablet (100 mg) by mouth once daily at bedtime., Disp: 30 tablet, Rfl: 11    venlafaxine (Effexor) 50 mg tablet, Take 1 tablet (50 mg) by mouth once daily in the morning. Take before meals., Disp: 30 tablet, Rfl: 11    furosemide (Lasix) 40 mg tablet, Take 1 tablet (40 mg) by mouth once daily for 7 days. Take 1 tablet (40 mg) for 3 days for increased leg swelling., Disp: 7 tablet, Rfl: 0    potassium chloride CR (Klor-Con) 10 mEq ER tablet, Take 1 tablet (10 mEq) by mouth once daily for 7 days. Do not crush, chew, or split. Take 1 tablet (10 mEq) for 3 days when taking prn lasix, Disp: 7 tablet, Rfl: 0     Review of Systems   Constitutional:  Positive for activity change, fatigue and fever. Negative for appetite change, chills, diaphoresis and unexpected weight change.   HENT:  Positive for congestion, postnasal drip, rhinorrhea and sinus pressure. Negative for sinus pain, sneezing, sore throat and trouble swallowing.    Eyes:  Negative for pain.        Wear glasses   Respiratory:  Positive for cough and shortness of breath. Negative for choking, chest tightness and wheezing.    Cardiovascular:  Positive for leg swelling. Negative for chest pain and palpitations.   Gastrointestinal:  Negative for abdominal distention, abdominal pain, blood in stool, constipation, diarrhea, nausea and vomiting.   Musculoskeletal:  Positive for arthralgias, back pain and joint swelling.   Skin:  Negative for rash and wound.   Neurological:  Negative for dizziness,  tremors, seizures, syncope, facial asymmetry, speech difficulty, light-headedness, numbness and headaches.   Hematological:  Bruises/bleeds easily.   Psychiatric/Behavioral:  Positive for decreased concentration. Negative for agitation, behavioral problems, confusion, self-injury, sleep disturbance and suicidal ideas. The patient is not nervous/anxious.        Objective   BP (!) 148/98 (BP Location: Right arm, Patient Position: Sitting, BP Cuff Size: Adult)   Pulse 89   Temp 37.2 °C (98.9 °F) (Temporal)   Resp 23   SpO2 99% Comment: room air  Lab Results   Component Value Date    HGBA1C 5.7 01/27/2022      Lab Results   Component Value Date    WBC 6.7 05/29/2024    HGB 13.3 (L) 05/29/2024    HCT 41.0 05/29/2024     (H) 05/29/2024     05/29/2024      Lab Results   Component Value Date    GLUCOSE 105 (H) 05/29/2024    CALCIUM 9.5 05/29/2024     05/29/2024    K 3.6 05/29/2024    CO2 25 05/29/2024     05/29/2024    BUN 19 05/29/2024    CREATININE 1.20 05/29/2024      Lab Results   Component Value Date    INR 1.1 07/27/2023    INR 1.1 06/04/2019    PROTIME 11.2 07/27/2023    PROTIME 11.4 06/04/2019          Physical Exam  Constitutional:       General: He is not in acute distress.     Appearance: He is normal weight. He is not toxic-appearing.      Comments: Chronically ill appearing, well-nourished.   HENT:      Head: Normocephalic.      Nose: No congestion or rhinorrhea.      Mouth/Throat:      Mouth: Mucous membranes are moist.   Eyes:      General: No scleral icterus.     Extraocular Movements: Extraocular movements intact.      Pupils: Pupils are equal, round, and reactive to light.   Cardiovascular:      Rate and Rhythm: Normal rate and regular rhythm.      Pulses: Normal pulses.      Heart sounds: No murmur heard.  Pulmonary:      Effort: Pulmonary effort is normal. No respiratory distress.      Breath sounds: No wheezing or rales.   Abdominal:      General: Bowel sounds are normal.       Palpations: Abdomen is soft.      Tenderness: There is no abdominal tenderness. There is no guarding.   Musculoskeletal:         General: Deformity present.      Cervical back: Neck supple.      Right lower leg: Edema present.      Left lower leg: Edema present.      Comments: Noted joint deformities in bilateral hands/fingers.  BLE 4+ pitting edema in bilateral feet, non pitting tibial edema.   Skin:     General: Skin is warm and dry.      Capillary Refill: Capillary refill takes less than 2 seconds.      Findings: No bruising, lesion or rash.      Comments: No dependent rubor noted in BLE.    Neurological:      General: No focal deficit present.      Mental Status: He is alert and oriented to person, place, and time.   Psychiatric:         Mood and Affect: Mood normal.         Behavior: Behavior normal.         Thought Content: Thought content normal.         Judgment: Judgment normal.         Assessment/Plan   Diagnoses and all orders for this visit:  Essential hypertension, benign  Abdominal aortic aneurysm (AAA) without rupture, unspecified part (CMS-HCC)  Bilateral leg edema  Chronic, BLE edema worsening. BP is elevated this visit. Denies chest pain, dizziness, headache. Endorses increased fatigue.   -will trial short term increase of furosemide   -furosemide (Lasix) 40 mg tablet; Take 1 tablet (40 mg) by mouth once daily for 7 days. Take 1 tablet (40 mg) for 3 days for increased leg swelling.  -potassium chloride CR (Klor-Con) 10 mEq ER tablet; Take 1 tablet (10 mEq) by mouth once daily for 7 days. Do not crush, chew, or split. Take 1 tablet (10 mEq) for 3 days when taking prn lasix  -continue daily lasix 40 mg, KCL 10 mEq, amlodipine 5mg, simvastatin 20mg  -encourage heart healthy diet  -encourage BLE elevation  -will trial tubigrips for light compression, PT to measure.   Chronic rhinitis  Chronic, stable.   -continue loratadine 10mg and azelastine 137mcg  Benign prostatic hyperplasia with lower  urinary tract symptoms, symptom details unspecified  Chronic, stable.  -continue tamsulosin 0.4mg  Rheumatoid arthritis involving multiple sites, unspecified whether rheumatoid factor present (Multi)  Primary osteoarthritis involving multiple joints  Neuropathy  Chronic, stable.  -continue meloxicam 7.5mg, gabapentin 300mg TID,   Mixed anxiety and depressive disorder  Dementia, unspecified dementia severity, unspecified dementia type, unspecified whether behavioral, psychotic, or mood disturbance or anxiety (Multi)  Chronic, stable. No SI. Follows with [facility]. Is not ready to seek additional help in the home or leave home for ERROL.   -continue support services  -continue memantine 28mg, venlafaxine 50mg    Patient is stable. Will trial short term increase of furosemide for increased BLE edema. Will see patient again in two weeks, he is at high risk for readmission due to multiple chronic health conditions. Labs drawn this visit, patient tolerated with minimal distress.          Marie Verde, APRN-CNP

## 2024-06-11 PROBLEM — B34.2 CORONAVIRUS INFECTION: Status: RESOLVED | Noted: 2023-08-01 | Resolved: 2024-06-11

## 2024-06-11 PROBLEM — M19.019 PRIMARY LOCALIZED OSTEOARTHROSIS OF SHOULDER REGION: Status: ACTIVE | Noted: 2019-01-23

## 2024-06-11 PROBLEM — S63.509A SPRAIN OF WRIST: Status: RESOLVED | Noted: 2023-08-01 | Resolved: 2024-06-11

## 2024-06-11 PROBLEM — S09.93XA INJURY OF FACE: Status: RESOLVED | Noted: 2023-08-01 | Resolved: 2024-06-11

## 2024-06-11 PROBLEM — S02.85XA FRACTURE OF ORBIT (MULTI): Status: RESOLVED | Noted: 2023-08-01 | Resolved: 2024-06-11

## 2024-06-11 PROBLEM — M17.9 OSTEOARTHRITIS OF KNEE: Status: ACTIVE | Noted: 2022-09-16

## 2024-06-11 RX ORDER — LISINOPRIL 20 MG/1
20 TABLET ORAL DAILY
COMMUNITY

## 2024-06-12 ENCOUNTER — TELEPHONE (OUTPATIENT)
Dept: PRIMARY CARE | Facility: CLINIC | Age: 84
End: 2024-06-12
Payer: COMMERCIAL

## 2024-06-13 ENCOUNTER — OFFICE VISIT (OUTPATIENT)
Dept: PRIMARY CARE | Facility: CLINIC | Age: 84
End: 2024-06-13
Payer: MEDICARE

## 2024-06-13 DIAGNOSIS — R60.0 BILATERAL LEG EDEMA: Primary | ICD-10-CM

## 2024-06-13 DIAGNOSIS — I10 ESSENTIAL HYPERTENSION, BENIGN: ICD-10-CM

## 2024-06-13 DIAGNOSIS — G62.9 NEUROPATHY: ICD-10-CM

## 2024-06-13 DIAGNOSIS — N40.1 BENIGN PROSTATIC HYPERPLASIA WITH URINARY HESITANCY: ICD-10-CM

## 2024-06-13 DIAGNOSIS — I71.43 INFRARENAL ABDOMINAL AORTIC ANEURYSM (AAA) WITHOUT RUPTURE (CMS-HCC): ICD-10-CM

## 2024-06-13 DIAGNOSIS — J31.0 CHRONIC RHINITIS: ICD-10-CM

## 2024-06-13 DIAGNOSIS — R53.1 GENERALIZED WEAKNESS: ICD-10-CM

## 2024-06-13 DIAGNOSIS — F43.21 GRIEF: ICD-10-CM

## 2024-06-13 DIAGNOSIS — F41.8 MIXED ANXIETY AND DEPRESSIVE DISORDER: ICD-10-CM

## 2024-06-13 DIAGNOSIS — M15.9 PRIMARY OSTEOARTHRITIS INVOLVING MULTIPLE JOINTS: ICD-10-CM

## 2024-06-13 DIAGNOSIS — R39.11 BENIGN PROSTATIC HYPERPLASIA WITH URINARY HESITANCY: ICD-10-CM

## 2024-06-13 DIAGNOSIS — F03.A0 MILD DEMENTIA WITHOUT BEHAVIORAL DISTURBANCE, PSYCHOTIC DISTURBANCE, MOOD DISTURBANCE, OR ANXIETY, UNSPECIFIED DEMENTIA TYPE (MULTI): ICD-10-CM

## 2024-06-13 DIAGNOSIS — I87.2 CHRONIC VENOUS INSUFFICIENCY OF LOWER EXTREMITY: ICD-10-CM

## 2024-06-13 DIAGNOSIS — M06.9 RHEUMATOID ARTHRITIS INVOLVING MULTIPLE SITES, UNSPECIFIED WHETHER RHEUMATOID FACTOR PRESENT (MULTI): ICD-10-CM

## 2024-06-13 PROCEDURE — 3079F DIAST BP 80-89 MM HG: CPT

## 2024-06-13 PROCEDURE — 99349 HOME/RES VST EST MOD MDM 40: CPT

## 2024-06-13 PROCEDURE — 3075F SYST BP GE 130 - 139MM HG: CPT

## 2024-06-13 PROCEDURE — 1125F AMNT PAIN NOTED PAIN PRSNT: CPT

## 2024-06-13 PROCEDURE — 1159F MED LIST DOCD IN RCRD: CPT

## 2024-06-13 PROCEDURE — 1160F RVW MEDS BY RX/DR IN RCRD: CPT

## 2024-06-13 ASSESSMENT — PAIN SCALES - GENERAL: PAINLEVEL: 5

## 2024-06-13 NOTE — PROGRESS NOTES
"Subjective   Patient ID: Demarco Gudino is a 83 y.o. male who presents for Follow-up (HTN, BLE edema, rheumatoid arthritis, osteoarthritis, and depression).    HPI   PMH: HTN, HLD, AAA, PVD, rheumatoid arthritis, neuropathy, depression/anxiety and dementia.      Demarco answers the door and ambulates independently to the couch where he sits with his feet dependent. He is active with [facility] services through [employer] [facility], receiving PT weekly, but reports that he only has one visit left. Patient resides in a single family two story home by himself. He has a limited support system as he wife resides in a LTC facility and his only other relative lives 3 hours away. Patient does not drive and has his medications delivered. He is also active with [employer] [facility] and a . Patient ambulates independently and denies any recent falls. Medications reviewed with patient      BLE edema - Reports BLE edema improved with trial of increased lasix but has gotten worse since back to his normal dose. States \"I can't sit in the chair and put my feet up all day\" and \"I can't put the compression stockings on myself, we tried wraps but they didn't work because I don't have hand dexterity to change them, and PT is not coming anymore\".      Depression/ Dementia/ Suicide attempt Hx - Stable. Patient denies any current SI and continues to follow routinely with [facility] for mental health services. He reports that he is fatigue is slightly improved but he continues to struggle with motivation.      AAA - Surgical repair was originally recommended in 09/2022. Patient has not yet followed with a vascular surgeon and has no intentions to at this time.     Arthritis of multiple joints - Patient admits to chronic pain/ inflammation in his bilateral hands and decreased mobility in his left shoulder and bilateral knees, the pain is at baseline for him.    Home Visit: Medically necessary due to  unsteady gait/poor balance, and " Illness or condition that results in activity limitation or restriction that impacts the ability to leave home such as: equipment and /or human assistance needed to safely leave the home, patient has limited support systems to help the patient attend office visits, the office visit would require excessive physical effort or pain for the patient.       Current Outpatient Medications:     Allergy Relief, loratadine, 10 mg tablet, Take 1 tablet (10 mg) by mouth once daily., Disp: 30 tablet, Rfl: 11    amLODIPine (Norvasc) 5 mg tablet, Take 1 tablet (5 mg) by mouth once daily., Disp: 30 tablet, Rfl: 11    artificial tears, dextran-hypomel-glycerin, 0.1-0.3-0.2 % ophthalmic solution, Administer 1 drop into affected eye(s) 4 times a day as needed for dry eyes., Disp: , Rfl:     azelastine (Astelin) 137 mcg (0.1 %) nasal spray, Administer 1 spray into each nostril 2 times a day., Disp: 30 mL, Rfl: 11    budesonide EC (Entocort EC) 3 mg 24 hr capsule, Take 1 capsule (3 mg) by mouth once daily., Disp: 30 capsule, Rfl: 11    cholecalciferol (Vitamin D-3) 125 MCG (5000 UT) capsule, Take 1 capsule (125 mcg) by mouth once daily., Disp: 30 capsule, Rfl: 11    furosemide (Lasix) 40 mg tablet, Take 1 tablet (40 mg) by mouth once daily., Disp: 30 tablet, Rfl: 11    gabapentin (Neurontin) 300 mg capsule, Take 1 capsule (300 mg) by mouth 3 times a day., Disp: 90 capsule, Rfl: 5    lisinopril 20 mg tablet, Take 1 tablet (20 mg) by mouth once daily., Disp: , Rfl:     melatonin 3 mg tablet, Take 1 tablet (3 mg) by mouth once daily at bedtime., Disp: 30 tablet, Rfl: 11    meloxicam (Mobic) 7.5 mg tablet, Take 1 tablet (7.5 mg) by mouth once daily., Disp: 30 tablet, Rfl: 11    memantine (Namenda) 28 mg capsule,sprinkle,ER 24hr, Take 1 capsule (28 mg) by mouth once daily., Disp: 30 capsule, Rfl: 11    polyethylene glycol (Glycolax, Miralax) 17 gram packet, Take 17 g by mouth once daily. Do not start before February 23, 2024., Disp: , Rfl:      polyvinyl alcohol (Liquifilm Tears) 1.4 % ophthalmic solution, Administer 2 drops into both eyes 3 times a day as needed., Disp: , Rfl:     simvastatin (Zocor) 20 mg tablet, Take 1 tablet (20 mg) by mouth once daily., Disp: 30 tablet, Rfl: 11    tamsulosin (Flomax) 0.4 mg 24 hr capsule, Take 1 capsule (0.4 mg) by mouth once daily., Disp: 30 capsule, Rfl: 11    traZODone (Desyrel) 100 mg tablet, Take 1 tablet (100 mg) by mouth once daily at bedtime., Disp: 30 tablet, Rfl: 11    venlafaxine (Effexor) 50 mg tablet, Take 1 tablet (50 mg) by mouth once daily in the morning. Take before meals., Disp: 30 tablet, Rfl: 11     Review of Systems  Constitutional:  Positive for activity change, fatigue and fever. Negative for appetite change, chills, diaphoresis and unexpected weight change.   HENT:  Positive for congestion, postnasal drip, rhinorrhea and sinus pressure. Negative for sinus pain, sneezing, sore throat and trouble swallowing.    Eyes:  Negative for pain.        Wear glasses   Respiratory:  Positive for cough and shortness of breath. Negative for choking, chest tightness and wheezing.    Cardiovascular:  Positive for leg swelling. Negative for chest pain and palpitations.   Gastrointestinal:  Negative for abdominal distention, abdominal pain, blood in stool, constipation, diarrhea, nausea and vomiting.   Musculoskeletal:  Positive for arthralgias, back pain and joint swelling.   Skin:  Negative for rash and wound.   Neurological:  Negative for dizziness, tremors, seizures, syncope, facial asymmetry, speech difficulty, light-headedness, numbness and headaches.   Hematological:  Bruises/bleeds easily.   Psychiatric/Behavioral:  Positive for decreased concentration. Negative for agitation, behavioral problems, confusion, self-injury, sleep disturbance and suicidal ideas. The patient is not nervous/anxious.    Objective   /85 (BP Location: Right arm, Patient Position: Sitting, BP Cuff Size: Adult)   Pulse 72    Temp 37 °C (98.6 °F) (Temporal)   Resp 20   SpO2 99% Comment: room air  Lab Results   Component Value Date    HGBA1C 5.7 01/27/2022      Lab Results   Component Value Date    WBC 6.7 05/29/2024    HGB 13.3 (L) 05/29/2024    HCT 41.0 05/29/2024     (H) 05/29/2024     05/29/2024      Lab Results   Component Value Date    GLUCOSE 105 (H) 05/29/2024    CALCIUM 9.5 05/29/2024     05/29/2024    K 3.6 05/29/2024    CO2 25 05/29/2024     05/29/2024    BUN 19 05/29/2024    CREATININE 1.20 05/29/2024      Physical Exam  Constitutional:       General: He is not in acute distress.     Appearance: He is normal weight. He is not toxic-appearing.      Comments: Chronically ill appearing, well-nourished.   HENT:      Head: Normocephalic.      Nose: No congestion or rhinorrhea.      Mouth/Throat:      Mouth: Mucous membranes are moist.   Eyes:      General: No scleral icterus.     Extraocular Movements: Extraocular movements intact.      Pupils: Pupils are equal, round, and reactive to light.   Cardiovascular:      Rate and Rhythm: Normal rate and regular rhythm.      Pulses: Normal pulses.      Heart sounds: No murmur heard.  Pulmonary:      Effort: Pulmonary effort is normal. No respiratory distress.      Breath sounds: No wheezing or rales.   Abdominal:      General: Bowel sounds are normal.      Palpations: Abdomen is soft.      Tenderness: There is no abdominal tenderness. There is no guarding.   Musculoskeletal:         General: Deformity present.      Cervical back: Neck supple.      Right lower leg: Edema present.      Left lower leg: Edema present.      Comments: Noted joint deformities in bilateral hands/fingers.  BLE 2-3+ pitting edema in bilateral feet (improved)  tibial edema has improved.  Skin:     General: Skin is warm and dry.      Capillary Refill: Capillary refill takes less than 2 seconds.      Findings: No bruising, lesion or rash.      Comments: No dependent rubor noted in BLE.     Neurological:      General: No focal deficit present.      Mental Status: He is alert and oriented to person, place, and time.   Psychiatric:         Mood and Affect: appears more down today, but still engaged in visit and smiles/laughs at times.     Behavior: Behavior normal.         Thought Content: Thought content normal.         Judgment: Judgment normal.   Assessment/Plan   Diagnoses and all orders for this visit:  Chronic venous insufficiency of lower extremity  Bilateral leg edema  Essential hypertension, benign  Infrarenal abdominal aortic aneurysm (AAA) without rupture (CMS-[facility])  Chronic, BLE edema with minimal improvement after trial of increased furosemide.  BP is improved this visit. Denies chest pain, dizziness, headache. Reports fatigue is slightly improved.  -rheumatoid arthritis may be contributing factor to BLE edema, perhaps causing dysfunction within the lymphatic system.   -continue daily lasix 40 mg, KCL 10 mEq, amlodipine 5mg, simvastatin 20mg  -encourage heart healthy diet  -encourage BLE elevation  -referral for HHC placed, patient needs PT to maintain his current functional status and remain out of the hospital. Compression wraps are recommended as best practice per guidelines. Patient is unable to apply these independently.  Chronic rhinitis  Chronic, stable.   -continue loratadine 10mg and azelastine 137mcg  Benign prostatic hyperplasia with urinary hesitancy  Chronic, stable.   -continue loratadine 10mg and azelastine 137mcg  Primary osteoarthritis involving multiple joints  Rheumatoid arthritis involving multiple sites, unspecified whether rheumatoid factor present (Multi)  Neuropathy  Generalized weakness  Chronic, stable.  -continue meloxicam 7.5mg, gabapentin 300mg TID  -referral for PT services, patient requires PT to maintain current functional status and prevent hospitalization.  Mild dementia without behavioral disturbance, psychotic disturbance, mood disturbance, or anxiety,  "unspecified dementia type (Multi)  Mixed anxiety and depressive disorder   Grief  Chronic, stable. No SI. Follows with [facility]. Is not ready to seek additional help in the home or leave home for [facility]. Patient admits to drinking bourbon daily, \"just one shot\".  -continue support services  -continue memantine 28mg, venlafaxine 50mg    F2F for PT/OT: patient with active rheumatoid arthritis, chronic pain and worsening BLE edema. He does not have the dexterity or strength to apply compression wraps to manage the BLE edema. He does not have heart failure, kidney or liver disease that contribute to the BLE edema, so increasing diuretics will not be helpful. Assistance with compression wraps and strength training will improve his quality of life and allow him more independence and physical activity.     Patient is stable. Referral placed for PT/OT, patient needs this service to maintain current functional status and improve overall strength to remain at home. Will continue with House Call NP visits as patient does not drive and is at high risk for readmission to the hospital due to multiple chronic medical conditions.           Marie Verde, APRN-CNP    "

## 2024-06-14 ENCOUNTER — APPOINTMENT (OUTPATIENT)
Dept: PRIMARY CARE | Facility: CLINIC | Age: 84
End: 2024-06-14
Payer: MEDICARE

## 2024-06-15 VITALS
TEMPERATURE: 98.6 F | OXYGEN SATURATION: 99 % | RESPIRATION RATE: 20 BRPM | DIASTOLIC BLOOD PRESSURE: 85 MMHG | HEART RATE: 72 BPM | SYSTOLIC BLOOD PRESSURE: 130 MMHG

## 2024-06-15 PROBLEM — Z96.612 STATUS POST REVERSE TOTAL REPLACEMENT OF LEFT SHOULDER: Status: RESOLVED | Noted: 2023-11-15 | Resolved: 2024-06-15

## 2024-06-15 PROBLEM — J32.9 SINUSITIS: Status: RESOLVED | Noted: 2019-11-18 | Resolved: 2024-06-15

## 2024-06-15 PROBLEM — G47.00 INSOMNIA: Status: RESOLVED | Noted: 2023-11-15 | Resolved: 2024-06-15

## 2024-06-15 PROBLEM — R53.1 WEAKNESS: Status: RESOLVED | Noted: 2024-02-19 | Resolved: 2024-06-15

## 2024-06-15 PROBLEM — R41.3 MEMORY LOSS DUE TO MEDICAL CONDITION: Status: RESOLVED | Noted: 2023-11-15 | Resolved: 2024-06-15

## 2024-06-15 PROBLEM — G93.40 ENCEPHALOPATHY: Status: RESOLVED | Noted: 2023-11-15 | Resolved: 2024-06-15

## 2024-06-15 PROBLEM — N17.9 AKI (ACUTE KIDNEY INJURY) (CMS-HCC): Status: RESOLVED | Noted: 2024-02-05 | Resolved: 2024-06-15

## 2024-06-15 PROBLEM — F41.8 MIXED ANXIETY AND DEPRESSIVE DISORDER: Status: RESOLVED | Noted: 2023-11-15 | Resolved: 2024-06-15

## 2024-06-15 PROBLEM — M19.019 PRIMARY LOCALIZED OSTEOARTHROSIS OF SHOULDER REGION: Status: RESOLVED | Noted: 2019-01-23 | Resolved: 2024-06-15

## 2024-06-15 PROBLEM — I95.9 HYPOTENSION: Status: RESOLVED | Noted: 2024-02-05 | Resolved: 2024-06-15

## 2024-06-15 PROBLEM — H91.90 HEARING LOSS: Status: RESOLVED | Noted: 2023-11-15 | Resolved: 2024-06-15

## 2024-06-15 PROBLEM — M17.9 OSTEOARTHRITIS OF KNEE: Status: RESOLVED | Noted: 2022-09-16 | Resolved: 2024-06-15

## 2024-06-15 PROBLEM — K59.00 CONSTIPATION: Status: RESOLVED | Noted: 2023-11-15 | Resolved: 2024-06-15

## 2024-06-15 PROBLEM — M19.079 PRIMARY LOCALIZED OSTEOARTHROSIS OF ANKLE AND FOOT: Status: RESOLVED | Noted: 2023-11-15 | Resolved: 2024-06-15

## 2024-06-15 PROBLEM — G40.909 SEIZURE DISORDER (MULTI): Status: RESOLVED | Noted: 2023-11-15 | Resolved: 2024-06-15

## 2024-06-15 PROBLEM — R42 DIZZINESS: Status: RESOLVED | Noted: 2024-02-05 | Resolved: 2024-06-15

## 2024-06-15 PROBLEM — R62.7 FAILURE TO THRIVE IN ADULT: Status: RESOLVED | Noted: 2024-02-19 | Resolved: 2024-06-15

## 2024-06-15 PROBLEM — M19.049 LOCALIZED, PRIMARY OSTEOARTHRITIS OF HAND: Status: RESOLVED | Noted: 2023-11-15 | Resolved: 2024-06-15

## 2024-06-15 PROBLEM — F32.9 MAJOR DEPRESSIVE DISORDER, SINGLE EPISODE, UNSPECIFIED: Status: RESOLVED | Noted: 2023-11-15 | Resolved: 2024-06-15

## 2024-06-15 PROBLEM — F41.9 ANXIETY: Status: RESOLVED | Noted: 2023-11-15 | Resolved: 2024-06-15

## 2024-06-15 PROBLEM — Z98.890 HISTORY OF REVERSE TOTAL REPLACEMENT OF RIGHT SHOULDER JOINT: Status: RESOLVED | Noted: 2023-08-01 | Resolved: 2024-06-15

## 2024-06-15 RX ORDER — FUROSEMIDE 20 MG/1
20 TABLET ORAL DAILY
Qty: 30 TABLET | Refills: 11 | Status: SHIPPED | OUTPATIENT
Start: 2024-06-15 | End: 2024-06-15

## 2024-06-15 RX ORDER — POTASSIUM CHLORIDE 750 MG/1
20 TABLET, EXTENDED RELEASE ORAL
Qty: 60 TABLET | Refills: 0 | Status: SHIPPED | OUTPATIENT
Start: 2024-06-15 | End: 2024-06-15

## 2024-06-16 ENCOUNTER — HOME HEALTH ADMISSION (OUTPATIENT)
Dept: HOME HEALTH SERVICES | Facility: HOME HEALTH | Age: 84
End: 2024-06-16
Payer: MEDICARE

## 2024-06-16 ENCOUNTER — DOCUMENTATION (OUTPATIENT)
Dept: HOME HEALTH SERVICES | Facility: HOME HEALTH | Age: 84
End: 2024-06-16
Payer: COMMERCIAL

## 2024-06-16 PROBLEM — I87.2 CHRONIC VENOUS INSUFFICIENCY OF LOWER EXTREMITY: Status: ACTIVE | Noted: 2024-06-16

## 2024-06-16 NOTE — HH CARE COORDINATION
Home Care received a Referral for PHYSICAL THERAPY. We have processed the referral for a Start of Care on 6/18.     If you have any questions or concerns, please feel free to contact us at [phone]. Follow the prompts, enter your five digit zip code, and you will be directed to your care team on [facility].

## 2024-07-08 PROBLEM — N39.0 URINARY TRACT INFECTION: Status: RESOLVED | Noted: 2024-07-08 | Resolved: 2024-07-08

## 2024-07-08 PROBLEM — H52.00 HYPEROPIA: Status: RESOLVED | Noted: 2021-06-04 | Resolved: 2024-07-08

## 2024-07-09 ENCOUNTER — TELEPHONE (OUTPATIENT)
Dept: PRIMARY CARE | Facility: CLINIC | Age: 84
End: 2024-07-09

## 2024-07-10 ENCOUNTER — OFFICE VISIT (OUTPATIENT)
Dept: PRIMARY CARE | Facility: CLINIC | Age: 84
End: 2024-07-10
Payer: MEDICARE

## 2024-07-10 VITALS
SYSTOLIC BLOOD PRESSURE: 158 MMHG | TEMPERATURE: 98.6 F | HEART RATE: 76 BPM | RESPIRATION RATE: 23 BRPM | OXYGEN SATURATION: 99 % | DIASTOLIC BLOOD PRESSURE: 82 MMHG

## 2024-07-10 DIAGNOSIS — F03.A0 MILD DEMENTIA WITHOUT BEHAVIORAL DISTURBANCE, PSYCHOTIC DISTURBANCE, MOOD DISTURBANCE, OR ANXIETY, UNSPECIFIED DEMENTIA TYPE (MULTI): ICD-10-CM

## 2024-07-10 DIAGNOSIS — F33.2 MAJOR DEPRESSIVE DISORDER, RECURRENT SEVERE WITHOUT PSYCHOTIC FEATURES (MULTI): ICD-10-CM

## 2024-07-10 DIAGNOSIS — I87.2 CHRONIC VENOUS INSUFFICIENCY OF LOWER EXTREMITY: ICD-10-CM

## 2024-07-10 DIAGNOSIS — E55.9 VITAMIN D DEFICIENCY: ICD-10-CM

## 2024-07-10 DIAGNOSIS — M06.9 RHEUMATOID ARTHRITIS INVOLVING MULTIPLE SITES, UNSPECIFIED WHETHER RHEUMATOID FACTOR PRESENT (MULTI): ICD-10-CM

## 2024-07-10 DIAGNOSIS — G62.9 NEUROPATHY: ICD-10-CM

## 2024-07-10 DIAGNOSIS — R39.11 BENIGN PROSTATIC HYPERPLASIA WITH URINARY HESITANCY: ICD-10-CM

## 2024-07-10 DIAGNOSIS — E78.5 HYPERLIPIDEMIA, UNSPECIFIED HYPERLIPIDEMIA TYPE: ICD-10-CM

## 2024-07-10 DIAGNOSIS — N40.1 BENIGN PROSTATIC HYPERPLASIA WITH URINARY HESITANCY: ICD-10-CM

## 2024-07-10 DIAGNOSIS — F43.21 GRIEF: ICD-10-CM

## 2024-07-10 DIAGNOSIS — I10 ESSENTIAL HYPERTENSION, BENIGN: Primary | ICD-10-CM

## 2024-07-10 DIAGNOSIS — F41.8 MIXED ANXIETY AND DEPRESSIVE DISORDER: ICD-10-CM

## 2024-07-10 DIAGNOSIS — M15.9 PRIMARY OSTEOARTHRITIS INVOLVING MULTIPLE JOINTS: ICD-10-CM

## 2024-07-10 DIAGNOSIS — R53.1 GENERALIZED WEAKNESS: ICD-10-CM

## 2024-07-10 DIAGNOSIS — R60.0 BILATERAL LEG EDEMA: ICD-10-CM

## 2024-07-10 LAB
25(OH)D3 SERPL-MCNC: 77 NG/ML (ref 31–100)
ALBUMIN SERPL-MCNC: 4.2 G/DL (ref 3.5–5)
ANION GAP SERPL CALC-SCNC: 10 MMOL/L
BASOPHILS # BLD AUTO: 0.04 X10*3/UL (ref 0–0.1)
BASOPHILS NFR BLD AUTO: 0.7 %
BUN SERPL-MCNC: 16 MG/DL (ref 8–25)
CALCIUM SERPL-MCNC: 9.3 MG/DL (ref 8.5–10.4)
CHLORIDE SERPL-SCNC: 102 MMOL/L (ref 97–107)
CHOLEST SERPL-MCNC: 145 MG/DL (ref 133–200)
CHOLEST/HDLC SERPL: 2.3 {RATIO}
CO2 SERPL-SCNC: 28 MMOL/L (ref 24–31)
CREAT SERPL-MCNC: 1.4 MG/DL (ref 0.4–1.6)
EGFRCR SERPLBLD CKD-EPI 2021: 50 ML/MIN/1.73M*2
EOSINOPHIL # BLD AUTO: 0.08 X10*3/UL (ref 0–0.4)
EOSINOPHIL NFR BLD AUTO: 1.4 %
ERYTHROCYTE [DISTWIDTH] IN BLOOD BY AUTOMATED COUNT: 13.2 % (ref 11.5–14.5)
GLUCOSE SERPL-MCNC: 83 MG/DL (ref 65–99)
HCT VFR BLD AUTO: 36.8 % (ref 41–52)
HDLC SERPL-MCNC: 63 MG/DL
HGB BLD-MCNC: 12.3 G/DL (ref 13.5–17.5)
IMM GRANULOCYTES # BLD AUTO: 0.02 X10*3/UL (ref 0–0.5)
IMM GRANULOCYTES NFR BLD AUTO: 0.3 % (ref 0–0.9)
LDLC SERPL CALC-MCNC: 70 MG/DL (ref 65–130)
LYMPHOCYTES # BLD AUTO: 1.04 X10*3/UL (ref 0.8–3)
LYMPHOCYTES NFR BLD AUTO: 17.7 %
MCH RBC QN AUTO: 34.4 PG (ref 26–34)
MCHC RBC AUTO-ENTMCNC: 33.4 G/DL (ref 32–36)
MCV RBC AUTO: 103 FL (ref 80–100)
MONOCYTES # BLD AUTO: 0.46 X10*3/UL (ref 0.05–0.8)
MONOCYTES NFR BLD AUTO: 7.8 %
NEUTROPHILS # BLD AUTO: 4.23 X10*3/UL (ref 1.6–5.5)
NEUTROPHILS NFR BLD AUTO: 72.1 %
NRBC BLD-RTO: 0 /100 WBCS (ref 0–0)
PHOSPHATE SERPL-MCNC: 3.6 MG/DL (ref 2.5–4.5)
PLATELET # BLD AUTO: 209 X10*3/UL (ref 150–450)
POTASSIUM SERPL-SCNC: 3.8 MMOL/L (ref 3.4–5.1)
RBC # BLD AUTO: 3.58 X10*6/UL (ref 4.5–5.9)
SODIUM SERPL-SCNC: 140 MMOL/L (ref 133–145)
TRIGL SERPL-MCNC: 60 MG/DL (ref 40–150)
TSH SERPL DL<=0.05 MIU/L-ACNC: 1.79 MIU/L (ref 0.27–4.2)
VIT B12 SERPL-MCNC: 189 PG/ML (ref 211–946)
WBC # BLD AUTO: 5.9 X10*3/UL (ref 4.4–11.3)

## 2024-07-10 PROCEDURE — 82607 VITAMIN B-12: CPT

## 2024-07-10 PROCEDURE — 80069 RENAL FUNCTION PANEL: CPT

## 2024-07-10 PROCEDURE — 80061 LIPID PANEL: CPT

## 2024-07-10 PROCEDURE — 82306 VITAMIN D 25 HYDROXY: CPT

## 2024-07-10 PROCEDURE — 84443 ASSAY THYROID STIM HORMONE: CPT

## 2024-07-10 PROCEDURE — 36415 COLL VENOUS BLD VENIPUNCTURE: CPT

## 2024-07-10 PROCEDURE — 85025 COMPLETE CBC W/AUTO DIFF WBC: CPT

## 2024-07-10 RX ORDER — POTASSIUM CHLORIDE 750 MG/1
10 TABLET, FILM COATED, EXTENDED RELEASE ORAL DAILY
Qty: 30 TABLET | Refills: 11 | Status: SHIPPED | OUTPATIENT
Start: 2024-07-10 | End: 2025-07-10

## 2024-07-10 RX ORDER — FUROSEMIDE 20 MG/1
20 TABLET ORAL DAILY
Qty: 30 TABLET | Refills: 11 | Status: SHIPPED | OUTPATIENT
Start: 2024-07-10 | End: 2025-07-10

## 2024-07-10 ASSESSMENT — PAIN SCALES - GENERAL: PAINLEVEL: 5

## 2024-07-10 NOTE — PROGRESS NOTES
"Subjective   Patient ID: Demarco Gudino is a 83 y.o. male who presents for Follow-up (Follow up) for chronic medical conditions.    HPI   Patient seen on the couch in his private home. He is alert, oriented to person, place, time and situation. He is able to answer all questions regarding his health.     PMH: HTN, HLD, AAA, PVD, rheumatoid arthritis, neuropathy, depression/anxiety and dementia.      Demarco is standing at the kitchen sink upon my arrival,  ambulates independently to the couch where he sits with his feet dependent. He is no longer active with [facility] services through [employer]. Patient resides in a single family two story home by himself. He has a limited support system as he wife resides in a LTC facility and his only other relative lives 3 hours away. Patient does not drive and has his medications delivered. He is also active with [employer] [facility] and a . Patient ambulates independently and denies any recent falls. Medications reviewed with patient, he reports compliance with all prescribed medications.      BLE edema - Reports BLE edema has remained the same. States \"I can't sit in the chair and put my feet up all day\" and \"I can't put the compression stockings on myself, we tried wraps but they didn't work because I don't have hand dexterity to change them, and PT is not coming anymore\".  He expresses interest in compression pumps, either manual or electronic.      Depression/ Dementia/ Suicide attempt Hx - Stable. Patient denies any current SI and continues to follow routinely with [facility] for mental health services. He reports that he is fatigue is slightly improved but he continues to struggle with motivation. Nithya Fitch CNP with [facility] is present during my visit and completes her assessment of the patient.      AAA - Surgical repair was originally recommended in 09/2022. Patient has not yet followed with a vascular surgeon and has no intentions to at this " time.     Arthritis of multiple joints - Patient admits to chronic pain/ inflammation in his bilateral hands and decreased mobility in his left shoulder and bilateral knees, the pain is at baseline for him.    Home Visit: Medically necessary due to mild cognitive impairment, unsteady gait/poor balance, and Illness or condition that results in activity limitation or restriction that impacts the ability to leave home such as: equipment and /or human assistance needed to safely leave the home, patient has limited support systems to help the patient attend office visits, the office visit would require excessive physical effort or pain for the patient.       Current Outpatient Medications:     Allergy Relief, loratadine, 10 mg tablet, Take 1 tablet (10 mg) by mouth once daily., Disp: 30 tablet, Rfl: 11    amLODIPine (Norvasc) 5 mg tablet, Take 1 tablet (5 mg) by mouth once daily., Disp: 30 tablet, Rfl: 11    artificial tears, dextran-hypomel-glycerin, 0.1-0.3-0.2 % ophthalmic solution, Administer 1 drop into affected eye(s) 4 times a day as needed for dry eyes., Disp: , Rfl:     azelastine (Astelin) 137 mcg (0.1 %) nasal spray, Administer 1 spray into each nostril 2 times a day., Disp: 30 mL, Rfl: 11    budesonide EC (Entocort EC) 3 mg 24 hr capsule, Take 1 capsule (3 mg) by mouth once daily., Disp: 30 capsule, Rfl: 11    cholecalciferol (Vitamin D-3) 125 MCG (5000 UT) capsule, Take 1 capsule (125 mcg) by mouth once daily., Disp: 30 capsule, Rfl: 11    furosemide (Lasix) 40 mg tablet, Take 1 tablet (40 mg) by mouth once daily., Disp: 30 tablet, Rfl: 11    gabapentin (Neurontin) 300 mg capsule, Take 1 capsule (300 mg) by mouth 3 times a day., Disp: 90 capsule, Rfl: 5    lisinopril 20 mg tablet, Take 1 tablet (20 mg) by mouth once daily., Disp: , Rfl:     melatonin 3 mg tablet, Take 1 tablet (3 mg) by mouth once daily at bedtime., Disp: 30 tablet, Rfl: 11    meloxicam (Mobic) 7.5 mg tablet, Take 1 tablet (7.5 mg) by mouth  once daily., Disp: 30 tablet, Rfl: 11    memantine (Namenda) 28 mg capsule,sprinkle,ER 24hr, Take 1 capsule (28 mg) by mouth once daily., Disp: 30 capsule, Rfl: 11    polyethylene glycol (Glycolax, Miralax) 17 gram packet, Take 17 g by mouth once daily. Do not start before February 23, 2024., Disp: , Rfl:     polyvinyl alcohol (Liquifilm Tears) 1.4 % ophthalmic solution, Administer 2 drops into both eyes 3 times a day as needed., Disp: , Rfl:     simvastatin (Zocor) 20 mg tablet, Take 1 tablet (20 mg) by mouth once daily., Disp: 30 tablet, Rfl: 11    tamsulosin (Flomax) 0.4 mg 24 hr capsule, Take 1 capsule (0.4 mg) by mouth once daily., Disp: 30 capsule, Rfl: 11    traZODone (Desyrel) 100 mg tablet, Take 1 tablet (100 mg) by mouth once daily at bedtime., Disp: 30 tablet, Rfl: 11    venlafaxine (Effexor) 50 mg tablet, Take 1 tablet (50 mg) by mouth once daily in the morning. Take before meals., Disp: 30 tablet, Rfl: 11    furosemide (Lasix) 20 mg tablet, Take 1 tablet (20 mg) by mouth once daily., Disp: 30 tablet, Rfl: 11    potassium chloride CR (Klor-Con) 10 mEq ER tablet, Take 1 tablet (10 mEq) by mouth once daily. Do not crush, chew, or split., Disp: 30 tablet, Rfl: 11     Review of Systems  Constitutional:  Positive for activity change, fatigue and fever. Negative for appetite change, chills, diaphoresis and unexpected weight change.   HENT:  Positive for congestion, postnasal drip, rhinorrhea and sinus pressure. Negative for sinus pain, sneezing, sore throat and trouble swallowing.    Eyes:  Negative for pain.        Wear glasses   Respiratory:  Positive for cough and shortness of breath. Negative for choking, chest tightness and wheezing.    Cardiovascular:  Positive for leg swelling. Negative for chest pain and palpitations.   Gastrointestinal:  Negative for abdominal distention, abdominal pain, blood in stool, constipation, diarrhea, nausea and vomiting.   Musculoskeletal:  Positive for arthralgias, back  "pain and joint swelling.   Skin:  Negative for rash and wound.   Neurological:  Negative for dizziness, tremors, seizures, syncope, facial asymmetry, speech difficulty, light-headedness, numbness and headaches.   Hematological:  Bruises/bleeds easily.   Psychiatric/Behavioral:  Positive for decreased concentration. Negative for agitation, behavioral problems, confusion, self-injury, sleep disturbance and suicidal ideas. The patient is not nervous/anxious. He admits to an occasional \"nip of bourbon\".   Objective   /82   Pulse 76   Temp 37 °C (98.6 °F)   Resp 23   SpO2 99% Comment: room air  Lab Results   Component Value Date    HGBA1C 5.7 01/27/2022      Lab Results   Component Value Date    WBC 5.9 07/10/2024    HGB 12.3 (L) 07/10/2024    HCT 36.8 (L) 07/10/2024     (H) 07/10/2024     07/10/2024      Lab Results   Component Value Date    GLUCOSE 83 07/10/2024    CALCIUM 9.3 07/10/2024     07/10/2024    K 3.8 07/10/2024    CO2 28 07/10/2024     07/10/2024    BUN 16 07/10/2024    CREATININE 1.40 07/10/2024      Lab Results   Component Value Date    INR 1.1 07/27/2023    INR 1.1 06/04/2019    PROTIME 11.2 07/27/2023    PROTIME 11.4 06/04/2019      Physical Exam  Constitutional:       General: He is not in acute distress.     Appearance: He is normal weight. He is not toxic-appearing.      Comments: Chronically ill appearing, well-nourished.   HENT:      Head: Normocephalic.      Nose: No congestion or rhinorrhea.      Mouth/Throat:      Mouth: Mucous membranes are moist.   Eyes:      General: No scleral icterus.     Extraocular Movements: Extraocular movements intact.      Pupils: Pupils are equal, round, and reactive to light.   Cardiovascular:      Rate and Rhythm: Normal rate and regular rhythm.      Pulses: Normal pulses.      Heart sounds: No murmur heard.  Pulmonary:      Effort: Pulmonary effort is normal. No respiratory distress.      Breath sounds: No wheezing or rales. "   Abdominal:      General: Bowel sounds are normal.      Palpations: Abdomen is soft.      Tenderness: There is no abdominal tenderness. There is no guarding.   Musculoskeletal:         General: Deformity present.      Cervical back: Neck supple.      Right lower leg: Edema present.      Left lower leg: Edema present.      Comments: Noted joint deformities in bilateral hands/fingers.  BLE 2+ pitting edema in bilateral feet (improved)  tibial edema has improved.  Skin:     General: Skin is warm and dry.      Capillary Refill: Capillary refill takes less than 2 seconds.      Findings: No bruising, lesion or rash.      Comments: No dependent rubor noted in BLE.    Neurological:      General: No focal deficit present.      Mental Status: He is alert and oriented to person, place, and time.   Psychiatric:         Mood and Affect: engaged in visit, smiling/laughing.      Behavior: Behavior normal.         Thought Content: Thought content normal.         Judgment: Judgment normal.   Assessment/Plan   Chronic venous insufficiency of lower extremity  Bilateral leg edema  Essential hypertension, benign  Infrarenal abdominal aortic aneurysm (AAA) without rupture (CMS-[facility])  Chronic, BLE edema with minimal improvement after trial of increased furosemide.  BP is improved this visit. Denies chest pain, dizziness, headache. Reports fatigue is slightly improved.  -rheumatoid arthritis may be contributing factor to BLE edema, perhaps causing dysfunction within the lymphatic system.   -will trial increase of furosemide to 60 mg daily, increase potassium chloride to 20 mEq daily  -continue daily amlodipine 5mg, simvastatin 20mg  -encourage heart healthy diet  -encourage BLE elevation  -trial lymphedema compression pumps if covered by insurance and patient will be able to apply independently  Chronic rhinitis  Chronic, stable.   -continue loratadine 10mg and azelastine 137mcg  Benign prostatic hyperplasia with urinary  "hesitancy  Chronic, stable.   -continue tamsulosin 0.4 mg  -monitor renal function closely  Primary osteoarthritis involving multiple joints  Rheumatoid arthritis involving multiple sites, unspecified whether rheumatoid factor present (Multi)  Neuropathy  Generalized weakness  Chronic, stable.  -continue meloxicam 7.5mg, gabapentin 300mg TID  -patient declined further PT/OT services at this time  Mild dementia without behavioral disturbance, psychotic disturbance, mood disturbance, or anxiety, unspecified dementia type (Multi)  Mixed anxiety and depressive disorder   Grief  Chronic, stable. No SI. Follows with [facility]. Is not ready to seek additional help in the home or leave home for [facility]. Patient admits to drinking bourbon daily, \"just one shot\".  -continue support services  -continue memantine 28mg, venlafaxine 50mg    Will continue with scheduled and prn House Call NP visits, as patient does not leave the home. Labs obtained this visit, patient tolerated with minimal discomfort.          Marie Verde, APRN-CNP    "

## 2024-07-10 NOTE — Clinical Note
"The patient is interested in trying lymphedema pumps, either manual compression or electronic. Will his insurance cover these? Do I need to place an order in EPIC? When I searched \"compression\", I was only able to find the stockings and I don't believe this is correct. "

## 2024-07-13 PROBLEM — J31.0 CHRONIC RHINITIS: Status: RESOLVED | Noted: 2023-11-15 | Resolved: 2024-07-13

## 2024-08-06 ENCOUNTER — TELEPHONE (OUTPATIENT)
Dept: PRIMARY CARE | Facility: CLINIC | Age: 84
End: 2024-08-06
Payer: COMMERCIAL

## 2024-08-06 NOTE — TELEPHONE ENCOUNTER
Call was placed to patient number, no answer, unable to leave message.  Call placed to emergency contact, no answer, left message requesting return call

## 2024-08-06 NOTE — TELEPHONE ENCOUNTER
Call placed to patient number as listed, [phone], no answer, left message on machine requesting return call to office. Awaiting return call.

## 2024-08-07 ENCOUNTER — OFFICE VISIT (OUTPATIENT)
Dept: PRIMARY CARE | Facility: CLINIC | Age: 84
End: 2024-08-07
Payer: MEDICARE

## 2024-08-07 VITALS
TEMPERATURE: 97.1 F | SYSTOLIC BLOOD PRESSURE: 132 MMHG | OXYGEN SATURATION: 98 % | RESPIRATION RATE: 15 BRPM | HEART RATE: 77 BPM | DIASTOLIC BLOOD PRESSURE: 80 MMHG

## 2024-08-07 DIAGNOSIS — G62.9 NEUROPATHY: ICD-10-CM

## 2024-08-07 DIAGNOSIS — F03.A0 MILD DEMENTIA WITHOUT BEHAVIORAL DISTURBANCE, PSYCHOTIC DISTURBANCE, MOOD DISTURBANCE, OR ANXIETY, UNSPECIFIED DEMENTIA TYPE (MULTI): ICD-10-CM

## 2024-08-07 DIAGNOSIS — F43.21 GRIEF: ICD-10-CM

## 2024-08-07 DIAGNOSIS — I71.43 INFRARENAL ABDOMINAL AORTIC ANEURYSM (AAA) WITHOUT RUPTURE (CMS-HCC): ICD-10-CM

## 2024-08-07 DIAGNOSIS — R39.11 BENIGN PROSTATIC HYPERPLASIA WITH URINARY HESITANCY: ICD-10-CM

## 2024-08-07 DIAGNOSIS — F41.8 MIXED ANXIETY AND DEPRESSIVE DISORDER: ICD-10-CM

## 2024-08-07 DIAGNOSIS — I10 ESSENTIAL HYPERTENSION, BENIGN: Primary | ICD-10-CM

## 2024-08-07 DIAGNOSIS — R53.1 GENERALIZED WEAKNESS: ICD-10-CM

## 2024-08-07 DIAGNOSIS — M15.9 PRIMARY OSTEOARTHRITIS INVOLVING MULTIPLE JOINTS: ICD-10-CM

## 2024-08-07 DIAGNOSIS — I87.2 CHRONIC VENOUS INSUFFICIENCY OF LOWER EXTREMITY: ICD-10-CM

## 2024-08-07 DIAGNOSIS — R60.0 BILATERAL LEG EDEMA: ICD-10-CM

## 2024-08-07 DIAGNOSIS — N40.1 BENIGN PROSTATIC HYPERPLASIA WITH URINARY HESITANCY: ICD-10-CM

## 2024-08-07 DIAGNOSIS — M06.9 RHEUMATOID ARTHRITIS INVOLVING MULTIPLE SITES, UNSPECIFIED WHETHER RHEUMATOID FACTOR PRESENT (MULTI): ICD-10-CM

## 2024-08-07 RX ORDER — UBIDECARENONE 75 MG
500 CAPSULE ORAL DAILY
Qty: 30 TABLET | Refills: 11 | Status: SHIPPED | OUTPATIENT
Start: 2024-08-07 | End: 2025-08-07

## 2024-08-07 RX ORDER — FUROSEMIDE 20 MG/1
60 TABLET ORAL DAILY
Qty: 90 TABLET | Refills: 11 | Status: SHIPPED | OUTPATIENT
Start: 2024-08-07 | End: 2025-08-07

## 2024-08-07 ASSESSMENT — PAIN SCALES - GENERAL: PAINLEVEL: 5

## 2024-08-07 NOTE — PROGRESS NOTES
"Subjective   Patient ID: Demarco Gudino is a 83 y.o. male who presents for Follow-up (PVD, BLE edema and other chronic medical conditions).    HPI   Patient seen on the couch in his private home. He is alert, oriented to person, place, time and situation. He is able to answer all questions regarding his health.      PMH: HTN, HLD, AAA, PVD, rheumatoid arthritis, neuropathy, depression/anxiety and dementia.      Demarco is standing at the kitchen sink upon my arrival, ambulates independently to the couch where he sits with his feet dependent. He is no longer active with [facility] services through [employer]. Patient resides in a single family two story home by himself. He has a limited support system as he wife resides in a LTC facility and his only other relative lives 3 hours away. Patient does not drive and has his medications delivered. He is also active with [employer] and a . Patient ambulates independently and denies any recent falls. Medications reviewed with patient, he reports compliance with all prescribed medications.      BLE edema - Reports BLE edema has not changed, he feels it has gotten worse. States \"I can't sit in the chair and put my feet up all day\" and \"I can't put the compression stockings on myself, we tried wraps but they didn't work because I don't have hand dexterity to change them, and PT is not coming anymore\". He is not able to attend a lymphedema clinic. Upon review of medications, it is discovered that Demarco is only receiving (and taking) 20 mg of lasix in his delivery prescriptions, rather than the increased dose of 60 mg which was ordered last visit.      Depression/ Dementia/ Suicide attempt Hx - Stable. Patient denies any current SI and continues to follow routinely with [facility] for mental health services. He reports that he is fatigue is slightly improved but he continues to struggle with motivation. Follows with Nithya Fitch CNP with [facility].   "   AAA - Surgical repair was originally recommended in 09/2022. Patient has not yet followed with a vascular surgeon and has no intentions to at this time.     Arthritis of multiple joints - Patient admits to chronic pain/ inflammation in his bilateral hands and decreased mobility in his left shoulder and bilateral knees, the pain is at baseline for him.     Home Visit: Medically necessary due to unsteady gait/poor balance, and Illness or condition that results in activity limitation or restriction that impacts the ability to leave home such as: equipment and /or human assistance needed to safely leave the home, patient has limited support systems to help the patient attend office visits, the office visit would require excessive physical effort or pain for the patient.       Current Outpatient Medications:     Allergy Relief, loratadine, 10 mg tablet, Take 1 tablet (10 mg) by mouth once daily., Disp: 30 tablet, Rfl: 11    amLODIPine (Norvasc) 5 mg tablet, Take 1 tablet (5 mg) by mouth once daily., Disp: 30 tablet, Rfl: 11    artificial tears, dextran-hypomel-glycerin, 0.1-0.3-0.2 % ophthalmic solution, Administer 1 drop into affected eye(s) 4 times a day as needed for dry eyes., Disp: , Rfl:     azelastine (Astelin) 137 mcg (0.1 %) nasal spray, Administer 1 spray into each nostril 2 times a day., Disp: 30 mL, Rfl: 11    budesonide EC (Entocort EC) 3 mg 24 hr capsule, Take 1 capsule (3 mg) by mouth once daily., Disp: 30 capsule, Rfl: 11    cholecalciferol (Vitamin D-3) 125 MCG (5000 UT) capsule, Take 1 capsule (125 mcg) by mouth once daily., Disp: 30 capsule, Rfl: 11    gabapentin (Neurontin) 300 mg capsule, Take 1 capsule (300 mg) by mouth 3 times a day., Disp: 90 capsule, Rfl: 5    lisinopril 20 mg tablet, Take 1 tablet (20 mg) by mouth once daily., Disp: , Rfl:     melatonin 3 mg tablet, Take 1 tablet (3 mg) by mouth once daily at bedtime., Disp: 30 tablet, Rfl: 11    meloxicam (Mobic) 7.5 mg tablet, Take 1 tablet  (7.5 mg) by mouth once daily., Disp: 30 tablet, Rfl: 11    memantine (Namenda) 28 mg capsule,sprinkle,ER 24hr, Take 1 capsule (28 mg) by mouth once daily., Disp: 30 capsule, Rfl: 11    polyethylene glycol (Glycolax, Miralax) 17 gram packet, Take 17 g by mouth once daily. Do not start before February 23, 2024., Disp: , Rfl:     polyvinyl alcohol (Liquifilm Tears) 1.4 % ophthalmic solution, Administer 2 drops into both eyes 3 times a day as needed., Disp: , Rfl:     potassium chloride CR (Klor-Con) 10 mEq ER tablet, Take 1 tablet (10 mEq) by mouth once daily. Do not crush, chew, or split., Disp: 30 tablet, Rfl: 11    simvastatin (Zocor) 20 mg tablet, Take 1 tablet (20 mg) by mouth once daily., Disp: 30 tablet, Rfl: 11    tamsulosin (Flomax) 0.4 mg 24 hr capsule, Take 1 capsule (0.4 mg) by mouth once daily., Disp: 30 capsule, Rfl: 11    traZODone (Desyrel) 100 mg tablet, Take 1 tablet (100 mg) by mouth once daily at bedtime., Disp: 30 tablet, Rfl: 11    venlafaxine (Effexor) 50 mg tablet, Take 1 tablet (50 mg) by mouth once daily in the morning. Take before meals., Disp: 30 tablet, Rfl: 11    cyanocobalamin (Vitamin B-12) 500 mcg tablet, Take 1 tablet (500 mcg) by mouth once daily., Disp: 30 tablet, Rfl: 11    furosemide (Lasix) 20 mg tablet, Take 3 tablets (60 mg) by mouth once daily. In the morning., Disp: 90 tablet, Rfl: 11     Review of Systems  Constitutional:  Positive for activity change, fatigue and fever. Negative for appetite change, chills, diaphoresis and unexpected weight change.   HENT:  Positive for congestion, postnasal drip, rhinorrhea and sinus pressure. Negative for sinus pain, sneezing, sore throat and trouble swallowing.    Eyes:  Negative for pain.        Wear glasses   Respiratory:  Positive for cough and shortness of breath. Negative for choking, chest tightness and wheezing.    Cardiovascular:  Positive for leg swelling. Negative for chest pain and palpitations.   Gastrointestinal:  Negative  "for abdominal distention, abdominal pain, blood in stool, constipation, diarrhea, nausea and vomiting.   Musculoskeletal:  Positive for arthralgias, back pain and joint swelling.   Skin:  Negative for rash and wound.   Neurological:  Negative for dizziness, tremors, seizures, syncope, facial asymmetry, speech difficulty, light-headedness, numbness and headaches.   Hematological:  Bruises/bleeds easily.   Psychiatric/Behavioral:  Positive for decreased concentration. Negative for agitation, behavioral problems, confusion, self-injury, sleep disturbance and suicidal ideas. The patient is not nervous/anxious. He admits to an occasional \"nip of bourbon\".   Objective   /80   Pulse 77   Temp 36.2 °C (97.1 °F)   Resp 15   SpO2 98% Comment: room air     Lab Results   Component Value Date    WBC 5.9 07/10/2024    HGB 12.3 (L) 07/10/2024    HCT 36.8 (L) 07/10/2024     (H) 07/10/2024     07/10/2024      Lab Results   Component Value Date    GLUCOSE 83 07/10/2024    CALCIUM 9.3 07/10/2024     07/10/2024    K 3.8 07/10/2024    CO2 28 07/10/2024     07/10/2024    BUN 16 07/10/2024    CREATININE 1.40 07/10/2024      Physical Exam  Constitutional:       General: He is not in acute distress.     Appearance: He is normal weight. He is not toxic-appearing.      Comments: Chronically ill appearing, well-nourished.   HENT:      Head: Normocephalic.      Nose: No congestion or rhinorrhea.      Mouth/Throat:      Mouth: Mucous membranes are moist.   Eyes:      General: No scleral icterus.     Extraocular Movements: Extraocular movements intact.      Pupils: Pupils are equal, round, and reactive to light.   Cardiovascular:      Rate and Rhythm: Normal rate and regular rhythm.      Pulses: Normal pulses.      Heart sounds: No murmur heard.  Pulmonary:      Effort: Pulmonary effort is normal. No respiratory distress.      Breath sounds: No wheezing or rales.   Abdominal:      General: Bowel sounds are " normal.      Palpations: Abdomen is soft.      Tenderness: There is no abdominal tenderness. There is no guarding.   Musculoskeletal:         General: Deformity present.      Cervical back: Neck supple.      Right lower leg: Edema present.      Left lower leg: Edema present.      Comments: Noted joint deformities in bilateral hands/fingers.  BLE 3+ pitting edema in BLE  Skin:     General: Skin is warm and dry.      Capillary Refill: Capillary refill takes less than 2 seconds.      Findings: No bruising, lesion or rash.      Comments: No dependent rubor noted in BLE.    Neurological:      General: No focal deficit present.      Mental Status: He is alert and oriented to person, place, and time.   Psychiatric:         Mood and Affect: engaged in visit, smiling/laughing.      Behavior: Behavior normal.         Thought Content: Thought content normal.         Judgment: Judgment normal.   Assessment/Plan   Chronic venous insufficiency of lower extremity  Bilateral leg edema  Essential hypertension, benign  Infrarenal abdominal aortic aneurysm (AAA) without rupture (CMS-HCC)  Chronic, BLE edema is unchanged. Patient has not been receiving increased dose of lasix in medication packs. BP is improved this visit. Denies chest pain, dizziness, headache. Reports fatigue is slightly improved.  -Called [facility] to clarify order  and ensure that this shipped via UPS to patient.  Increased furosemide to 60 mg daily, increased potassium chloride to 20 mEq daily  -continue daily amlodipine 5mg, simvastatin 20mg  -encourage heart healthy diet  -encourage BLE elevation. Patient is unable to get to a lymphedema clinic, I am unable to order or manage pumps for him.   Benign prostatic hyperplasia with urinary hesitancy  Chronic, stable.   -continue tamsulosin 0.4 mg  -monitor renal function closely  Primary osteoarthritis involving multiple joints  Rheumatoid arthritis involving multiple sites, unspecified whether rheumatoid  factor present (Multi)  Neuropathy  Generalized weakness  Chronic, stable.  -continue meloxicam 7.5mg, gabapentin 300mg TID  -patient declined further PT/OT services at this time  Mild dementia without behavioral disturbance, psychotic disturbance, mood disturbance, or anxiety, unspecified dementia type (Multi)  Mixed anxiety and depressive disorder   Grief  Chronic, stable. No SI. Follows with [facility]. Is not ready to seek additional help in the home or leave home for ERROL. Patient admits to drinking a small sip of bourbon daily, continues with occasional marijuana use.   -continue support services  -continue memantine 28mg, venlafaxine 50mg     Patient is stable. BLE is increased this visit, patient was not taking correct dose of lasix. Spoke with pharmacy to clarify order.  Will continue with scheduled and prn House Call NP visits, as patient does not leave the home.          Marie Verde, APRN-CNP

## 2024-08-10 RX ORDER — POTASSIUM CHLORIDE 20 MEQ/1
20 TABLET, EXTENDED RELEASE ORAL DAILY
Qty: 30 TABLET | Refills: 11 | Status: SHIPPED | OUTPATIENT
Start: 2024-08-10 | End: 2025-08-10

## 2024-08-28 ENCOUNTER — TELEPHONE (OUTPATIENT)
Dept: PRIMARY CARE | Facility: CLINIC | Age: 84
End: 2024-08-28
Payer: COMMERCIAL

## 2024-08-28 NOTE — TELEPHONE ENCOUNTER
Pt states he received his medications from [facility] pharmacy and he is not sure that he received all the medications he was supposed to. Pt asking if someone can come over and review his meds against what he received. I tried to review meds with patient but he said he couldn't review them and wanted someone to come take a look at what he received.

## 2024-08-29 NOTE — TELEPHONE ENCOUNTER
Patient updated via phone on provider's instructions to continue Lasix 60mg and Potassium 20meq daily. Call placed to [facility]. Lasix was not included in this months pack as was to early to fill. Medication will be shipped to patient separately and will then be included in next months pill pack.

## 2024-08-30 ENCOUNTER — TELEPHONE (OUTPATIENT)
Dept: PRIMARY CARE | Facility: CLINIC | Age: 84
End: 2024-08-30
Payer: COMMERCIAL

## 2024-09-03 ENCOUNTER — OFFICE VISIT (OUTPATIENT)
Dept: PRIMARY CARE | Facility: CLINIC | Age: 84
End: 2024-09-03
Payer: MEDICARE

## 2024-09-03 VITALS
OXYGEN SATURATION: 99 % | DIASTOLIC BLOOD PRESSURE: 78 MMHG | TEMPERATURE: 98.7 F | SYSTOLIC BLOOD PRESSURE: 152 MMHG | RESPIRATION RATE: 18 BRPM | HEART RATE: 78 BPM

## 2024-09-03 DIAGNOSIS — R39.11 BENIGN PROSTATIC HYPERPLASIA WITH URINARY HESITANCY: ICD-10-CM

## 2024-09-03 DIAGNOSIS — I87.2 CHRONIC VENOUS INSUFFICIENCY OF LOWER EXTREMITY: ICD-10-CM

## 2024-09-03 DIAGNOSIS — G62.9 NEUROPATHY: ICD-10-CM

## 2024-09-03 DIAGNOSIS — N18.31 STAGE 3A CHRONIC KIDNEY DISEASE (MULTI): ICD-10-CM

## 2024-09-03 DIAGNOSIS — R60.0 BILATERAL LEG EDEMA: Primary | ICD-10-CM

## 2024-09-03 DIAGNOSIS — I10 ESSENTIAL HYPERTENSION, BENIGN: ICD-10-CM

## 2024-09-03 DIAGNOSIS — N40.1 BENIGN PROSTATIC HYPERPLASIA WITH URINARY HESITANCY: ICD-10-CM

## 2024-09-03 DIAGNOSIS — M15.9 PRIMARY OSTEOARTHRITIS INVOLVING MULTIPLE JOINTS: ICD-10-CM

## 2024-09-03 DIAGNOSIS — F41.8 MIXED ANXIETY AND DEPRESSIVE DISORDER: ICD-10-CM

## 2024-09-03 DIAGNOSIS — M06.9 RHEUMATOID ARTHRITIS INVOLVING MULTIPLE SITES, UNSPECIFIED WHETHER RHEUMATOID FACTOR PRESENT (MULTI): ICD-10-CM

## 2024-09-03 DIAGNOSIS — F43.21 GRIEF: ICD-10-CM

## 2024-09-03 LAB
ALBUMIN SERPL-MCNC: 4.4 G/DL (ref 3.5–5)
ANION GAP SERPL CALC-SCNC: 14 MMOL/L
BUN SERPL-MCNC: 21 MG/DL (ref 8–25)
CALCIUM SERPL-MCNC: 9.1 MG/DL (ref 8.5–10.4)
CHLORIDE SERPL-SCNC: 104 MMOL/L (ref 97–107)
CO2 SERPL-SCNC: 24 MMOL/L (ref 24–31)
CREAT SERPL-MCNC: 1.3 MG/DL (ref 0.4–1.6)
EGFRCR SERPLBLD CKD-EPI 2021: 55 ML/MIN/1.73M*2
GLUCOSE SERPL-MCNC: 115 MG/DL (ref 65–99)
PHOSPHATE SERPL-MCNC: 3.7 MG/DL (ref 2.5–4.5)
POTASSIUM SERPL-SCNC: 4.3 MMOL/L (ref 3.4–5.1)
SODIUM SERPL-SCNC: 142 MMOL/L (ref 133–145)

## 2024-09-03 PROCEDURE — 80069 RENAL FUNCTION PANEL: CPT

## 2024-09-03 PROCEDURE — 36415 COLL VENOUS BLD VENIPUNCTURE: CPT

## 2024-09-03 NOTE — PROGRESS NOTES
Subjective   Patient ID: Demarco Gudino is a 83 y.o. male who presents for Follow-up (PVD, BLE edema).    HPI   Patient seen on the couch in his private home. He is alert, oriented to person, place, time and situation. He is able to answer all questions regarding his health.      PMH: HTN, HLD, AAA, PVD, rheumatoid arthritis, neuropathy, depression/anxiety and dementia.      Demarco is sitting at the kitchen sink upon my arrival, ambulates independently to the couch where he sits with his feet dependent. Patient resides in a single family two story home by himself. He has a limited support system as he wife resides in a LTC facility and his only other relative lives 3 hours away. Patient does not drive and has his medications delivered. He is also active with [employer] and a . Patient ambulates independently and denies any recent falls. Medications reviewed with patient, he reports compliance with all prescribed medications.      BLE edema - Reports BLE edema has improved, he feels much more comfortable and is able to walk easier. He reports that he has the increased lasix dose, it came in a separate box but will be in the main box next shipment.      Depression/ Dementia/ Suicide attempt Hx - Stable. Patient denies any current SI and continues to follow routinely with [facility] for mental health services. He reports that he is fatigue is about the same. He had company for Labor Day, his nephew and family stopped by to see him, made his day. Follows with Nithya Fitch CNP with [facility].     AAA - Surgical repair was originally recommended in 09/2022. Patient has not yet followed with a vascular surgeon and has no intentions to at this time.     Arthritis of multiple joints - Patient admits to chronic pain/ inflammation in his bilateral hands and decreased mobility in his left shoulder and bilateral knees, the pain is at baseline for him.    Home Visit: Medically necessary due to  unsteady gait/poor balance, and Illness or condition that results in activity limitation or restriction that impacts the ability to leave home such as: equipment and /or human assistance needed to safely leave the home, patient has limited support systems to help the patient attend office visits, the office visit would require excessive physical effort or pain for the patient.       Current Outpatient Medications:     Allergy Relief, loratadine, 10 mg tablet, Take 1 tablet (10 mg) by mouth once daily., Disp: 30 tablet, Rfl: 11    amLODIPine (Norvasc) 5 mg tablet, Take 1 tablet (5 mg) by mouth once daily., Disp: 30 tablet, Rfl: 11    artificial tears, dextran-hypomel-glycerin, 0.1-0.3-0.2 % ophthalmic solution, Administer 1 drop into affected eye(s) 4 times a day as needed for dry eyes., Disp: , Rfl:     azelastine (Astelin) 137 mcg (0.1 %) nasal spray, Administer 1 spray into each nostril 2 times a day., Disp: 30 mL, Rfl: 11    budesonide EC (Entocort EC) 3 mg 24 hr capsule, Take 1 capsule (3 mg) by mouth once daily., Disp: 30 capsule, Rfl: 11    cholecalciferol (Vitamin D-3) 125 MCG (5000 UT) capsule, Take 1 capsule (125 mcg) by mouth once daily., Disp: 30 capsule, Rfl: 11    cyanocobalamin (Vitamin B-12) 500 mcg tablet, Take 1 tablet (500 mcg) by mouth once daily., Disp: 30 tablet, Rfl: 11    furosemide (Lasix) 20 mg tablet, Take 3 tablets (60 mg) by mouth once daily. In the morning., Disp: 90 tablet, Rfl: 11    gabapentin (Neurontin) 300 mg capsule, Take 1 capsule (300 mg) by mouth 3 times a day., Disp: 90 capsule, Rfl: 5    lisinopril 20 mg tablet, Take 1 tablet (20 mg) by mouth once daily., Disp: , Rfl:     melatonin 3 mg tablet, Take 1 tablet (3 mg) by mouth once daily at bedtime., Disp: 30 tablet, Rfl: 11    meloxicam (Mobic) 7.5 mg tablet, Take 1 tablet (7.5 mg) by mouth once daily., Disp: 30 tablet, Rfl: 11    memantine (Namenda) 28 mg capsule,sprinkle,ER 24hr, Take 1 capsule (28 mg) by mouth once daily.,  Disp: 30 capsule, Rfl: 11    polyethylene glycol (Glycolax, Miralax) 17 gram packet, Take 17 g by mouth once daily. Do not start before February 23, 2024., Disp: , Rfl:     polyvinyl alcohol (Liquifilm Tears) 1.4 % ophthalmic solution, Administer 2 drops into both eyes 3 times a day as needed., Disp: , Rfl:     potassium chloride CR (Klor-Con M20) 20 mEq ER tablet, Take 1 tablet (20 mEq) by mouth once daily. Do not crush, chew, or split., Disp: 30 tablet, Rfl: 11    simvastatin (Zocor) 20 mg tablet, Take 1 tablet (20 mg) by mouth once daily., Disp: 30 tablet, Rfl: 11    tamsulosin (Flomax) 0.4 mg 24 hr capsule, Take 1 capsule (0.4 mg) by mouth once daily., Disp: 30 capsule, Rfl: 11    traZODone (Desyrel) 100 mg tablet, Take 1 tablet (100 mg) by mouth once daily at bedtime., Disp: 30 tablet, Rfl: 11    venlafaxine (Effexor) 50 mg tablet, Take 1 tablet (50 mg) by mouth once daily in the morning. Take before meals., Disp: 30 tablet, Rfl: 11     Review of Systems  Constitutional:  Positive for activity change, fatigue and fever. Negative for appetite change, chills, diaphoresis and unexpected weight change.   HENT:  Positive for congestion, postnasal drip, rhinorrhea and sinus pressure. Negative for sinus pain, sneezing, sore throat and trouble swallowing.    Eyes:  Negative for pain.        Wear glasses   Respiratory:  Positive for cough and shortness of breath. Negative for choking, chest tightness and wheezing.    Cardiovascular:  Positive for leg swelling. Negative for chest pain and palpitations.   Gastrointestinal:  Negative for abdominal distention, abdominal pain, blood in stool, constipation, diarrhea, nausea and vomiting.   Musculoskeletal:  Positive for arthralgias, back pain and joint swelling.   Skin:  Negative for rash and wound.   Neurological:  Negative for dizziness, tremors, seizures, syncope, facial asymmetry, speech difficulty, light-headedness, numbness and headaches.   Hematological:   "Bruises/bleeds easily.   Psychiatric/Behavioral:  Positive for decreased concentration. Negative for agitation, behavioral problems, confusion, self-injury, sleep disturbance and suicidal ideas. The patient is not nervous/anxious. He reports that his friend \"stopped by to drop off my beer for the rest of the year\".      Objective   /78   Pulse 78   Temp 37.1 °C (98.7 °F)   Resp 18   SpO2 99% Comment: room air     Lab Results   Component Value Date    WBC 5.9 07/10/2024    HGB 12.3 (L) 07/10/2024    HCT 36.8 (L) 07/10/2024     (H) 07/10/2024     07/10/2024      Lab Results   Component Value Date    GLUCOSE 115 (H) 09/03/2024    CALCIUM 9.1 09/03/2024     09/03/2024    K 4.3 09/03/2024    CO2 24 09/03/2024     09/03/2024    BUN 21 09/03/2024    CREATININE 1.30 09/03/2024      Physical Exam  Constitutional:       General: He is not in acute distress.     Appearance: He is normal weight. He is not toxic-appearing.      Comments: Chronically ill appearing, well-nourished.   HENT:      Head: Normocephalic.      Nose: No congestion or rhinorrhea.      Mouth/Throat:      Mouth: Mucous membranes are moist.   Eyes:      General: No scleral icterus.     Extraocular Movements: Extraocular movements intact.      Pupils: Pupils are equal, round, and reactive to light.   Cardiovascular:      Rate and Rhythm: Normal rate and regular rhythm.      Pulses: Normal pulses.      Heart sounds: No murmur heard.  Pulmonary:      Effort: Pulmonary effort is normal. No respiratory distress.      Breath sounds: No wheezing or rales.   Abdominal:      General: Bowel sounds are normal.      Palpations: Abdomen is soft.      Tenderness: There is no abdominal tenderness. There is no guarding.   Musculoskeletal:         General: Deformity present.      Cervical back: Neck supple.      Right lower leg: Edema present.      Left lower leg: Edema present.      Comments: Noted joint deformities in bilateral " hands/fingers.  BLE 2+ pitting edema in BLE, improved. Confined to bilateral feet and ankles. No tibial edema.  Skin:     General: Skin is warm and dry.      Capillary Refill: Capillary refill takes less than 2 seconds.      Findings: No bruising, lesion or rash.      Comments: No dependent rubor noted in BLE.    Neurological:      General: No focal deficit present.      Mental Status: He is alert and oriented to person, place, and time.   Psychiatric:         Mood and Affect: engaged in visit, smiling/laughing.      Behavior: Behavior normal.         Thought Content: Thought content normal.         Judgment: Judgment normal.   Assessment/Plan   Problem List Items Addressed This Visit             ICD-10-CM    Stage 3a chronic kidney disease (Multi) N18.31    Benign prostatic hyperplasia N40.0     Chronic, renal function slightly improved.   -continue tamsulosin 0.4 mg  -monitor renal function closely                   Essential hypertension, benign I10     Chronic, BP stable. Denies chest pain, dizziness, headache. Reports fatigue has not gotten worse.   -continue amlodipine 5 mg         Grief F43.21    Mixed anxiety and depressive disorder F41.8     Chronic, stable. No SI. Follows with [facility]. Is not ready to seek additional help in the home or leave home for care home. Patient admits to drinking alcohol daily, continues with occasional marijuana use.   -continue support services  -continue memantine 28mg, venlafaxine 50mg         Chronic venous insufficiency of lower extremity I87.2    Bilateral leg edema - Primary R60.0     Chronic. Improved with increased furosemide dose.   -continue furosemide 60 mg daily  -continue potassium 20 mEq, pending lab results  -encourage heart healthy diet  -encourage BLE elevation. Patient is unable to get to a lymphedema clinic, I am unable to order or manage pumps for him         Relevant Orders    Renal function panel (Completed)    Osteoarthritis of multiple joints M15.9     Neuropathy G62.9    Rheumatoid arthritis involving multiple sites (Multi) M06.9     Chronic, stable.  -continue meloxicam 7.5mg, gabapentin 300mg TID  -patient declined further PT/OT services at this time               Patient is improved. Labs obtained this visit, patient tolerated with minimal discomfort. Will continue with monthly House Call NP visits as he is at high risk for hospital admission due to chronic health conditions and limited support systems in place.          Marie Verde, APRN-CNP

## 2024-09-06 DIAGNOSIS — J31.0 CHRONIC RHINITIS: ICD-10-CM

## 2024-09-06 PROBLEM — N18.31 STAGE 3A CHRONIC KIDNEY DISEASE (MULTI): Status: ACTIVE | Noted: 2024-09-06

## 2024-09-06 PROBLEM — F33.2 MAJOR DEPRESSIVE DISORDER, RECURRENT SEVERE WITHOUT PSYCHOTIC FEATURES (MULTI): Status: RESOLVED | Noted: 2023-11-15 | Resolved: 2024-09-06

## 2024-09-06 RX ORDER — LORATADINE 10 MG/1
10 TABLET ORAL DAILY
Qty: 30 TABLET | Refills: 11 | Status: SHIPPED | OUTPATIENT
Start: 2024-09-06 | End: 2025-09-06

## 2024-09-07 NOTE — ASSESSMENT & PLAN NOTE
Chronic. Improved with increased furosemide dose.   -continue furosemide 60 mg daily  -continue potassium 20 mEq, pending lab results  -encourage heart healthy diet  -encourage BLE elevation. Patient is unable to get to a lymphedema clinic, I am unable to order or manage pumps for him

## 2024-09-07 NOTE — ASSESSMENT & PLAN NOTE
Chronic, BP stable. Denies chest pain, dizziness, headache. Reports fatigue has not gotten worse.   -continue amlodipine 5 mg

## 2024-09-07 NOTE — ASSESSMENT & PLAN NOTE
Chronic, stable. No SI. Follows with [facility]. Is not ready to seek additional help in the home or leave home for [facility]. Patient admits to drinking alcohol daily, continues with occasional marijuana use.   -continue support services  -continue memantine 28mg, venlafaxine 50mg

## 2024-09-07 NOTE — ASSESSMENT & PLAN NOTE
Chronic, stable.  -continue meloxicam 7.5mg, gabapentin 300mg TID  -patient declined further PT/OT services at this time

## 2024-10-08 DIAGNOSIS — G62.9 NEUROPATHY: ICD-10-CM

## 2024-10-08 RX ORDER — GABAPENTIN 300 MG/1
300 CAPSULE ORAL 3 TIMES DAILY
Qty: 90 CAPSULE | Refills: 11 | Status: SHIPPED | OUTPATIENT
Start: 2024-10-08

## 2024-10-15 ENCOUNTER — TELEPHONE (OUTPATIENT)
Dept: PRIMARY CARE | Facility: CLINIC | Age: 84
End: 2024-10-15
Payer: COMMERCIAL

## 2024-10-15 NOTE — TELEPHONE ENCOUNTER
MARINE Salazar - 10/16/24 House Calls visit confirmed with patient via phone. Patient is requesting a flu shot.

## 2024-10-16 ENCOUNTER — LAB (OUTPATIENT)
Dept: LAB | Facility: LAB | Age: 84
End: 2024-10-16
Payer: COMMERCIAL

## 2024-10-16 ENCOUNTER — OFFICE VISIT (OUTPATIENT)
Dept: PRIMARY CARE | Facility: CLINIC | Age: 84
End: 2024-10-16
Payer: MEDICARE

## 2024-10-16 VITALS
OXYGEN SATURATION: 98 % | RESPIRATION RATE: 23 BRPM | TEMPERATURE: 98 F | SYSTOLIC BLOOD PRESSURE: 170 MMHG | DIASTOLIC BLOOD PRESSURE: 90 MMHG | HEART RATE: 62 BPM

## 2024-10-16 DIAGNOSIS — R60.0 BILATERAL LEG EDEMA: Primary | ICD-10-CM

## 2024-10-16 DIAGNOSIS — N18.31 STAGE 3A CHRONIC KIDNEY DISEASE (MULTI): ICD-10-CM

## 2024-10-16 DIAGNOSIS — E78.5 HYPERLIPIDEMIA, UNSPECIFIED HYPERLIPIDEMIA TYPE: ICD-10-CM

## 2024-10-16 DIAGNOSIS — I71.43 INFRARENAL ABDOMINAL AORTIC ANEURYSM (AAA) WITHOUT RUPTURE (CMS-HCC): ICD-10-CM

## 2024-10-16 DIAGNOSIS — N40.1 BENIGN PROSTATIC HYPERPLASIA WITH URINARY HESITANCY: ICD-10-CM

## 2024-10-16 DIAGNOSIS — G62.9 NEUROPATHY: ICD-10-CM

## 2024-10-16 DIAGNOSIS — R39.11 BENIGN PROSTATIC HYPERPLASIA WITH URINARY HESITANCY: ICD-10-CM

## 2024-10-16 DIAGNOSIS — I10 ESSENTIAL HYPERTENSION, BENIGN: ICD-10-CM

## 2024-10-16 DIAGNOSIS — I87.2 CHRONIC VENOUS INSUFFICIENCY OF LOWER EXTREMITY: ICD-10-CM

## 2024-10-16 DIAGNOSIS — F03.A0 MILD DEMENTIA WITHOUT BEHAVIORAL DISTURBANCE, PSYCHOTIC DISTURBANCE, MOOD DISTURBANCE, OR ANXIETY, UNSPECIFIED DEMENTIA TYPE: ICD-10-CM

## 2024-10-16 DIAGNOSIS — E55.9 VITAMIN D DEFICIENCY: ICD-10-CM

## 2024-10-16 LAB
ALBUMIN SERPL BCP-MCNC: 4.2 G/DL (ref 3.4–5)
ALP SERPL-CCNC: 89 U/L (ref 33–136)
ALT SERPL W P-5'-P-CCNC: 9 U/L (ref 10–52)
ANION GAP SERPL CALCULATED.3IONS-SCNC: 12 MMOL/L (ref 10–20)
AST SERPL W P-5'-P-CCNC: 13 U/L (ref 9–39)
BASOPHILS # BLD AUTO: 0.05 X10*3/UL (ref 0–0.1)
BASOPHILS NFR BLD AUTO: 0.8 %
BILIRUB SERPL-MCNC: 0.5 MG/DL (ref 0–1.2)
BUN SERPL-MCNC: 19 MG/DL (ref 6–23)
CALCIUM SERPL-MCNC: 9.3 MG/DL (ref 8.6–10.3)
CHLORIDE SERPL-SCNC: 104 MMOL/L (ref 98–107)
CHOLEST SERPL-MCNC: 163 MG/DL (ref 0–199)
CHOLEST/HDLC SERPL: 2.5 {RATIO}
CO2 SERPL-SCNC: 30 MMOL/L (ref 21–32)
CREAT SERPL-MCNC: 1.25 MG/DL (ref 0.5–1.3)
EGFRCR SERPLBLD CKD-EPI 2021: 57 ML/MIN/1.73M*2
EOSINOPHIL # BLD AUTO: 0.1 X10*3/UL (ref 0–0.4)
EOSINOPHIL NFR BLD AUTO: 1.7 %
ERYTHROCYTE [DISTWIDTH] IN BLOOD BY AUTOMATED COUNT: 12.8 % (ref 11.5–14.5)
GLUCOSE SERPL-MCNC: 107 MG/DL (ref 74–99)
HCT VFR BLD AUTO: 38.4 % (ref 41–52)
HDLC SERPL-MCNC: 64.6 MG/DL
HGB BLD-MCNC: 13.1 G/DL (ref 13.5–17.5)
IMM GRANULOCYTES # BLD AUTO: 0.04 X10*3/UL (ref 0–0.5)
IMM GRANULOCYTES NFR BLD AUTO: 0.7 % (ref 0–0.9)
LDLC SERPL CALC-MCNC: 86 MG/DL
LYMPHOCYTES # BLD AUTO: 1.02 X10*3/UL (ref 0.8–3)
LYMPHOCYTES NFR BLD AUTO: 17.1 %
MAGNESIUM SERPL-MCNC: 2.43 MG/DL (ref 1.6–2.4)
MCH RBC QN AUTO: 34.4 PG (ref 26–34)
MCHC RBC AUTO-ENTMCNC: 34.1 G/DL (ref 32–36)
MCV RBC AUTO: 101 FL (ref 80–100)
MONOCYTES # BLD AUTO: 0.41 X10*3/UL (ref 0.05–0.8)
MONOCYTES NFR BLD AUTO: 6.9 %
NEUTROPHILS # BLD AUTO: 4.36 X10*3/UL (ref 1.6–5.5)
NEUTROPHILS NFR BLD AUTO: 72.8 %
NON HDL CHOLESTEROL: 98 MG/DL (ref 0–149)
NRBC BLD-RTO: 0 /100 WBCS (ref 0–0)
PLATELET # BLD AUTO: 186 X10*3/UL (ref 150–450)
POTASSIUM SERPL-SCNC: 3.7 MMOL/L (ref 3.5–5.3)
PROT SERPL-MCNC: 7 G/DL (ref 6.4–8.2)
RBC # BLD AUTO: 3.81 X10*6/UL (ref 4.5–5.9)
SODIUM SERPL-SCNC: 142 MMOL/L (ref 136–145)
TRIGL SERPL-MCNC: 60 MG/DL (ref 0–149)
TSH SERPL-ACNC: 2.08 MIU/L (ref 0.44–3.98)
VLDL: 12 MG/DL (ref 0–40)
WBC # BLD AUTO: 6 X10*3/UL (ref 4.4–11.3)

## 2024-10-16 PROCEDURE — 36415 COLL VENOUS BLD VENIPUNCTURE: CPT

## 2024-10-16 PROCEDURE — 85025 COMPLETE CBC W/AUTO DIFF WBC: CPT

## 2024-10-16 PROCEDURE — 84153 ASSAY OF PSA TOTAL: CPT

## 2024-10-16 PROCEDURE — 80053 COMPREHEN METABOLIC PANEL: CPT

## 2024-10-16 PROCEDURE — 80061 LIPID PANEL: CPT

## 2024-10-16 PROCEDURE — 84443 ASSAY THYROID STIM HORMONE: CPT

## 2024-10-16 PROCEDURE — 83735 ASSAY OF MAGNESIUM: CPT

## 2024-10-16 PROCEDURE — 82306 VITAMIN D 25 HYDROXY: CPT

## 2024-10-16 PROCEDURE — 82607 VITAMIN B-12: CPT

## 2024-10-16 NOTE — PROGRESS NOTES
Subjective   Patient ID: Demarco Gudino is a 83 y.o. male who presents for Follow-up (PVD, BLE edema, routine labs). And other chronic medical conditions.    HPI   Patient seen on the couch in his private home. He is alert, oriented to person, place, time and situation. He is able to answer all questions regarding his health.      PMH: HTN, HLD, AAA, PVD, rheumatoid arthritis, neuropathy, depression/anxiety and dementia.      Demarco is sitting at the kitchen sink upon my arrival, ambulates independently to the couch where he sits with his feet dependent. Patient resides in a single family two story home by himself. He has a limited support system as he wife resides in a LTC facility and his only other relative lives 3 hours away. Patient does not drive and has his medications delivered. He is also active with [employer] [facility] [employer] [facility] and a . Patient ambulates independently and denies any recent falls. Medications reviewed with patient, he reports compliance with all prescribed medications.      BLE edema/PVD - Reports BLE edema has remained the same. The increased lasix dose effect seems to have leveled off.    Depression/ Dementia/ Suicide attempt Hx - Stable. Patient denies any current SI and continues to follow routinely with [facility] for mental health services. He reports that he is fatigue is about the same. Follows with Nithya Fitch CNP with [facility].     AAA - Surgical repair was originally recommended in 09/2022. Patient has not yet followed with a vascular surgeon and has no intentions to at this time.     Arthritis of multiple joints - Patient admits to chronic pain/ inflammation in his bilateral hands and decreased mobility in his left shoulder and bilateral knees, the pain is at baseline for him.       Home Visit: Medically necessary due to unsteady gait/poor balance, and patient has limited support systems to help the patient attend office visits.      Current Outpatient  Medications:     Allergy Relief, loratadine, 10 mg tablet, Take 1 tablet (10 mg) by mouth once daily., Disp: 30 tablet, Rfl: 11    amLODIPine (Norvasc) 5 mg tablet, Take 1 tablet (5 mg) by mouth once daily., Disp: 30 tablet, Rfl: 11    artificial tears, dextran-hypomel-glycerin, 0.1-0.3-0.2 % ophthalmic solution, Administer 1 drop into affected eye(s) 4 times a day as needed for dry eyes., Disp: , Rfl:     azelastine (Astelin) 137 mcg (0.1 %) nasal spray, Administer 1 spray into each nostril 2 times a day., Disp: 30 mL, Rfl: 11    budesonide EC (Entocort EC) 3 mg 24 hr capsule, Take 1 capsule (3 mg) by mouth once daily., Disp: 30 capsule, Rfl: 11    cholecalciferol (Vitamin D-3) 125 MCG (5000 UT) capsule, Take 1 capsule (125 mcg) by mouth once daily., Disp: 30 capsule, Rfl: 11    cyanocobalamin (Vitamin B-12) 500 mcg tablet, Take 1 tablet (500 mcg) by mouth once daily., Disp: 30 tablet, Rfl: 11    furosemide (Lasix) 20 mg tablet, Take 3 tablets (60 mg) by mouth once daily. In the morning., Disp: 90 tablet, Rfl: 11    gabapentin (Neurontin) 300 mg capsule, Take 1 capsule (300 mg) by mouth 3 times a day., Disp: 90 capsule, Rfl: 11    lisinopril 20 mg tablet, Take 1 tablet (20 mg) by mouth once daily., Disp: , Rfl:     melatonin 3 mg tablet, Take 1 tablet (3 mg) by mouth once daily at bedtime., Disp: 30 tablet, Rfl: 11    meloxicam (Mobic) 7.5 mg tablet, Take 1 tablet (7.5 mg) by mouth once daily., Disp: 30 tablet, Rfl: 11    memantine (Namenda) 28 mg capsule,sprinkle,ER 24hr, Take 1 capsule (28 mg) by mouth once daily., Disp: 30 capsule, Rfl: 11    polyethylene glycol (Glycolax, Miralax) 17 gram packet, Take 17 g by mouth once daily. Do not start before February 23, 2024., Disp: , Rfl:     polyvinyl alcohol (Liquifilm Tears) 1.4 % ophthalmic solution, Administer 2 drops into both eyes 3 times a day as needed., Disp: , Rfl:     potassium chloride CR (Klor-Con M20) 20 mEq ER tablet, Take 1 tablet (20 mEq) by mouth once  daily. Do not crush, chew, or split., Disp: 30 tablet, Rfl: 11    simvastatin (Zocor) 20 mg tablet, Take 1 tablet (20 mg) by mouth once daily., Disp: 30 tablet, Rfl: 11    tamsulosin (Flomax) 0.4 mg 24 hr capsule, Take 1 capsule (0.4 mg) by mouth once daily., Disp: 30 capsule, Rfl: 11    traZODone (Desyrel) 100 mg tablet, Take 1 tablet (100 mg) by mouth once daily at bedtime., Disp: 30 tablet, Rfl: 11    venlafaxine (Effexor) 50 mg tablet, Take 1 tablet (50 mg) by mouth once daily in the morning. Take before meals., Disp: 30 tablet, Rfl: 11     Review of Systems  Constitutional:  Positive for activity change, fatigue and fever. Negative for appetite change, chills, diaphoresis and unexpected weight change.   HENT:  Positive for congestion, postnasal drip, rhinorrhea and sinus pressure. Negative for sinus pain, sneezing, sore throat and trouble swallowing.    Eyes:  Negative for pain.        Wear glasses   Respiratory:  Positive for cough and shortness of breath. Negative for choking, chest tightness and wheezing.    Cardiovascular:  Positive for leg swelling. Negative for chest pain and palpitations.   Gastrointestinal:  Negative for abdominal distention, abdominal pain, blood in stool, constipation, diarrhea, nausea and vomiting.   Musculoskeletal:  Positive for arthralgias, back pain and joint swelling.   Skin:  Negative for rash and wound.   Neurological:  Negative for dizziness, tremors, seizures, syncope, facial asymmetry, speech difficulty, light-headedness, numbness and headaches.   Hematological:  Bruises/bleeds easily.   Psychiatric/Behavioral:  Positive for decreased concentration. Negative for agitation, behavioral problems, confusion, self-injury, sleep disturbance and suicidal ideas. The patient is not nervous/anxious.      Objective   /90   Pulse 62   Temp 36.7 °C (98 °F)   Resp 23   SpO2 98% Comment: room air      Physical Exam  Constitutional:       General: He is not in acute distress.      Appearance: He is normal weight. He is not toxic-appearing.      Comments: Chronically ill appearing, well-nourished.   HENT:      Head: Normocephalic.      Nose: No congestion or rhinorrhea.      Mouth/Throat:      Mouth: Mucous membranes are moist.   Eyes:      General: No scleral icterus.     Extraocular Movements: Extraocular movements intact.      Pupils: Pupils are equal, round, and reactive to light.   Cardiovascular:      Rate and Rhythm: Normal rate and regular rhythm.      Pulses: Normal pulses.      Heart sounds: No murmur heard.  Pulmonary:      Effort: Pulmonary effort is normal. No respiratory distress.      Breath sounds: No wheezing or rales.   Abdominal:      General: Bowel sounds are normal.      Palpations: Abdomen is soft.      Tenderness: There is no abdominal tenderness. There is no guarding.   Musculoskeletal:         General: Deformity present.      Cervical back: Neck supple.      Right lower leg: Edema present.      Left lower leg: Edema present.      Comments: Noted joint deformities in bilateral hands/fingers.  BLE 2+ pitting edema in BLE, unchanged. Confined to bilateral feet and ankles. No tibial edema.  Skin:     General: Skin is warm and dry.      Capillary Refill: Capillary refill takes less than 2 seconds.      Findings: No bruising, lesion or rash.      Comments: No dependent rubor noted in BLE.    Neurological:      General: No focal deficit present.      Mental Status: He is alert and oriented to person, place, and time.   Psychiatric:         Mood and Affect: engaged in visit, smiling/laughing.      Behavior: Behavior normal.         Thought Content: Thought content normal.         Judgment: Judgment normal.   Assessment/Plan       Patient is stable. Routine labs obtained this visit, patient tolerated with minimal discomfort. If RFP improved, will decrease furosemide dose to 40 mg daily, as increased dose was not particularly effective. Will consider switching to torsemide for  longeR effect duration.        Marie Verde, APRN-CNP

## 2024-10-17 LAB
25(OH)D3 SERPL-MCNC: 77 NG/ML (ref 30–100)
PSA SERPL-MCNC: 0.64 NG/ML
VIT B12 SERPL-MCNC: 295 PG/ML (ref 211–911)

## 2024-10-25 PROBLEM — Z23 NEED FOR INFLUENZA VACCINATION: Status: ACTIVE | Noted: 2024-10-25

## 2024-11-14 ENCOUNTER — TELEPHONE (OUTPATIENT)
Dept: PRIMARY CARE | Facility: CLINIC | Age: 84
End: 2024-11-14
Payer: COMMERCIAL

## 2024-11-15 ENCOUNTER — OFFICE VISIT (OUTPATIENT)
Dept: PRIMARY CARE | Facility: CLINIC | Age: 84
End: 2024-11-15
Payer: MEDICARE

## 2024-11-15 VITALS
TEMPERATURE: 98 F | RESPIRATION RATE: 21 BRPM | DIASTOLIC BLOOD PRESSURE: 82 MMHG | HEART RATE: 84 BPM | OXYGEN SATURATION: 97 % | SYSTOLIC BLOOD PRESSURE: 128 MMHG

## 2024-11-15 DIAGNOSIS — F41.8 MIXED ANXIETY AND DEPRESSIVE DISORDER: ICD-10-CM

## 2024-11-15 DIAGNOSIS — I87.2 CHRONIC VENOUS INSUFFICIENCY OF LOWER EXTREMITY: ICD-10-CM

## 2024-11-15 DIAGNOSIS — G62.9 NEUROPATHY: ICD-10-CM

## 2024-11-15 DIAGNOSIS — M06.9 RHEUMATOID ARTHRITIS INVOLVING MULTIPLE SITES, UNSPECIFIED WHETHER RHEUMATOID FACTOR PRESENT (MULTI): ICD-10-CM

## 2024-11-15 DIAGNOSIS — N40.1 BENIGN PROSTATIC HYPERPLASIA WITH URINARY HESITANCY: ICD-10-CM

## 2024-11-15 DIAGNOSIS — I71.43 INFRARENAL ABDOMINAL AORTIC ANEURYSM (AAA) WITHOUT RUPTURE (CMS-HCC): Primary | ICD-10-CM

## 2024-11-15 DIAGNOSIS — I10 ESSENTIAL HYPERTENSION, BENIGN: ICD-10-CM

## 2024-11-15 DIAGNOSIS — F43.21 GRIEF: ICD-10-CM

## 2024-11-15 DIAGNOSIS — R39.11 BENIGN PROSTATIC HYPERPLASIA WITH URINARY HESITANCY: ICD-10-CM

## 2024-11-15 DIAGNOSIS — N18.31 STAGE 3A CHRONIC KIDNEY DISEASE (MULTI): ICD-10-CM

## 2024-11-15 DIAGNOSIS — M15.0 PRIMARY OSTEOARTHRITIS INVOLVING MULTIPLE JOINTS: ICD-10-CM

## 2024-11-15 DIAGNOSIS — E78.5 HYPERLIPIDEMIA, UNSPECIFIED HYPERLIPIDEMIA TYPE: ICD-10-CM

## 2024-11-15 PROCEDURE — 99349 HOME/RES VST EST MOD MDM 40: CPT

## 2024-11-15 PROCEDURE — 3079F DIAST BP 80-89 MM HG: CPT

## 2024-11-15 PROCEDURE — 1159F MED LIST DOCD IN RCRD: CPT

## 2024-11-15 PROCEDURE — 3074F SYST BP LT 130 MM HG: CPT

## 2024-11-15 PROCEDURE — 1160F RVW MEDS BY RX/DR IN RCRD: CPT

## 2024-11-15 NOTE — PROGRESS NOTES
Subjective   Patient ID: Demarco Gudino is a 83 y.o. male who presents for Follow-up (Chronic medical conditions).    HPI   Patient seen on the couch in his private home. He is alert, oriented to person, place, time and situation. He is able to answer all questions regarding his health.      PMH: HTN, HLD, AAA, PVD, rheumatoid arthritis, neuropathy, depression/anxiety and dementia.      Demarco is sitting at the kitchen sink upon my arrival, ambulates independently to the couch where he sits with his feet dependent. He continues to reside in a single family two story home by himself. He has a limited support system as he wife resides in a LTC facility and his only other relative lives 3 hours away. Patient does not drive and has his medications delivered. He is also active with [employer] [facility] [facility] and a . Patient ambulates independently and denies any recent falls. Medications reviewed with patient. He reports that he does not always take 60 mg of lasix every day.       BLE edema/PVD - Reports BLE edema has not changed, he is able to ambulate inside without too much difficulty. He reports that he does sit much of the day in his kitchen with his feet dependent. He is not able to apply compression stockings or wrap his legs due to stiffness and loss of dexterity in both of his hands.     Depression/ Dementia/ Suicide attempt Hx - Stable. Patient denies any current SI. Today, he shares with me some history regarding the loss of his two children, a son who passed from a ruptured AAA, and a daughter who passed away from MS. He cared for his daughter in his home when she declined.  He reports that he is fatigue is about the same. Follows with Nithya Fitch CNP with [facility].     AAA - Surgical repair was originally recommended in 09/2022. Patient has not yet followed with a vascular surgeon and has no intentions to do so at this time.     Arthritis of multiple joints - Patient admits to chronic pain/  inflammation in his bilateral hands and decreased mobility in his left shoulder and bilateral knees, the pain is at baseline for him.     Home Visit: Medically necessary due to unsteady gait/poor balance, and Illness or condition that results in activity limitation or restriction that impacts the ability to leave home such as: equipment and /or human assistance needed to safely leave the home, patient has limited support systems to help the patient attend office visits, the office visit would require excessive physical effort or pain for the patient.       Current Outpatient Medications:     Allergy Relief, loratadine, 10 mg tablet, Take 1 tablet (10 mg) by mouth once daily., Disp: 30 tablet, Rfl: 11    amLODIPine (Norvasc) 5 mg tablet, Take 1 tablet (5 mg) by mouth once daily., Disp: 30 tablet, Rfl: 11    artificial tears, dextran-hypomel-glycerin, 0.1-0.3-0.2 % ophthalmic solution, Administer 1 drop into affected eye(s) 4 times a day as needed for dry eyes., Disp: , Rfl:     azelastine (Astelin) 137 mcg (0.1 %) nasal spray, Administer 1 spray into each nostril 2 times a day., Disp: 30 mL, Rfl: 11    budesonide EC (Entocort EC) 3 mg 24 hr capsule, Take 1 capsule (3 mg) by mouth once daily., Disp: 30 capsule, Rfl: 11    cholecalciferol (Vitamin D-3) 125 MCG (5000 UT) capsule, Take 1 capsule (125 mcg) by mouth once daily., Disp: 30 capsule, Rfl: 11    cyanocobalamin (Vitamin B-12) 500 mcg tablet, Take 1 tablet (500 mcg) by mouth once daily., Disp: 30 tablet, Rfl: 11    furosemide (Lasix) 20 mg tablet, Take 3 tablets (60 mg) by mouth once daily. In the morning., Disp: 90 tablet, Rfl: 11    gabapentin (Neurontin) 300 mg capsule, Take 1 capsule (300 mg) by mouth 3 times a day., Disp: 90 capsule, Rfl: 11    lisinopril 20 mg tablet, Take 1 tablet (20 mg) by mouth once daily., Disp: , Rfl:     melatonin 3 mg tablet, Take 1 tablet (3 mg) by mouth once daily at bedtime., Disp: 30 tablet, Rfl: 11    meloxicam (Mobic) 7.5 mg  tablet, Take 1 tablet (7.5 mg) by mouth once daily., Disp: 30 tablet, Rfl: 11    memantine (Namenda) 28 mg capsule,sprinkle,ER 24hr, Take 1 capsule (28 mg) by mouth once daily., Disp: 30 capsule, Rfl: 11    polyethylene glycol (Glycolax, Miralax) 17 gram packet, Take 17 g by mouth once daily. Do not start before February 23, 2024., Disp: , Rfl:     polyvinyl alcohol (Liquifilm Tears) 1.4 % ophthalmic solution, Administer 2 drops into both eyes 3 times a day as needed., Disp: , Rfl:     potassium chloride CR (Klor-Con M20) 20 mEq ER tablet, Take 1 tablet (20 mEq) by mouth once daily. Do not crush, chew, or split., Disp: 30 tablet, Rfl: 11    simvastatin (Zocor) 20 mg tablet, Take 1 tablet (20 mg) by mouth once daily., Disp: 30 tablet, Rfl: 11    tamsulosin (Flomax) 0.4 mg 24 hr capsule, Take 1 capsule (0.4 mg) by mouth once daily., Disp: 30 capsule, Rfl: 11    traZODone (Desyrel) 100 mg tablet, Take 1 tablet (100 mg) by mouth once daily at bedtime., Disp: 30 tablet, Rfl: 11    venlafaxine (Effexor) 50 mg tablet, Take 1 tablet (50 mg) by mouth once daily in the morning. Take before meals., Disp: 30 tablet, Rfl: 11     Review of Systems   Constitutional:  Positive for fatigue. Negative for activity change, appetite change, chills, diaphoresis, fever and unexpected weight change.        Fatigue remains at baseline.    HENT:  Positive for congestion, postnasal drip, rhinorrhea and sinus pressure. Negative for dental problem, ear pain, mouth sores, nosebleeds, sinus pain, sneezing, sore throat, tinnitus and trouble swallowing.         Chronic sinus issues, no change from baseline.   Eyes:  Negative for visual disturbance.   Respiratory:  Negative for cough, choking, chest tightness, shortness of breath, wheezing and stridor.    Cardiovascular:  Positive for leg swelling. Negative for chest pain and palpitations.   Gastrointestinal:  Negative for abdominal distention, abdominal pain, blood in stool, constipation, diarrhea,  nausea and vomiting.   Endocrine: Negative for cold intolerance, heat intolerance, polydipsia, polyphagia and polyuria.   Genitourinary:  Negative for difficulty urinating, dysuria, flank pain, frequency and hematuria.   Musculoskeletal:  Positive for arthralgias, back pain and joint swelling.   Skin:  Negative for rash and wound.   Neurological:  Negative for dizziness, tremors, seizures, syncope, facial asymmetry, speech difficulty, weakness, light-headedness and headaches.        Peripheral neuropathy is at baseline.   Hematological:  Does not bruise/bleed easily.   Psychiatric/Behavioral:  Positive for dysphoric mood. Negative for agitation, behavioral problems, confusion, decreased concentration, self-injury, sleep disturbance and suicidal ideas. The patient is not nervous/anxious.        Objective   /82   Pulse 84   Temp 36.7 °C (98 °F)   Resp 21   SpO2 97% Comment: room air  Lab Results   Component Value Date    HGBA1C 5.7 01/27/2022      Lab Results   Component Value Date    WBC 6.0 10/16/2024    HGB 13.1 (L) 10/16/2024    HCT 38.4 (L) 10/16/2024     (H) 10/16/2024     10/16/2024      Lab Results   Component Value Date    GLUCOSE 107 (H) 10/16/2024    CALCIUM 9.3 10/16/2024     10/16/2024    K 3.7 10/16/2024    CO2 30 10/16/2024     10/16/2024    BUN 19 10/16/2024    CREATININE 1.25 10/16/2024      Lab Results   Component Value Date    INR 1.1 07/27/2023    INR 1.1 06/04/2019    PROTIME 11.2 07/27/2023    PROTIME 11.4 06/04/2019          Physical Exam  Constitutional:       General: He is not in acute distress.     Appearance: He is normal weight. He is not toxic-appearing.      Comments: Chronically ill appearing, well-groomed and well-nourished.   HENT:      Head: Normocephalic.      Nose: Congestion present. No rhinorrhea.      Mouth/Throat:      Mouth: Mucous membranes are moist.      Pharynx: Oropharynx is clear. No oropharyngeal exudate.   Eyes:      General: No  scleral icterus.     Extraocular Movements: Extraocular movements intact.      Conjunctiva/sclera: Conjunctivae normal.      Pupils: Pupils are equal, round, and reactive to light.   Cardiovascular:      Rate and Rhythm: Normal rate and regular rhythm.      Pulses: Normal pulses.      Heart sounds: No murmur heard.     Comments: BLE edema: RLE 3+ pitting, LLE 1+ pitting  Pulmonary:      Effort: Pulmonary effort is normal. No respiratory distress.      Breath sounds: Normal breath sounds. No wheezing or rales.   Abdominal:      General: Bowel sounds are normal. There is no distension.      Palpations: Abdomen is soft.      Tenderness: There is no abdominal tenderness. There is no guarding.   Genitourinary:     Comments: Did not assess this region.  Musculoskeletal:      Cervical back: Neck supple.      Right lower leg: Edema present.      Left lower leg: Edema present.      Comments: Noted joint deformities in bilateral hands/fingers.  BLE pitting edema. Confined to bilateral feet and ankles. No tibial edema.     Skin:     General: Skin is warm and dry.      Capillary Refill: Capillary refill takes 2 to 3 seconds.      Findings: Bruising present.      Comments: Ecchymosis to RUE and right hand, patient denies known injury or fall.    Neurological:      General: No focal deficit present.      Mental Status: He is alert and oriented to person, place, and time.   Psychiatric:         Mood and Affect: Mood normal.         Behavior: Behavior normal.         Thought Content: Thought content normal.         Judgment: Judgment normal.         Assessment/Plan   Musculoskeletal and Injuries  RA/OA/neuropathy - Chronic, stable.  -continue meloxicam 7.5mg, gabapentin 300mg TID  -patient declined further PT/OT services at this time    Mental Health  Grief/Depression - Chronic, stable. Both of his children are , a son at age 55 years to a ruptured AAA and a daughter to MS. His wife currently resides in a SNF. No SI.  "Follows with [facility]. Is not ready to seek additional help in the home or leave home for [facility]. Patient admits to drinking alcohol daily, continues with occasional marijuana use.   -continue support services  -continue memantine 28mg, venlafaxine 50 mg, trazodone 100 mg        Genitourinary and Reproductive  BPH/CKD3a - chronic, stable. GFR 57 mL/min/1.73m*2. PSA 0.64 ng/mL  -continue tamsulosin 0.4 mg  -renally dose medications  -avoid nephrotoxic agents  -routinely monitor renal function    Cardiac and Vasculature  HTN/HLD/Venous Insufficiency/BLE edema - chronic, BP stable this visit. BLE increased. He is unable to independently apply compression stockings or wrap his legs d/t RA/OA in bilateral hands.  Lab Results   Component Value Date    CHOL 163 10/16/2024    CHOL 145 07/10/2024    CHOL 121 (L) 07/27/2023     Lab Results   Component Value Date    HDL 64.6 10/16/2024    HDL 63.0 07/10/2024    HDL 62 07/27/2023     Lab Results   Component Value Date    LDLCALC 86 10/16/2024    LDLCALC 70 07/10/2024    LDLCALC 50 (L) 07/27/2023     Lab Results   Component Value Date    TRIG 60 10/16/2024    TRIG 60 07/10/2024    TRIG 45 07/27/2023     No components found for: \"CHOLHDL\"  -continue amlodipine 5 mg, furosemide 60 mg, lisinopril 20 mg, potassium chloride 20 mEq, and simvastatin 20 mg  -continue to encourage heart healthy diet and abstaining from alcohol  -continue to encourage elevation of BLE    Patient is stable, but BLE edema has increased since last visit. Patient reports that he does not always take lasix every day, only when he feels he needs it. Encourage compliance with lasix and elevation of BLE. Will continue with [facility] visits as patient does not drive and is at high risk for admissions due to chronic medical conditions and limited support systems.            Marie Verde, APRN-CNP    "

## 2024-11-18 ASSESSMENT — ENCOUNTER SYMPTOMS
AGITATION: 0
TREMORS: 0
SHORTNESS OF BREATH: 0
CHILLS: 0
ABDOMINAL DISTENTION: 0
SLEEP DISTURBANCE: 0
SINUS PRESSURE: 1
LIGHT-HEADEDNESS: 0
DIARRHEA: 0
DYSPHORIC MOOD: 1
PALPITATIONS: 0
DIAPHORESIS: 0
POLYPHAGIA: 0
WHEEZING: 0
UNEXPECTED WEIGHT CHANGE: 0
WEAKNESS: 0
HEADACHES: 0
COUGH: 0
BACK PAIN: 1
VOMITING: 0
SINUS PAIN: 0
DIFFICULTY URINATING: 0
NAUSEA: 0
BRUISES/BLEEDS EASILY: 0
NERVOUS/ANXIOUS: 0
DECREASED CONCENTRATION: 0
SPEECH DIFFICULTY: 0
BLOOD IN STOOL: 0
ABDOMINAL PAIN: 0
CONFUSION: 0
ARTHRALGIAS: 1
HEMATURIA: 0
SORE THROAT: 0
FLANK PAIN: 0
CONSTIPATION: 0
FEVER: 0
SEIZURES: 0
FREQUENCY: 0
POLYDIPSIA: 0
TROUBLE SWALLOWING: 0
RHINORRHEA: 1
STRIDOR: 0
ACTIVITY CHANGE: 0
FACIAL ASYMMETRY: 0
WOUND: 0
JOINT SWELLING: 1
APPETITE CHANGE: 0
CHOKING: 0
DIZZINESS: 0
FATIGUE: 1
DYSURIA: 0
CHEST TIGHTNESS: 0

## 2024-11-19 NOTE — ASSESSMENT & PLAN NOTE
RA/OA/neuropathy - Chronic, stable.  -continue meloxicam 7.5mg, gabapentin 300mg TID  -patient declined further PT/OT services at this time

## 2024-11-19 NOTE — ASSESSMENT & PLAN NOTE
Grief/Depression - Chronic, stable. Both of his children are , a son at age 55 years to a ruptured AAA and a daughter to MS. His wife currently resides in a SNF. No SI. Follows with Health system. Is not ready to seek additional help in the home or leave home for [facility]. Patient admits to drinking alcohol daily, continues with occasional marijuana use.   -continue support services  -continue memantine 28mg, venlafaxine 50 mg, trazodone 100 mg

## 2024-11-19 NOTE — ASSESSMENT & PLAN NOTE
"HTN/HLD/Venous Insufficiency/BLE edema - chronic, BP stable this visit. BLE increased. He is unable to independently apply compression stockings or wrap his legs d/t RA/OA in bilateral hands.  Lab Results   Component Value Date    CHOL 163 10/16/2024    CHOL 145 07/10/2024    CHOL 121 (L) 07/27/2023     Lab Results   Component Value Date    HDL 64.6 10/16/2024    HDL 63.0 07/10/2024    HDL 62 07/27/2023     Lab Results   Component Value Date    LDLCALC 86 10/16/2024    LDLCALC 70 07/10/2024    LDLCALC 50 (L) 07/27/2023     Lab Results   Component Value Date    TRIG 60 10/16/2024    TRIG 60 07/10/2024    TRIG 45 07/27/2023     No components found for: \"CHOLHDL\"  -continue amlodipine 5 mg, furosemide 60 mg, lisinopril 20 mg, potassium chloride 20 mEq, and simvastatin 20 mg  -continue to encourage heart healthy diet and abstaining from alcohol  -continue to encourage elevation of BLE  "

## 2024-11-19 NOTE — ASSESSMENT & PLAN NOTE
BPH/CKD3a - chronic, stable. GFR 57 mL/min/1.73m*2. PSA 0.64 ng/mL  -continue tamsulosin 0.4 mg  -renally dose medications  -avoid nephrotoxic agents  -routinely monitor renal function

## 2024-12-05 DIAGNOSIS — K52.9 COLITIS: ICD-10-CM

## 2024-12-05 RX ORDER — BUDESONIDE 3 MG/1
3 CAPSULE, COATED PELLETS ORAL DAILY
Qty: 30 CAPSULE | Refills: 11 | Status: SHIPPED | OUTPATIENT
Start: 2024-12-05 | End: 2024-12-06 | Stop reason: SDUPTHER

## 2024-12-06 DIAGNOSIS — K52.9 COLITIS: ICD-10-CM

## 2024-12-06 RX ORDER — BUDESONIDE 3 MG/1
3 CAPSULE, COATED PELLETS ORAL DAILY
Qty: 30 CAPSULE | Refills: 11 | Status: SHIPPED | OUTPATIENT
Start: 2024-12-06

## 2024-12-16 ENCOUNTER — TELEPHONE (OUTPATIENT)
Dept: PRIMARY CARE | Facility: CLINIC | Age: 84
End: 2024-12-16
Payer: MEDICARE

## 2024-12-17 ENCOUNTER — APPOINTMENT (OUTPATIENT)
Dept: PRIMARY CARE | Facility: CLINIC | Age: 84
End: 2024-12-17
Payer: MEDICARE

## 2025-01-06 DIAGNOSIS — I10 ESSENTIAL HYPERTENSION, BENIGN: ICD-10-CM

## 2025-01-06 DIAGNOSIS — J31.0 CHRONIC RHINITIS: ICD-10-CM

## 2025-01-06 RX ORDER — AZELASTINE 1 MG/ML
1 SPRAY, METERED NASAL 2 TIMES DAILY
Qty: 30 ML | Refills: 11 | Status: SHIPPED | OUTPATIENT
Start: 2025-01-06

## 2025-01-06 RX ORDER — AMLODIPINE BESYLATE 5 MG/1
5 TABLET ORAL DAILY
Qty: 30 TABLET | Refills: 11 | Status: SHIPPED | OUTPATIENT
Start: 2025-01-06

## 2025-01-10 ENCOUNTER — APPOINTMENT (OUTPATIENT)
Dept: PRIMARY CARE | Facility: CLINIC | Age: 85
End: 2025-01-10
Payer: MEDICARE

## 2025-01-23 ENCOUNTER — TELEPHONE (OUTPATIENT)
Dept: PRIMARY CARE | Facility: CLINIC | Age: 85
End: 2025-01-23
Payer: COMMERCIAL

## 2025-01-24 ENCOUNTER — OFFICE VISIT (OUTPATIENT)
Dept: PRIMARY CARE | Facility: CLINIC | Age: 85
End: 2025-01-24
Payer: MEDICARE

## 2025-01-24 ENCOUNTER — LAB (OUTPATIENT)
Dept: LAB | Facility: LAB | Age: 85
End: 2025-01-24
Payer: COMMERCIAL

## 2025-01-24 VITALS
DIASTOLIC BLOOD PRESSURE: 79 MMHG | RESPIRATION RATE: 21 BRPM | HEART RATE: 92 BPM | SYSTOLIC BLOOD PRESSURE: 126 MMHG | OXYGEN SATURATION: 98 % | TEMPERATURE: 96.4 F

## 2025-01-24 DIAGNOSIS — I71.43 INFRARENAL ABDOMINAL AORTIC ANEURYSM (AAA) WITHOUT RUPTURE (CMS-HCC): ICD-10-CM

## 2025-01-24 DIAGNOSIS — M15.0 PRIMARY OSTEOARTHRITIS INVOLVING MULTIPLE JOINTS: ICD-10-CM

## 2025-01-24 DIAGNOSIS — E78.5 HYPERLIPIDEMIA, UNSPECIFIED HYPERLIPIDEMIA TYPE: ICD-10-CM

## 2025-01-24 DIAGNOSIS — N18.31 STAGE 3A CHRONIC KIDNEY DISEASE (MULTI): Primary | ICD-10-CM

## 2025-01-24 DIAGNOSIS — I10 ESSENTIAL HYPERTENSION, BENIGN: ICD-10-CM

## 2025-01-24 DIAGNOSIS — N40.1 BENIGN PROSTATIC HYPERPLASIA WITH URINARY HESITANCY: ICD-10-CM

## 2025-01-24 DIAGNOSIS — R39.11 BENIGN PROSTATIC HYPERPLASIA WITH URINARY HESITANCY: ICD-10-CM

## 2025-01-24 DIAGNOSIS — F41.8 MIXED ANXIETY AND DEPRESSIVE DISORDER: ICD-10-CM

## 2025-01-24 DIAGNOSIS — N18.31 STAGE 3A CHRONIC KIDNEY DISEASE (MULTI): ICD-10-CM

## 2025-01-24 DIAGNOSIS — I87.2 CHRONIC VENOUS INSUFFICIENCY OF LOWER EXTREMITY: ICD-10-CM

## 2025-01-24 DIAGNOSIS — M06.9 RHEUMATOID ARTHRITIS INVOLVING MULTIPLE SITES, UNSPECIFIED WHETHER RHEUMATOID FACTOR PRESENT (MULTI): ICD-10-CM

## 2025-01-24 DIAGNOSIS — F43.21 GRIEF: ICD-10-CM

## 2025-01-24 LAB
ALBUMIN SERPL BCP-MCNC: 4.1 G/DL (ref 3.4–5)
ANION GAP SERPL CALCULATED.3IONS-SCNC: 13 MMOL/L (ref 10–20)
BUN SERPL-MCNC: 16 MG/DL (ref 6–23)
CALCIUM SERPL-MCNC: 8.9 MG/DL (ref 8.6–10.3)
CHLORIDE SERPL-SCNC: 105 MMOL/L (ref 98–107)
CO2 SERPL-SCNC: 25 MMOL/L (ref 21–32)
CREAT SERPL-MCNC: 1.09 MG/DL (ref 0.5–1.3)
EGFRCR SERPLBLD CKD-EPI 2021: 67 ML/MIN/1.73M*2
GLUCOSE SERPL-MCNC: 104 MG/DL (ref 74–99)
PHOSPHATE SERPL-MCNC: 3.5 MG/DL (ref 2.5–4.9)
POTASSIUM SERPL-SCNC: 3.9 MMOL/L (ref 3.5–5.3)
SODIUM SERPL-SCNC: 139 MMOL/L (ref 136–145)

## 2025-01-24 PROCEDURE — 99349 HOME/RES VST EST MOD MDM 40: CPT

## 2025-01-24 PROCEDURE — 80069 RENAL FUNCTION PANEL: CPT

## 2025-01-24 PROCEDURE — 3074F SYST BP LT 130 MM HG: CPT

## 2025-01-24 PROCEDURE — 36415 COLL VENOUS BLD VENIPUNCTURE: CPT

## 2025-01-24 PROCEDURE — 1160F RVW MEDS BY RX/DR IN RCRD: CPT

## 2025-01-24 PROCEDURE — 1159F MED LIST DOCD IN RCRD: CPT

## 2025-01-24 PROCEDURE — 3078F DIAST BP <80 MM HG: CPT

## 2025-01-24 ASSESSMENT — ENCOUNTER SYMPTOMS
UNEXPECTED WEIGHT CHANGE: 0
CHILLS: 0
SORE THROAT: 0
RHINORRHEA: 1
FATIGUE: 0
DIAPHORESIS: 0
APPETITE CHANGE: 0
SINUS PRESSURE: 1
ACTIVITY CHANGE: 0
FEVER: 0

## 2025-01-24 NOTE — PROGRESS NOTES
Subjective   Patient ID: Demarco Gudino is a 84 y.o. male who presents for Follow-up (Chronic medical conditions).    HPI   Patient seen on the couch in his private home. He is alert, oriented to person, place, time and situation. He is able to answer all questions regarding his health.      PMH: HTN, HLD, AAA, PVD, rheumatoid arthritis, neuropathy, depression/anxiety and dementia.      Demarco is standing in the kitchen upon my arrival, ambulates independently to the couch where he sits with his feet dependent. He continues to reside in a single family two story home by himself. He has a limited support system as he wife resides in a LTC facility and his only other relative lives 3 hours away. He does not drive and has his medications delivered. He is also active with [employer] and a . Demarco ambulates independently and denies any recent falls. Medications reviewed with patient. He reports that he has not needed to take 60 mg of lasix every day because he has been elevating his BLE more often.      BLE edema/PVD - Reports BLE edema has improved, he has been elevating his BLE more often during the day.  He is not able to apply compression stockings or wrap his legs due to stiffness and loss of dexterity in both of his hands.      Depression/ Dementia/ Suicide attempt Hx - Stable. Patient denies any current SI. He has experienced the loss of his two children, a son who passed from a ruptured AAA, and a daughter who passed away from MS. He cared for his daughter in his home when she declined.  He reports that he is fatigue is about the same. Follows with Nithya Fitch CNP with [facility].     AAA - Surgical repair was originally recommended in 09/2022. Patient has not yet followed with a vascular surgeon and has no intentions to do so at this time.     Arthritis of multiple joints - Patient admits to chronic pain/ inflammation in his bilateral hands and decreased mobility in his left shoulder  and bilateral knees, the pain is at baseline for him.       Home Visit: Medically necessary due to unsteady gait/poor balance, and Illness or condition that results in activity limitation or restriction that impacts the ability to leave home such as: equipment and /or human assistance needed to safely leave the home, patient has limited support systems to help the patient attend office visits, the office visit would require excessive physical effort or pain for the patient.       Current Outpatient Medications:     Allergy Relief, loratadine, 10 mg tablet, Take 1 tablet (10 mg) by mouth once daily., Disp: 30 tablet, Rfl: 11    amLODIPine (Norvasc) 5 mg tablet, Take 1 tablet (5 mg) by mouth once daily., Disp: 30 tablet, Rfl: 11    artificial tears, dextran-hypomel-glycerin, 0.1-0.3-0.2 % ophthalmic solution, Administer 1 drop into affected eye(s) 4 times a day as needed for dry eyes., Disp: , Rfl:     azelastine (Astelin) 137 mcg (0.1 %) nasal spray, Administer 1 spray into each nostril 2 times a day., Disp: 30 mL, Rfl: 11    budesonide EC (Entocort EC) 3 mg 24 hr capsule, Take 1 capsule (3 mg) by mouth once daily., Disp: 30 capsule, Rfl: 11    cholecalciferol (Vitamin D-3) 125 MCG (5000 UT) capsule, Take 1 capsule (125 mcg) by mouth once daily., Disp: 30 capsule, Rfl: 11    cyanocobalamin (Vitamin B-12) 500 mcg tablet, Take 1 tablet (500 mcg) by mouth once daily., Disp: 30 tablet, Rfl: 11    furosemide (Lasix) 20 mg tablet, Take 3 tablets (60 mg) by mouth once daily. In the morning., Disp: 90 tablet, Rfl: 11    gabapentin (Neurontin) 300 mg capsule, Take 1 capsule (300 mg) by mouth 3 times a day., Disp: 90 capsule, Rfl: 11    lisinopril 20 mg tablet, Take 1 tablet (20 mg) by mouth once daily., Disp: , Rfl:     melatonin 3 mg tablet, Take 1 tablet (3 mg) by mouth once daily at bedtime., Disp: 30 tablet, Rfl: 11    meloxicam (Mobic) 7.5 mg tablet, Take 1 tablet (7.5 mg) by mouth once daily., Disp: 30 tablet, Rfl: 11     memantine (Namenda) 28 mg capsule,sprinkle,ER 24hr, Take 1 capsule (28 mg) by mouth once daily., Disp: 30 capsule, Rfl: 11    polyethylene glycol (Glycolax, Miralax) 17 gram packet, Take 17 g by mouth once daily. Do not start before February 23, 2024., Disp: , Rfl:     polyvinyl alcohol (Liquifilm Tears) 1.4 % ophthalmic solution, Administer 2 drops into both eyes 3 times a day as needed., Disp: , Rfl:     potassium chloride CR (Klor-Con M20) 20 mEq ER tablet, Take 1 tablet (20 mEq) by mouth once daily. Do not crush, chew, or split., Disp: 30 tablet, Rfl: 11    simvastatin (Zocor) 20 mg tablet, Take 1 tablet (20 mg) by mouth once daily., Disp: 30 tablet, Rfl: 11    tamsulosin (Flomax) 0.4 mg 24 hr capsule, Take 1 capsule (0.4 mg) by mouth once daily., Disp: 30 capsule, Rfl: 11    traZODone (Desyrel) 100 mg tablet, Take 1 tablet (100 mg) by mouth once daily at bedtime., Disp: 30 tablet, Rfl: 11    venlafaxine (Effexor) 50 mg tablet, Take 1 tablet (50 mg) by mouth once daily in the morning. Take before meals., Disp: 30 tablet, Rfl: 11     Review of Systems   Constitutional:  Negative for activity change, appetite change, chills, diaphoresis, fatigue, fever and unexpected weight change.   HENT:  Positive for congestion, postnasal drip, rhinorrhea and sinus pressure. Negative for dental problem, sneezing, sore throat and trouble swallowing.         Chronic congestion and sinus issues.   Eyes:  Negative for visual disturbance.   Respiratory:  Positive for shortness of breath. Negative for cough, choking, chest tightness and wheezing.         Mild shortness of breath with exertion.   Cardiovascular:  Positive for leg swelling. Negative for chest pain and palpitations.        BLE edema improved, reports he has been elevating his legs more.   Gastrointestinal:  Negative for abdominal distention, abdominal pain, blood in stool, constipation, diarrhea, nausea and vomiting.   Genitourinary:  Negative for difficulty urinating,  dysuria, flank pain, frequency, hematuria and urgency.   Musculoskeletal:  Positive for arthralgias, back pain, joint swelling and myalgias.   Skin:  Negative for rash and wound.   Neurological:  Negative for dizziness, tremors, seizures, syncope, speech difficulty, weakness, light-headedness and headaches.   Hematological:  Bruises/bleeds easily.   Psychiatric/Behavioral:  Positive for dysphoric mood. Negative for sleep disturbance. The patient is not nervous/anxious.         Reports his mood is stable.       Objective   /79   Pulse 92   Temp 35.8 °C (96.4 °F)   Resp 21   SpO2 98% Comment: room air  Lab Results   Component Value Date    HGBA1C 5.7 01/27/2022      Lab Results   Component Value Date    WBC 6.0 10/16/2024    HGB 13.1 (L) 10/16/2024    HCT 38.4 (L) 10/16/2024     (H) 10/16/2024     10/16/2024      Lab Results   Component Value Date    GLUCOSE 104 (H) 01/24/2025    CALCIUM 8.9 01/24/2025     01/24/2025    K 3.9 01/24/2025    CO2 25 01/24/2025     01/24/2025    BUN 16 01/24/2025    CREATININE 1.09 01/24/2025      Lab Results   Component Value Date    INR 1.1 07/27/2023    INR 1.1 06/04/2019    PROTIME 11.2 07/27/2023    PROTIME 11.4 06/04/2019          Physical Exam  Constitutional:       General: He is not in acute distress.     Appearance: He is normal weight. He is not toxic-appearing.      Comments: Alert, well-groomed, well-nourished.    HENT:      Nose: Congestion present. No rhinorrhea.      Mouth/Throat:      Mouth: Mucous membranes are moist.      Pharynx: Oropharynx is clear.   Eyes:      General: No scleral icterus.     Extraocular Movements: Extraocular movements intact.      Conjunctiva/sclera: Conjunctivae normal.      Pupils: Pupils are equal, round, and reactive to light.   Cardiovascular:      Rate and Rhythm: Normal rate and regular rhythm.      Heart sounds: No murmur heard.     Comments: RLE 1+ pitting edema   LLE trace nonpitting edema  Pulmonary:  "     Effort: Pulmonary effort is normal. No respiratory distress.      Breath sounds: No wheezing or rales.   Abdominal:      General: Bowel sounds are normal. There is no distension.      Palpations: Abdomen is soft.      Tenderness: There is no abdominal tenderness. There is no guarding.   Genitourinary:     Comments: Did not observe.   Musculoskeletal:      Cervical back: Neck supple.      Comments: Noted joint deformities in bilateral hands/fingers.   Skin:     General: Skin is warm and dry.      Capillary Refill: Capillary refill takes less than 2 seconds.      Coloration: Skin is not jaundiced or pale.      Findings: No bruising, erythema, lesion or rash.   Neurological:      General: No focal deficit present.      Mental Status: He is alert and oriented to person, place, and time.   Psychiatric:         Mood and Affect: Mood normal.         Behavior: Behavior normal.         Thought Content: Thought content normal.         Judgment: Judgment normal.      Comments: Smiling, engaged in visit.          Assessment/Plan   Cardiac and Vasculature  HTN/HLD/Venous Insufficiency/BLE edema - chronic, BP stable this visit. BLE significantly decreased. He is unable to independently apply compression stockings or wrap his legs d/t RA/OA in bilateral hands.  Lab Results   Component Value Date    CHOL 163 10/16/2024    CHOL 145 07/10/2024    CHOL 121 (L) 07/27/2023     Lab Results   Component Value Date    HDL 64.6 10/16/2024    HDL 63.0 07/10/2024    HDL 62 07/27/2023     Lab Results   Component Value Date    LDLCALC 86 10/16/2024    LDLCALC 70 07/10/2024    LDLCALC 50 (L) 07/27/2023     Lab Results   Component Value Date    TRIG 60 10/16/2024    TRIG 60 07/10/2024    TRIG 45 07/27/2023     No components found for: \"CHOLHDL\"  -continue amlodipine 5 mg, furosemide 60 mg, lisinopril 20 mg, potassium chloride 20 mEq, and simvastatin 20 mg  -continue to encourage heart healthy diet and abstaining from alcohol  -continue to " encourage elevation of BLE  -routinely monitor renal function and lipids    Genitourinary and Reproductive  BPH/CKD3a - chronic, stable. GFR 57 mL/min/1.73m*2. PSA 0.64 ng/mL  -continue tamsulosin 0.4 mg  -renally dose medications  -avoid nephrotoxic agents  -routinely monitor renal function, pending    Mental Health  Grief/Depression - Chronic, stable. Both of his children are , a son at age 55 years to a ruptured AAA and a daughter to MS. His wife currently resides in a SNF. No SI. Follows with [facility]. Is not ready to seek additional help in the home or leave home for [facility]. Patient admits to drinking alcohol daily, continues with occasional marijuana use.   -continue support services  -continue memantine 28mg, venlafaxine 50 mg, trazodone 100 mg        Rheumatoid arthritis involving multiple sites (Multi)  Chronic, stable.   -continue meloxicam 7.5mg, gabapentin 300mg TID  -patient declined further PT/OT services at this time    Musculoskeletal and Injuries  RA/OA/neuropathy - Chronic, stable.  -continue meloxicam 7.5mg, gabapentin 300mg TID  -patient declined further PT/OT services at this time    Patient is improved, BLE significantly less. Labs obtained this visit, patient tolerated with minimal discomfort.            Marie Verde, SERGIO-CNP

## 2025-01-27 ASSESSMENT — ENCOUNTER SYMPTOMS
WOUND: 0
DIFFICULTY URINATING: 0
SPEECH DIFFICULTY: 0
CONSTIPATION: 0
PALPITATIONS: 0
SHORTNESS OF BREATH: 1
BACK PAIN: 1
CHEST TIGHTNESS: 0
DIZZINESS: 0
ARTHRALGIAS: 1
TREMORS: 0
SLEEP DISTURBANCE: 0
WHEEZING: 0
DYSPHORIC MOOD: 1
TROUBLE SWALLOWING: 0
CHOKING: 0
HEMATURIA: 0
BRUISES/BLEEDS EASILY: 1
VOMITING: 0
WEAKNESS: 0
MYALGIAS: 1
SEIZURES: 0
FLANK PAIN: 0
LIGHT-HEADEDNESS: 0
ABDOMINAL DISTENTION: 0
HEADACHES: 0
JOINT SWELLING: 1
NERVOUS/ANXIOUS: 0
COUGH: 0
DYSURIA: 0
BLOOD IN STOOL: 0
NAUSEA: 0
FREQUENCY: 0
DIARRHEA: 0
ABDOMINAL PAIN: 0

## 2025-01-28 NOTE — ASSESSMENT & PLAN NOTE
BPH/CKD3a - chronic, stable. GFR 57 mL/min/1.73m*2. PSA 0.64 ng/mL  -continue tamsulosin 0.4 mg  -renally dose medications  -avoid nephrotoxic agents  -routinely monitor renal function, pending

## 2025-01-28 NOTE — ASSESSMENT & PLAN NOTE
Grief/Depression - Chronic, stable. Both of his children are , a son at age 55 years to a ruptured AAA and a daughter to MS. His wife currently resides in a SNF. No SI. Follows with [facility]. Is not ready to seek additional help in the home or leave home for [facility]. Patient admits to drinking alcohol daily, continues with occasional marijuana use.   -continue support services  -continue memantine 28mg, venlafaxine 50 mg, trazodone 100 mg

## 2025-01-28 NOTE — ASSESSMENT & PLAN NOTE
"HTN/HLD/Venous Insufficiency/BLE edema - chronic, BP stable this visit. BLE significantly decreased. He is unable to independently apply compression stockings or wrap his legs d/t RA/OA in bilateral hands.  Lab Results   Component Value Date    CHOL 163 10/16/2024    CHOL 145 07/10/2024    CHOL 121 (L) 07/27/2023     Lab Results   Component Value Date    HDL 64.6 10/16/2024    HDL 63.0 07/10/2024    HDL 62 07/27/2023     Lab Results   Component Value Date    LDLCALC 86 10/16/2024    LDLCALC 70 07/10/2024    LDLCALC 50 (L) 07/27/2023     Lab Results   Component Value Date    TRIG 60 10/16/2024    TRIG 60 07/10/2024    TRIG 45 07/27/2023     No components found for: \"CHOLHDL\"  -continue amlodipine 5 mg, furosemide 60 mg, lisinopril 20 mg, potassium chloride 20 mEq, and simvastatin 20 mg  -continue to encourage heart healthy diet and abstaining from alcohol  -continue to encourage elevation of BLE  -routinely monitor renal function and lipids  "

## 2025-02-04 DIAGNOSIS — M15.0 PRIMARY OSTEOARTHRITIS INVOLVING MULTIPLE JOINTS: ICD-10-CM

## 2025-02-04 DIAGNOSIS — N40.1 BENIGN PROSTATIC HYPERPLASIA WITH LOWER URINARY TRACT SYMPTOMS, SYMPTOM DETAILS UNSPECIFIED: ICD-10-CM

## 2025-02-04 DIAGNOSIS — E55.9 VITAMIN D DEFICIENCY: ICD-10-CM

## 2025-02-05 RX ORDER — MELOXICAM 7.5 MG/1
7.5 TABLET ORAL DAILY
Qty: 30 TABLET | Refills: 11 | Status: SHIPPED | OUTPATIENT
Start: 2025-02-05 | End: 2026-02-05

## 2025-02-05 RX ORDER — TAMSULOSIN HYDROCHLORIDE 0.4 MG/1
0.4 CAPSULE ORAL DAILY
Qty: 30 CAPSULE | Refills: 11 | Status: SHIPPED | OUTPATIENT
Start: 2025-02-05 | End: 2026-02-05

## 2025-02-05 RX ORDER — VIT C/E/ZN/COPPR/LUTEIN/ZEAXAN 250MG-90MG
125 CAPSULE ORAL DAILY
Qty: 30 CAPSULE | Refills: 11 | Status: SHIPPED | OUTPATIENT
Start: 2025-02-05 | End: 2026-02-05

## 2025-03-10 ENCOUNTER — TELEPHONE (OUTPATIENT)
Dept: PRIMARY CARE | Facility: CLINIC | Age: 85
End: 2025-03-10
Payer: COMMERCIAL

## 2025-03-11 ENCOUNTER — OFFICE VISIT (OUTPATIENT)
Dept: PRIMARY CARE | Facility: CLINIC | Age: 85
End: 2025-03-11
Payer: MEDICARE

## 2025-03-11 VITALS
HEART RATE: 91 BPM | DIASTOLIC BLOOD PRESSURE: 90 MMHG | SYSTOLIC BLOOD PRESSURE: 158 MMHG | RESPIRATION RATE: 23 BRPM | TEMPERATURE: 99.5 F | OXYGEN SATURATION: 98 %

## 2025-03-11 DIAGNOSIS — I10 ESSENTIAL HYPERTENSION, BENIGN: ICD-10-CM

## 2025-03-11 DIAGNOSIS — E78.5 HYPERLIPIDEMIA, UNSPECIFIED HYPERLIPIDEMIA TYPE: ICD-10-CM

## 2025-03-11 DIAGNOSIS — I87.2 CHRONIC VENOUS INSUFFICIENCY OF LOWER EXTREMITY: Primary | ICD-10-CM

## 2025-03-11 DIAGNOSIS — M06.9 RHEUMATOID ARTHRITIS INVOLVING MULTIPLE SITES, UNSPECIFIED WHETHER RHEUMATOID FACTOR PRESENT (MULTI): ICD-10-CM

## 2025-03-11 DIAGNOSIS — G62.9 NEUROPATHY: ICD-10-CM

## 2025-03-11 DIAGNOSIS — N40.1 BENIGN PROSTATIC HYPERPLASIA WITH URINARY HESITANCY: ICD-10-CM

## 2025-03-11 DIAGNOSIS — R39.11 BENIGN PROSTATIC HYPERPLASIA WITH URINARY HESITANCY: ICD-10-CM

## 2025-03-11 DIAGNOSIS — F41.8 MIXED ANXIETY AND DEPRESSIVE DISORDER: ICD-10-CM

## 2025-03-11 DIAGNOSIS — M15.0 PRIMARY OSTEOARTHRITIS INVOLVING MULTIPLE JOINTS: ICD-10-CM

## 2025-03-11 DIAGNOSIS — F43.21 GRIEF: ICD-10-CM

## 2025-03-11 DIAGNOSIS — N18.31 STAGE 3A CHRONIC KIDNEY DISEASE (MULTI): ICD-10-CM

## 2025-03-11 PROCEDURE — 3080F DIAST BP >= 90 MM HG: CPT

## 2025-03-11 PROCEDURE — 1159F MED LIST DOCD IN RCRD: CPT

## 2025-03-11 PROCEDURE — 1160F RVW MEDS BY RX/DR IN RCRD: CPT

## 2025-03-11 PROCEDURE — 3077F SYST BP >= 140 MM HG: CPT

## 2025-03-11 PROCEDURE — 99349 HOME/RES VST EST MOD MDM 40: CPT

## 2025-03-11 NOTE — PROGRESS NOTES
Subjective   Patient ID: Demarco Gudino is a 84 y.o. male who presents for Follow-up (Chronic medical conditions.)    HPI   Patient seen on the couch in his private home. He is alert, oriented to person, place, time and situation. He is able to answer all questions regarding his health.      PMH: HTN, HLD, AAA, PVD, rheumatoid arthritis, neuropathy, depression/anxiety and dementia.      Demarco is standing in the kitchen upon my arrival, ambulates independently to the couch where he sits with his feet dependent. He continues to reside in a single family two story home by himself. He has a limited support system as he wife resides in a LTC facility and his only other relative lives 3 hours away. He does not drive and has his medications delivered. He is also active with [employer] and a . Demarco ambulates independently and denies any recent falls. Medications reviewed with patient.       BLE edema/PVD - Reports BLE edema is a little more than last visit, he has not been elevating his BLE as often during the day, and has not been taking lasix as consistently. He is not able to apply compression stockings or wrap his legs due to stiffness and loss of dexterity in both of his hands.      Depression/ Dementia/ Suicide attempt Hx - Stable. Patient denies any current SI. He has experienced the loss of his two children, a son who passed from a ruptured AAA, and a daughter who passed away from MS. He cared for his daughter in his home when she declined.  He reports that he is fatigue is about the same. Follows with Nithya Fitch CNP with [facility].     AAA - Surgical repair was originally recommended in 09/2022. Patient has not yet followed with a vascular surgeon and has no intentions to do so at this time.     Arthritis of multiple joints - Patient admits to chronic pain/ inflammation in his bilateral hands and decreased mobility in his left shoulder and bilateral knees, the pain is at baseline  for him.    Home Visit: Medically necessary due to unsteady gait/poor balance, and Illness or condition that results in activity limitation or restriction that impacts the ability to leave home such as: equipment and /or human assistance needed to safely leave the home, patient has limited support systems to help the patient attend office visits, the office visit would require excessive physical effort or pain for the patient.       Current Outpatient Medications:     Allergy Relief, loratadine, 10 mg tablet, Take 1 tablet (10 mg) by mouth once daily., Disp: 30 tablet, Rfl: 11    amLODIPine (Norvasc) 5 mg tablet, Take 1 tablet (5 mg) by mouth once daily., Disp: 30 tablet, Rfl: 11    artificial tears, dextran-hypomel-glycerin, 0.1-0.3-0.2 % ophthalmic solution, Administer 1 drop into affected eye(s) 4 times a day as needed for dry eyes., Disp: , Rfl:     azelastine (Astelin) 137 mcg (0.1 %) nasal spray, Administer 1 spray into each nostril 2 times a day., Disp: 30 mL, Rfl: 11    budesonide EC (Entocort EC) 3 mg 24 hr capsule, Take 1 capsule (3 mg) by mouth once daily., Disp: 30 capsule, Rfl: 11    cholecalciferol (Vitamin D-3) 125 mcg (5000 UT) capsule, Take 1 capsule (125 mcg) by mouth once daily., Disp: 30 capsule, Rfl: 11    cyanocobalamin (Vitamin B-12) 500 mcg tablet, Take 1 tablet (500 mcg) by mouth once daily., Disp: 30 tablet, Rfl: 11    furosemide (Lasix) 20 mg tablet, Take 3 tablets (60 mg) by mouth once daily. In the morning. (Patient taking differently: Take 3 tablets (60 mg) by mouth once daily. Not taking every day, only with increased BLE edema as needed.), Disp: 90 tablet, Rfl: 11    gabapentin (Neurontin) 300 mg capsule, Take 1 capsule (300 mg) by mouth 3 times a day., Disp: 90 capsule, Rfl: 11    lisinopril 20 mg tablet, Take 1 tablet (20 mg) by mouth once daily., Disp: , Rfl:     melatonin 3 mg tablet, Take 1 tablet (3 mg) by mouth once daily at bedtime., Disp: 30 tablet, Rfl: 11    meloxicam  (Mobic) 7.5 mg tablet, Take 1 tablet (7.5 mg) by mouth once daily., Disp: 30 tablet, Rfl: 11    memantine (Namenda) 28 mg capsule,sprinkle,ER 24hr, Take 1 capsule (28 mg) by mouth once daily., Disp: 30 capsule, Rfl: 11    polyethylene glycol (Glycolax, Miralax) 17 gram packet, Take 17 g by mouth once daily. Do not start before February 23, 2024., Disp: , Rfl:     polyvinyl alcohol (Liquifilm Tears) 1.4 % ophthalmic solution, Administer 2 drops into both eyes 3 times a day as needed., Disp: , Rfl:     potassium chloride CR (Klor-Con M20) 20 mEq ER tablet, Take 1 tablet (20 mEq) by mouth once daily. Do not crush, chew, or split., Disp: 30 tablet, Rfl: 11    simvastatin (Zocor) 20 mg tablet, Take 1 tablet (20 mg) by mouth once daily., Disp: 30 tablet, Rfl: 11    tamsulosin (Flomax) 0.4 mg 24 hr capsule, Take 1 capsule (0.4 mg) by mouth once daily., Disp: 30 capsule, Rfl: 11    traZODone (Desyrel) 100 mg tablet, Take 1 tablet (100 mg) by mouth once daily at bedtime., Disp: 30 tablet, Rfl: 11    venlafaxine (Effexor) 50 mg tablet, Take 1 tablet (50 mg) by mouth once daily in the morning. Take before meals., Disp: 30 tablet, Rfl: 11     Review of Systems  Constitutional:  Negative for activity change, appetite change, chills, diaphoresis, fatigue, fever and unexpected weight change.   HENT:  Positive for congestion, postnasal drip, rhinorrhea and sinus pressure. Negative for dental problem, sneezing, sore throat and trouble swallowing.         Chronic congestion and sinus issues.   Eyes:  Negative for visual disturbance.   Respiratory:  Positive for shortness of breath. Negative for cough, choking, chest tightness and wheezing.         Mild shortness of breath with exertion.   Cardiovascular:  Positive for leg swelling. Negative for chest pain and palpitations.        BLE edema is increased from last visit, reports he has not been elevating his legs as much, not taking lasix consistently.   Gastrointestinal:  Negative for  abdominal distention, abdominal pain, blood in stool, constipation, diarrhea, nausea and vomiting.   Genitourinary:  Negative for difficulty urinating, dysuria, flank pain, frequency, hematuria and urgency.   Musculoskeletal:  Positive for arthralgias, back pain, joint swelling and myalgias.   Skin:  Negative for rash and wound.   Neurological:  Negative for dizziness, tremors, seizures, syncope, speech difficulty, weakness, light-headedness and headaches.   Hematological:  Bruises/bleeds easily.   Psychiatric/Behavioral:  Positive for dysphoric mood. Negative for sleep disturbance. The patient is not nervous/anxious.         Reports his mood is stable.         Objective   /90   Pulse 91   Temp 37.5 °C (99.5 °F)   Resp 23   SpO2 98% Comment: room air  Lab Results   Component Value Date    HGBA1C 5.7 01/27/2022      Lab Results   Component Value Date    WBC 6.0 10/16/2024    HGB 13.1 (L) 10/16/2024    HCT 38.4 (L) 10/16/2024     (H) 10/16/2024     10/16/2024      Lab Results   Component Value Date    GLUCOSE 104 (H) 01/24/2025    CALCIUM 8.9 01/24/2025     01/24/2025    K 3.9 01/24/2025    CO2 25 01/24/2025     01/24/2025    BUN 16 01/24/2025    CREATININE 1.09 01/24/2025      Lab Results   Component Value Date    INR 1.1 07/27/2023    INR 1.1 06/04/2019    PROTIME 11.2 07/27/2023    PROTIME 11.4 06/04/2019          Physical Exam  Constitutional:       General: He is not in acute distress.     Appearance: He is normal weight. He is not toxic-appearing.      Comments: Alert, well-groomed, well-nourished.    HENT:      Nose: Congestion present. No rhinorrhea.      Mouth/Throat:      Mouth: Mucous membranes are moist.      Pharynx: Oropharynx is clear.   Eyes:      General: No scleral icterus.     Extraocular Movements: Extraocular movements intact.      Conjunctiva/sclera: Conjunctivae normal.      Pupils: Pupils are equal, round, and reactive to light.   Cardiovascular:      Rate  "and Rhythm: Normal rate and regular rhythm.      Heart sounds: No murmur heard.     Comments: RLE 2+ pitting edema   LLE 2+ pitting edema  Pulmonary:      Effort: Pulmonary effort is normal. No respiratory distress.      Breath sounds: No wheezing or rales.   Abdominal:      General: Bowel sounds are normal. There is no distension.      Palpations: Abdomen is soft.      Tenderness: There is no abdominal tenderness. There is no guarding.   Genitourinary:     Comments: Did not observe.   Musculoskeletal:      Cervical back: Neck supple.      Comments: Noted joint deformities in bilateral hands/fingers.   Skin:     General: Skin is warm and dry.      Capillary Refill: Capillary refill takes less than 2 seconds.      Coloration: Skin is not jaundiced or pale.      Findings: No bruising, erythema, lesion or rash.   Neurological:      General: No focal deficit present.      Mental Status: He is alert and oriented to person, place, and time.   Psychiatric:         Mood and Affect: Mood normal.         Behavior: Behavior normal.         Thought Content: Thought content normal.         Judgment: Judgment normal.      Comments: Smiling, engaged in visit.  Assessment/Plan   Cardiac and Vasculature  HTN/HLD/Venous Insufficiency/BLE edema - chronic, BP stable this visit. BLE increased, has not been taking lasix or elevating BLE as much. He is unable to independently apply compression stockings or wrap his legs d/t RA/OA in bilateral hands.  Lab Results   Component Value Date    CHOL 163 10/16/2024    CHOL 145 07/10/2024    CHOL 121 (L) 07/27/2023     Lab Results   Component Value Date    HDL 64.6 10/16/2024    HDL 63.0 07/10/2024    HDL 62 07/27/2023     Lab Results   Component Value Date    LDLCALC 86 10/16/2024    LDLCALC 70 07/10/2024    LDLCALC 50 (L) 07/27/2023     Lab Results   Component Value Date    TRIG 60 10/16/2024    TRIG 60 07/10/2024    TRIG 45 07/27/2023     No components found for: \"CHOLHDL\"  -continue amlodipine " 5 mg, furosemide 60 mg, lisinopril 20 mg, potassium chloride 20 mEq, and simvastatin 20 mg  -continue to encourage heart healthy diet and abstaining from alcohol  -continue to encourage elevation of BLE  -routinely monitor renal function and lipids    Genitourinary and Reproductive  BPH/CKD3a - chronic, renal function improving. GFR 67 mL/min/1.73m*2. PSA 0.64 ng/mL  -continue tamsulosin 0.4 mg  -renally dose medications  -avoid nephrotoxic agents  -routinely monitor renal function    Mental Health  Grief/Depression - Chronic, stable. Both of his children are , a son at age 55 years to a ruptured AAA and a daughter to MS. His wife currently resides in a SNF. No SI. Follows with [facility]. Is not ready to seek additional help in the home or leave home for ERROL. Daily alcohol use. Follows with Nithya Fitch CNP with [facility].  -continue support services  -continue memantine 28mg, venlafaxine 50 mg, trazodone 100 mg        Musculoskeletal and Injuries  RA/OA/neuropathy - Chronic, stable.  -continue meloxicam 7.5mg, gabapentin 300mg TID  -patient declined further PT/OT services at this time    Patient is stable. BLE edema has increased from last visit, advised patient to elevate BLE intermittently during the day, and to resume the prescribed dose of 60 mg furosemide daily. He is also to avoid processed foods (canned goods, lunch meat, hot dogs, salty snacks). Will continue with [facility] visits each month and as needed. Next visit scheduled on , plan to collect routine labs.              SERGIO West-CNP

## 2025-03-19 NOTE — ASSESSMENT & PLAN NOTE
Grief/Depression - Chronic, stable. Both of his children are , a son at age 55 years to a ruptured AAA and a daughter to MS. His wife currently resides in a SNF. No SI. Follows with [facility]. Is not ready to seek additional help in the home or leave home for ERROL. Daily alcohol use. Follows with Nithya Fitch CNP with [facility].  -continue support services  -continue memantine 28mg, venlafaxine 50 mg, trazodone 100 mg

## 2025-03-19 NOTE — ASSESSMENT & PLAN NOTE
"HTN/HLD/Venous Insufficiency/BLE edema - chronic, BP stable this visit. BLE increased, has not been taking lasix or elevating BLE as much. He is unable to independently apply compression stockings or wrap his legs d/t RA/OA in bilateral hands.  Lab Results   Component Value Date    CHOL 163 10/16/2024    CHOL 145 07/10/2024    CHOL 121 (L) 07/27/2023     Lab Results   Component Value Date    HDL 64.6 10/16/2024    HDL 63.0 07/10/2024    HDL 62 07/27/2023     Lab Results   Component Value Date    LDLCALC 86 10/16/2024    LDLCALC 70 07/10/2024    LDLCALC 50 (L) 07/27/2023     Lab Results   Component Value Date    TRIG 60 10/16/2024    TRIG 60 07/10/2024    TRIG 45 07/27/2023     No components found for: \"CHOLHDL\"  -continue amlodipine 5 mg, furosemide 60 mg, lisinopril 20 mg, potassium chloride 20 mEq, and simvastatin 20 mg  -continue to encourage heart healthy diet and abstaining from alcohol  -continue to encourage elevation of BLE  -routinely monitor renal function and lipids  "

## 2025-03-19 NOTE — ASSESSMENT & PLAN NOTE
BPH/CKD3a - chronic, renal function improving. GFR 67 mL/min/1.73m*2. PSA 0.64 ng/mL  -continue tamsulosin 0.4 mg  -renally dose medications  -avoid nephrotoxic agents  -routinely monitor renal function

## 2025-03-26 DIAGNOSIS — E78.5 HYPERLIPIDEMIA, UNSPECIFIED HYPERLIPIDEMIA TYPE: ICD-10-CM

## 2025-03-26 RX ORDER — SIMVASTATIN 20 MG/1
20 TABLET, FILM COATED ORAL DAILY
Qty: 30 TABLET | Refills: 11 | Status: SHIPPED | OUTPATIENT
Start: 2025-03-26 | End: 2026-03-26

## 2025-04-15 ENCOUNTER — TELEPHONE (OUTPATIENT)
Dept: PRIMARY CARE | Facility: CLINIC | Age: 85
End: 2025-04-15
Payer: MEDICARE

## 2025-04-16 ENCOUNTER — OFFICE VISIT (OUTPATIENT)
Dept: PRIMARY CARE | Facility: CLINIC | Age: 85
End: 2025-04-16
Payer: MEDICARE

## 2025-04-16 VITALS
TEMPERATURE: 98.4 F | DIASTOLIC BLOOD PRESSURE: 81 MMHG | HEART RATE: 78 BPM | OXYGEN SATURATION: 96 % | SYSTOLIC BLOOD PRESSURE: 130 MMHG

## 2025-04-16 DIAGNOSIS — F43.21 GRIEF: ICD-10-CM

## 2025-04-16 DIAGNOSIS — E78.2 MIXED HYPERLIPIDEMIA: ICD-10-CM

## 2025-04-16 DIAGNOSIS — R60.0 EDEMA OF RIGHT LOWER EXTREMITY: ICD-10-CM

## 2025-04-16 DIAGNOSIS — E55.9 VITAMIN D DEFICIENCY: ICD-10-CM

## 2025-04-16 DIAGNOSIS — I87.2 CHRONIC VENOUS INSUFFICIENCY OF LOWER EXTREMITY: ICD-10-CM

## 2025-04-16 DIAGNOSIS — F10.27 MILD DEMENTIA ASSOCIATED WITH ALCOHOLISM, WITHOUT BEHAVIORAL DISTURBANCE, PSYCHOTIC DISTURBANCE, MOOD DISTURBANCE, OR ANXIETY: ICD-10-CM

## 2025-04-16 DIAGNOSIS — R39.11 BENIGN PROSTATIC HYPERPLASIA WITH URINARY HESITANCY: ICD-10-CM

## 2025-04-16 DIAGNOSIS — N40.1 BENIGN PROSTATIC HYPERPLASIA WITH URINARY HESITANCY: ICD-10-CM

## 2025-04-16 DIAGNOSIS — R53.1 GENERALIZED WEAKNESS: ICD-10-CM

## 2025-04-16 DIAGNOSIS — F10.20 ALCOHOLISM (MULTI): ICD-10-CM

## 2025-04-16 DIAGNOSIS — D64.9 ANEMIA, UNSPECIFIED TYPE: ICD-10-CM

## 2025-04-16 DIAGNOSIS — I10 ESSENTIAL HYPERTENSION, BENIGN: Primary | ICD-10-CM

## 2025-04-16 DIAGNOSIS — E53.8 VITAMIN B12 DEFICIENCY: ICD-10-CM

## 2025-04-16 DIAGNOSIS — I71.43 INFRARENAL ABDOMINAL AORTIC ANEURYSM (AAA) WITHOUT RUPTURE: ICD-10-CM

## 2025-04-16 DIAGNOSIS — N18.31 STAGE 3A CHRONIC KIDNEY DISEASE (MULTI): ICD-10-CM

## 2025-04-16 DIAGNOSIS — M06.9 RHEUMATOID ARTHRITIS INVOLVING MULTIPLE SITES, UNSPECIFIED WHETHER RHEUMATOID FACTOR PRESENT (MULTI): ICD-10-CM

## 2025-04-16 DIAGNOSIS — G62.9 NEUROPATHY: ICD-10-CM

## 2025-04-16 DIAGNOSIS — F41.8 MIXED ANXIETY AND DEPRESSIVE DISORDER: ICD-10-CM

## 2025-04-16 DIAGNOSIS — M15.0 PRIMARY OSTEOARTHRITIS INVOLVING MULTIPLE JOINTS: ICD-10-CM

## 2025-04-16 PROCEDURE — 36415 COLL VENOUS BLD VENIPUNCTURE: CPT

## 2025-04-16 PROCEDURE — 3079F DIAST BP 80-89 MM HG: CPT

## 2025-04-16 PROCEDURE — 1159F MED LIST DOCD IN RCRD: CPT

## 2025-04-16 PROCEDURE — 99349 HOME/RES VST EST MOD MDM 40: CPT

## 2025-04-16 PROCEDURE — 3075F SYST BP GE 130 - 139MM HG: CPT

## 2025-04-16 PROCEDURE — 1160F RVW MEDS BY RX/DR IN RCRD: CPT

## 2025-04-16 NOTE — PROGRESS NOTES
"Subjective   Patient ID: Demarco Gudino is a 84 y.o. male who presents for Follow-up (Multiple chronic medical conditions).    HPI   Patient seen on the couch in his private home. He is alert, oriented to person, place, time and situation. He is able to answer all questions regarding his health.      PMH: HTN, HLD, AAA, PVD, rheumatoid arthritis, neuropathy, depression/anxiety and dementia.      Demarco comes to the side door to let me in, ambulates independently up the stairs and to the couch where he sits with his feet dependent. He has a steady, but slow gait. The stairs are difficult for him to manage. He continues to reside in a single family two story home by himself. He has a limited support system as he wife resides in a LTC facility and his only other relative lives 3 hours away. He does not drive and has his medications delivered. He is also active with [employer] and a . Demarco ambulates independently and denies any recent falls. Medications reviewed with patient.       BLE edema/PVD - Reports edema only in his RLE today, \"my left leg is doing great!\". He admits that his right leg will often dangle from his recliner, especially when he is sleeping. He admits he has not been taking lasix as consistently, \"but I am going to take it today\". He is not able to apply compression stockings or wrap his legs due to stiffness and loss of dexterity in both of his hands. He denies any open areas or blisters, no warmth or redness. He does report pain, but does not feel it is worse than normal. He continues to report neuropathic pain in his BLE.      Depression/ Dementia/ Suicide attempt Hx - Stable. He has experienced the loss of his two children, a son who passed from a ruptured AAA, and a daughter who passed away from MS. He cared for his daughter in his home when she declined.  States \"I'm still here\", when asked about his general well-being. He reports that he is fatigue is about the same. " There are several boxes of alcohol around the home, and he admits to drinking daily, a beer or two. He has not seen his nephew in person for many weeks, but has talked to him on the phone. Patient denies any current SI. Follows with Nithya Fitch CNP with [facility].     AAA - Surgical repair was originally recommended in 09/2022. Patient has not yet followed with a vascular surgeon and has no intentions to do so at this time. He denies chest, abdominal, back, or flank pain. No nausea/vomiting. No discoloration on trunk and groin/scrotum areas, or abdominal pulsations are reported. He does not monitor his BP, but is compliant with his medications.     Arthritis of multiple joints - Patient admits to chronic pain/ inflammation in his bilateral hands and decreased mobility in his left shoulder and bilateral knees, the pain is at baseline for him.    Home Visit: Medically necessary due to dementia/cognitive impairment, unsteady gait/poor balance, and Illness or condition that results in activity limitation or restriction that impacts the ability to leave home such as: equipment and /or human assistance needed to safely leave the home, patient has limited support systems to help the patient attend office visits, the office visit would require excessive physical effort or pain for the patient.     Current Medications[1]     Review of Systems  Constitutional:  Negative for activity change, appetite change, chills, diaphoresis, fatigue, fever and unexpected weight change.   HENT:  Positive for congestion, postnasal drip, rhinorrhea and sinus pressure. Negative for dental problem, sneezing, sore throat and trouble swallowing.         Chronic congestion and sinus issues.   Eyes:  Negative for visual disturbance.   Respiratory:  Positive for shortness of breath. Negative for cough, choking, chest tightness and wheezing.         Mild shortness of breath with exertion.   Cardiovascular:  Positive for leg swelling. Negative  for chest pain and palpitations.        Edema is limited to the RLE this visit. Admits that this leg does dangle off his recliner at times, and he is not taking lasix consistently.   Gastrointestinal:  Negative for abdominal distention, abdominal pain, blood in stool, constipation, diarrhea, nausea and vomiting.   Genitourinary:  Negative for difficulty urinating, dysuria, flank pain, frequency, hematuria and urgency.   Musculoskeletal:  Positive for arthralgias, back pain, joint swelling and myalgias.   Skin:  Negative for rash and wound.   Neurological:  Negative for dizziness, tremors, seizures, syncope, speech difficulty, weakness, light-headedness and headaches.   Hematological:  Bruises/bleeds easily.   Psychiatric/Behavioral:  Positive for dysphoric mood. Negative for sleep disturbance. The patient is not nervous/anxious.         Reports his mood is stable.   Objective   /81   Pulse 78   Temp 36.9 °C (98.4 °F)   SpO2 96% Comment: room air  Lab Results   Component Value Date    HGBA1C 5.7 01/27/2022      Lab Results   Component Value Date    WBC 6.0 10/16/2024    HGB 13.1 (L) 10/16/2024    HCT 38.4 (L) 10/16/2024     (H) 10/16/2024     10/16/2024      Lab Results   Component Value Date    GLUCOSE 104 (H) 01/24/2025    CALCIUM 8.9 01/24/2025     01/24/2025    K 3.9 01/24/2025    CO2 25 01/24/2025     01/24/2025    BUN 16 01/24/2025    CREATININE 1.09 01/24/2025      Lab Results   Component Value Date    INR 1.1 07/27/2023    INR 1.1 06/04/2019    PROTIME 11.2 07/27/2023    PROTIME 11.4 06/04/2019      Physical Exam  Constitutional:       General: He is not in acute distress.     Appearance: He is obese. He is not toxic-appearing.      Comments: Alert, well-groomed, well-nourished.    HENT:      Head: Normocephalic.      Nose: Congestion present. No rhinorrhea.      Mouth/Throat:      Pharynx: Oropharynx is clear. No oropharyngeal exudate or posterior oropharyngeal erythema.    Eyes:      General: No scleral icterus.     Extraocular Movements: Extraocular movements intact.      Pupils: Pupils are equal, round, and reactive to light.      Comments: Injected conjunctivae     Neck:      Vascular: No carotid bruit.   Cardiovascular:      Rate and Rhythm: Normal rate and regular rhythm.      Pulses: Normal pulses.      Heart sounds: No murmur heard.     Comments: No JVD  RLE 3+ pitting edema  LLE no edema   Pulmonary:      Effort: Pulmonary effort is normal. No respiratory distress.      Breath sounds: No stridor. No wheezing, rhonchi or rales.   Chest:      Chest wall: No tenderness.   Abdominal:      General: Bowel sounds are normal. There is no distension.      Palpations: Abdomen is soft. There is no mass.      Tenderness: There is no abdominal tenderness. There is no guarding.      Hernia: No hernia is present.      Comments: No abdominal mass palpated, no pulsations.   Genitourinary:     Comments: Did not assess area.  Musculoskeletal:         General: No deformity or signs of injury.      Cervical back: Neck supple. No tenderness.      Comments: Noted joint deformities in bilateral hands/fingers.    Lymphadenopathy:      Cervical: No cervical adenopathy.   Skin:     General: Skin is warm and dry.      Capillary Refill: Capillary refill takes less than 2 seconds.      Coloration: Skin is not jaundiced or pale.      Findings: Bruising present. No erythema, lesion or rash.      Comments: BUE with scattered areas of ecchymosis   Neurological:      General: No focal deficit present.      Mental Status: He is alert and oriented to person, place, and time.      Comments: Clear and mostly coherent speech noted. MAEx4 with equal strength. Suspected drug and/or alcohol use prior to visit. Steady gait noted, and there was no odor of marijuana or alcohol, but mood was elated and he had trouble keeping his eyes open during assessment. Made some comments that did not make sense, out of the norm for  "him.    Psychiatric:         Mood and Affect: Mood normal.         Behavior: Behavior normal.         Thought Content: Thought content normal.         Judgment: Judgment normal.      Comments: Calm, cooperative. Engaged in visit, but I suspect he had been drinking alcohol or smoking marijuana prior to my visit, his mood was quite elated, and some comments he made did not always make sense, which is out of the norm for him. Also had some trouble keeping his eyes open at times, sat on the couch with eyes closed and a smile on his face.        Assessment/Plan   Cardiac and Vasculature  HTN/HLD/Venous Insufficiency/BLE edema/AAA - chronic, BP stable this visit. Edema limited to RLE (3+ pitting), has not been taking lasix consistently.  He is unable to independently apply compression stockings or wrap his legs d/t RA/OA in bilateral hands. He is not interested in pursing surgical intervention for AAA at this time.  CT abdomen in December 2023: No acute abdominopelvic pathology.  Interval increase in size an infrarenal abdominal aortic aneurysm  measuring 7.9 x 7.5 cm, previously measuring 7 x 6 cm in 2022. There  is diffuse colonic diverticulosis without evidence of diverticulitis.  Lab Results   Component Value Date    HDL 64.6 10/16/2024    HDL 63.0 07/10/2024    HDL 62 07/27/2023     Lab Results   Component Value Date    LDLCALC 86 10/16/2024    LDLCALC 70 07/10/2024    LDLCALC 50 (L) 07/27/2023     Lab Results   Component Value Date    TRIG 60 10/16/2024    TRIG 60 07/10/2024    TRIG 45 07/27/2023     No components found for: \"CHOLHDL\"  -continue amlodipine 5 mg, furosemide 60 mg, lisinopril 20 mg, potassium chloride 20 mEq, and simvastatin 20 mg  -continue to encourage heart healthy diet and abstaining from alcohol  -continue to encourage elevation of BLE  -routinely monitor renal function and lipids, pending  -RLE venous duplex ordered to r/o DVT     Genitourinary and Reproductive  BPH/CKD3a - chronic, renal " "function improving. GFR 67 mL/min/1.73m*2. No reported urinary issues, PSA 0.64 ng/mL  -continue tamsulosin 0.4 mg  -renally dose medications  -avoid nephrotoxic agents  -routinely monitor renal function, pending    Musculoskeletal and Injuries  RA/OA/neuropathy - Chronic, stable.  -continue meloxicam 7.5mg, gabapentin 300mg TID  -patient declined further PT/OT services at this time    Anemia  Chronic, unspecified, but likely d/t nutritional deficiencies. Vague symptoms of fatigue and shortness of breath with exertion.  Lab Results   Component Value Date    IRON 92 2019    TIBC 291 2019    FERRITIN 145 2019   -cbc, vitamin B12, thiamine, and folate levels are pending      Mental Health  Grief/Depression - Chronic, stable. Both of his children are , a son at age 55 years to a ruptured AAA and a daughter to MS. His wife currently resides in a SNF. No SI. Follows with [facility]. Is not ready to seek additional help in the home or leave home for longterm. Daily alcohol use. Follows with Nithya Fitch CNP with [facility].  -continue support services  -continue memantine 28mg, venlafaxine 50 mg, trazodone 100 mg   -encourage alcohol cessation    Alcoholism - admits to daily alcohol use, but \"not too much, a beer or two\". He is not ready to discuss abstinence at this time.  -folate and thiamine levels pending       Patient is stable, but concern for unilateral extremity edema (RLE). Duplex ordered, patient verbally states understanding that mobile service will not come unless he answers the phone for scheduling. Routine labs obtained, patient tolerated with minimal discomfort. I do suspect that the patient had been drinking or smoking marijuana prior to my visit. No odor of drugs or alcohol, but patient displayed an elated mood and made a few off the wall comments that were out of the norm for him. Will continue with [facility] NP visits every 2 months and as needed.              Marie ALMAZAN" Lower, APRN-CNP         [1]   Current Outpatient Medications:     Allergy Relief, loratadine, 10 mg tablet, Take 1 tablet (10 mg) by mouth once daily., Disp: 30 tablet, Rfl: 11    amLODIPine (Norvasc) 5 mg tablet, Take 1 tablet (5 mg) by mouth once daily., Disp: 30 tablet, Rfl: 11    artificial tears, dextran-hypomel-glycerin, 0.1-0.3-0.2 % ophthalmic solution, Administer 1 drop into affected eye(s) 4 times a day as needed for dry eyes., Disp: , Rfl:     azelastine (Astelin) 137 mcg (0.1 %) nasal spray, Administer 1 spray into each nostril 2 times a day., Disp: 30 mL, Rfl: 11    budesonide EC (Entocort EC) 3 mg 24 hr capsule, Take 1 capsule (3 mg) by mouth once daily., Disp: 30 capsule, Rfl: 11    cholecalciferol (Vitamin D-3) 125 mcg (5000 UT) capsule, Take 1 capsule (125 mcg) by mouth once daily., Disp: 30 capsule, Rfl: 11    cyanocobalamin (Vitamin B-12) 500 mcg tablet, Take 1 tablet (500 mcg) by mouth once daily., Disp: 30 tablet, Rfl: 11    furosemide (Lasix) 20 mg tablet, Take 3 tablets (60 mg) by mouth once daily. In the morning. (Patient taking differently: Take 3 tablets (60 mg) by mouth once daily. Not taking every day, only with increased BLE edema as needed.), Disp: 90 tablet, Rfl: 11    gabapentin (Neurontin) 300 mg capsule, Take 1 capsule (300 mg) by mouth 3 times a day., Disp: 90 capsule, Rfl: 11    lisinopril 20 mg tablet, Take 1 tablet (20 mg) by mouth once daily., Disp: , Rfl:     meloxicam (Mobic) 7.5 mg tablet, Take 1 tablet (7.5 mg) by mouth once daily., Disp: 30 tablet, Rfl: 11    memantine (Namenda) 28 mg capsule,sprinkle,ER 24hr, Take 1 capsule (28 mg) by mouth once daily., Disp: 30 capsule, Rfl: 11    polyethylene glycol (Glycolax, Miralax) 17 gram packet, Take 17 g by mouth once daily. Do not start before February 23, 2024., Disp: , Rfl:     polyvinyl alcohol (Liquifilm Tears) 1.4 % ophthalmic solution, Administer 2 drops into both eyes 3 times a day as needed., Disp: , Rfl:     potassium  chloride CR (Klor-Con M20) 20 mEq ER tablet, Take 1 tablet (20 mEq) by mouth once daily. Do not crush, chew, or split., Disp: 30 tablet, Rfl: 11    simvastatin (Zocor) 20 mg tablet, Take 1 tablet (20 mg) by mouth once daily., Disp: 30 tablet, Rfl: 11    tamsulosin (Flomax) 0.4 mg 24 hr capsule, Take 1 capsule (0.4 mg) by mouth once daily., Disp: 30 capsule, Rfl: 11    traZODone (Desyrel) 100 mg tablet, Take 1 tablet (100 mg) by mouth once daily at bedtime., Disp: 30 tablet, Rfl: 11    venlafaxine (Effexor) 50 mg tablet, Take 1 tablet (50 mg) by mouth once daily in the morning. Take before meals., Disp: 30 tablet, Rfl: 11

## 2025-04-16 NOTE — Clinical Note
I placed an order for a venous ultrasound of the right lower extremity, please send to [employer], or another appropriate [employer] company, for scheduling. Thank you in advance!

## 2025-04-17 LAB
25(OH)D3+25(OH)D2 SERPL-MCNC: 84 NG/ML (ref 30–100)
ALBUMIN SERPL-MCNC: 4 G/DL (ref 3.6–5.1)
ALP SERPL-CCNC: 71 U/L (ref 35–144)
ALT SERPL-CCNC: 7 U/L (ref 9–46)
ANION GAP SERPL CALCULATED.4IONS-SCNC: 12 MMOL/L (CALC) (ref 7–17)
AST SERPL-CCNC: 11 U/L (ref 10–35)
BASOPHILS # BLD AUTO: 41 CELLS/UL (ref 0–200)
BASOPHILS NFR BLD AUTO: 0.7 %
BILIRUB SERPL-MCNC: 0.5 MG/DL (ref 0.2–1.2)
BUN SERPL-MCNC: 19 MG/DL (ref 7–25)
CALCIUM SERPL-MCNC: 9.1 MG/DL (ref 8.6–10.3)
CHLORIDE SERPL-SCNC: 106 MMOL/L (ref 98–110)
CHOLEST SERPL-MCNC: 157 MG/DL
CHOLEST/HDLC SERPL: 2 (CALC)
CO2 SERPL-SCNC: 24 MMOL/L (ref 20–32)
CREAT SERPL-MCNC: 1.14 MG/DL (ref 0.7–1.22)
EGFRCR SERPLBLD CKD-EPI 2021: 64 ML/MIN/1.73M2
EOSINOPHIL # BLD AUTO: 81 CELLS/UL (ref 15–500)
EOSINOPHIL NFR BLD AUTO: 1.4 %
ERYTHROCYTE [DISTWIDTH] IN BLOOD BY AUTOMATED COUNT: 13 % (ref 11–15)
FOLATE SERPL-MCNC: 13.8 NG/ML
GLUCOSE SERPL-MCNC: 99 MG/DL (ref 65–99)
HCT VFR BLD AUTO: 35.3 % (ref 38.5–50)
HDLC SERPL-MCNC: 79 MG/DL
HGB BLD-MCNC: 12.4 G/DL (ref 13.2–17.1)
LDLC SERPL CALC-MCNC: 64 MG/DL (CALC)
LYMPHOCYTES # BLD AUTO: 853 CELLS/UL (ref 850–3900)
LYMPHOCYTES NFR BLD AUTO: 14.7 %
MCH RBC QN AUTO: 35.4 PG (ref 27–33)
MCHC RBC AUTO-ENTMCNC: 35.1 G/DL (ref 32–36)
MCV RBC AUTO: 100.9 FL (ref 80–100)
MONOCYTES # BLD AUTO: 435 CELLS/UL (ref 200–950)
MONOCYTES NFR BLD AUTO: 7.5 %
NEUTROPHILS # BLD AUTO: 4391 CELLS/UL (ref 1500–7800)
NEUTROPHILS NFR BLD AUTO: 75.7 %
NONHDLC SERPL-MCNC: 78 MG/DL (CALC)
PLATELET # BLD AUTO: 193 THOUSAND/UL (ref 140–400)
PMV BLD REES-ECKER: 11.1 FL (ref 7.5–12.5)
POTASSIUM SERPL-SCNC: 3.5 MMOL/L (ref 3.5–5.3)
PROT SERPL-MCNC: 6.4 G/DL (ref 6.1–8.1)
RBC # BLD AUTO: 3.5 MILLION/UL (ref 4.2–5.8)
SODIUM SERPL-SCNC: 142 MMOL/L (ref 135–146)
TRIGL SERPL-MCNC: 64 MG/DL
TSH SERPL-ACNC: 1.79 MIU/L (ref 0.4–4.5)
VIT B1 BLD-SCNC: NORMAL NMOL/L
VIT B12 SERPL-MCNC: 176 PG/ML (ref 200–1100)
WBC # BLD AUTO: 5.8 THOUSAND/UL (ref 3.8–10.8)

## 2025-04-17 NOTE — ASSESSMENT & PLAN NOTE
"Grief/Depression - Chronic, stable. Both of his children are , a son at age 55 years to a ruptured AAA and a daughter to MS. His wife currently resides in a SNF. No SI. Follows with [facility]. Is not ready to seek additional help in the home or leave home for group home. Daily alcohol use. Follows with Nithya Fitch [facility] with [facility].  -continue support services  -continue memantine 28mg, venlafaxine 50 mg, trazodone 100 mg   -encourage alcohol cessation    Alcoholism - admits to daily alcohol use, but \"not too much, a beer or two\". He is not ready to discuss abstinence at this time.  -folate and thiamine levels pending     "

## 2025-04-17 NOTE — ASSESSMENT & PLAN NOTE
Chronic, unspecified, but likely d/t nutritional deficiencies. Vague symptoms of fatigue and shortness of breath with exertion.  Lab Results   Component Value Date    IRON 92 05/13/2019    TIBC 291 05/13/2019    FERRITIN 145 05/13/2019   -cbc, vitamin B12, thiamine, and folate levels are pending

## 2025-04-17 NOTE — ASSESSMENT & PLAN NOTE
BPH/CKD3a - chronic, renal function improving. GFR 67 mL/min/1.73m*2. No reported urinary issues, PSA 0.64 ng/mL  -continue tamsulosin 0.4 mg  -renally dose medications  -avoid nephrotoxic agents  -routinely monitor renal function, pending

## 2025-04-17 NOTE — ASSESSMENT & PLAN NOTE
"HTN/HLD/Venous Insufficiency/BLE edema/AAA - chronic, BP stable this visit. Edema limited to RLE (3+ pitting), has not been taking lasix consistently.  He is unable to independently apply compression stockings or wrap his legs d/t RA/OA in bilateral hands. He is not interested in pursing surgical intervention for AAA at this time.  CT abdomen in December 2023: No acute abdominopelvic pathology.  Interval increase in size an infrarenal abdominal aortic aneurysm  measuring 7.9 x 7.5 cm, previously measuring 7 x 6 cm in 2022. There  is diffuse colonic diverticulosis without evidence of diverticulitis.  Lab Results   Component Value Date    HDL 64.6 10/16/2024    HDL 63.0 07/10/2024    HDL 62 07/27/2023     Lab Results   Component Value Date    LDLCALC 86 10/16/2024    LDLCALC 70 07/10/2024    LDLCALC 50 (L) 07/27/2023     Lab Results   Component Value Date    TRIG 60 10/16/2024    TRIG 60 07/10/2024    TRIG 45 07/27/2023     No components found for: \"CHOLHDL\"  -continue amlodipine 5 mg, furosemide 60 mg, lisinopril 20 mg, potassium chloride 20 mEq, and simvastatin 20 mg  -continue to encourage heart healthy diet and abstaining from alcohol  -continue to encourage elevation of BLE  -routinely monitor renal function and lipids, pending  -RLE venous duplex ordered to r/o DVT   "

## 2025-04-18 ENCOUNTER — TELEPHONE (OUTPATIENT)
Dept: PRIMARY CARE | Facility: CLINIC | Age: 85
End: 2025-04-18
Payer: MEDICARE

## 2025-04-18 NOTE — TELEPHONE ENCOUNTER
Received faxed document with information as follows:     RIGHT lower Extremity Venous Doppler - Unilateral:   RIGHT EXTREMITY VEINS US:   Right lower extremity venous ultrasound: Normal flow, normal compression.  No visible thrombus.  No incidental dindings.     IMPRESSION:   No evidence of DVT

## 2025-04-18 NOTE — TELEPHONE ENCOUNTER
Call placed to patient number as listed, no answer, no option to leave message, will attempt again.

## 2025-04-19 ENCOUNTER — TELEPHONE (OUTPATIENT)
Dept: PRIMARY CARE | Facility: CLINIC | Age: 85
End: 2025-04-19
Payer: MEDICARE

## 2025-04-19 DIAGNOSIS — F03.A0 MILD DEMENTIA WITHOUT BEHAVIORAL DISTURBANCE, PSYCHOTIC DISTURBANCE, MOOD DISTURBANCE, OR ANXIETY, UNSPECIFIED DEMENTIA TYPE: ICD-10-CM

## 2025-04-19 DIAGNOSIS — D51.9 ANEMIA DUE TO VITAMIN B12 DEFICIENCY, UNSPECIFIED B12 DEFICIENCY TYPE: ICD-10-CM

## 2025-04-19 DIAGNOSIS — F10.20 ALCOHOLISM (MULTI): ICD-10-CM

## 2025-04-19 DIAGNOSIS — E53.8 VITAMIN B12 DEFICIENCY: Primary | ICD-10-CM

## 2025-04-19 RX ORDER — LANOLIN ALCOHOL/MO/W.PET/CERES
100 CREAM (GRAM) TOPICAL DAILY
Qty: 30 TABLET | Refills: 11 | Status: SHIPPED | OUTPATIENT
Start: 2025-04-19 | End: 2026-04-19

## 2025-04-19 RX ORDER — VIT C/E/ZN/COPPR/LUTEIN/ZEAXAN 250MG-90MG
CAPSULE ORAL
Qty: 1 TABLET | Refills: 0 | Status: SHIPPED | OUTPATIENT
Start: 2025-04-19

## 2025-04-24 LAB
25(OH)D3+25(OH)D2 SERPL-MCNC: 84 NG/ML (ref 30–100)
ALBUMIN SERPL-MCNC: 4 G/DL (ref 3.6–5.1)
ALP SERPL-CCNC: 71 U/L (ref 35–144)
ALT SERPL-CCNC: 7 U/L (ref 9–46)
ANION GAP SERPL CALCULATED.4IONS-SCNC: 12 MMOL/L (CALC) (ref 7–17)
AST SERPL-CCNC: 11 U/L (ref 10–35)
BASOPHILS # BLD AUTO: 41 CELLS/UL (ref 0–200)
BASOPHILS NFR BLD AUTO: 0.7 %
BILIRUB SERPL-MCNC: 0.5 MG/DL (ref 0.2–1.2)
BUN SERPL-MCNC: 19 MG/DL (ref 7–25)
CALCIUM SERPL-MCNC: 9.1 MG/DL (ref 8.6–10.3)
CHLORIDE SERPL-SCNC: 106 MMOL/L (ref 98–110)
CHOLEST SERPL-MCNC: 157 MG/DL
CHOLEST/HDLC SERPL: 2 (CALC)
CO2 SERPL-SCNC: 24 MMOL/L (ref 20–32)
CREAT SERPL-MCNC: 1.14 MG/DL (ref 0.7–1.22)
EGFRCR SERPLBLD CKD-EPI 2021: 64 ML/MIN/1.73M2
EOSINOPHIL # BLD AUTO: 81 CELLS/UL (ref 15–500)
EOSINOPHIL NFR BLD AUTO: 1.4 %
ERYTHROCYTE [DISTWIDTH] IN BLOOD BY AUTOMATED COUNT: 13 % (ref 11–15)
FOLATE SERPL-MCNC: 13.8 NG/ML
GLUCOSE SERPL-MCNC: 99 MG/DL (ref 65–99)
HCT VFR BLD AUTO: 35.3 % (ref 38.5–50)
HDLC SERPL-MCNC: 79 MG/DL
HGB BLD-MCNC: 12.4 G/DL (ref 13.2–17.1)
LDLC SERPL CALC-MCNC: 64 MG/DL (CALC)
LYMPHOCYTES # BLD AUTO: 853 CELLS/UL (ref 850–3900)
LYMPHOCYTES NFR BLD AUTO: 14.7 %
MCH RBC QN AUTO: 35.4 PG (ref 27–33)
MCHC RBC AUTO-ENTMCNC: 35.1 G/DL (ref 32–36)
MCV RBC AUTO: 100.9 FL (ref 80–100)
MONOCYTES # BLD AUTO: 435 CELLS/UL (ref 200–950)
MONOCYTES NFR BLD AUTO: 7.5 %
NEUTROPHILS # BLD AUTO: 4391 CELLS/UL (ref 1500–7800)
NEUTROPHILS NFR BLD AUTO: 75.7 %
NONHDLC SERPL-MCNC: 78 MG/DL (CALC)
PLATELET # BLD AUTO: 193 THOUSAND/UL (ref 140–400)
PMV BLD REES-ECKER: 11.1 FL (ref 7.5–12.5)
POTASSIUM SERPL-SCNC: 3.5 MMOL/L (ref 3.5–5.3)
PROT SERPL-MCNC: 6.4 G/DL (ref 6.1–8.1)
RBC # BLD AUTO: 3.5 MILLION/UL (ref 4.2–5.8)
SODIUM SERPL-SCNC: 142 MMOL/L (ref 135–146)
TRIGL SERPL-MCNC: 64 MG/DL
TSH SERPL-ACNC: 1.79 MIU/L (ref 0.4–4.5)
VIT B1 BLD-SCNC: 131 NMOL/L (ref 78–185)
VIT B12 SERPL-MCNC: 176 PG/ML (ref 200–1100)
WBC # BLD AUTO: 5.8 THOUSAND/UL (ref 3.8–10.8)

## 2025-04-29 DIAGNOSIS — F10.20 ALCOHOLISM (MULTI): ICD-10-CM

## 2025-04-29 DIAGNOSIS — D51.9 ANEMIA DUE TO VITAMIN B12 DEFICIENCY, UNSPECIFIED B12 DEFICIENCY TYPE: ICD-10-CM

## 2025-04-29 DIAGNOSIS — F03.A0 MILD DEMENTIA WITHOUT BEHAVIORAL DISTURBANCE, PSYCHOTIC DISTURBANCE, MOOD DISTURBANCE, OR ANXIETY, UNSPECIFIED DEMENTIA TYPE: ICD-10-CM

## 2025-04-29 DIAGNOSIS — E53.8 VITAMIN B12 DEFICIENCY: ICD-10-CM

## 2025-04-29 RX ORDER — VIT C/E/ZN/COPPR/LUTEIN/ZEAXAN 250MG-90MG
CAPSULE ORAL
Qty: 12 TABLET | Refills: 11 | Status: SHIPPED | OUTPATIENT
Start: 2025-04-29

## 2025-05-29 DIAGNOSIS — I10 ESSENTIAL HYPERTENSION, BENIGN: ICD-10-CM

## 2025-05-29 RX ORDER — FUROSEMIDE 20 MG/1
60 TABLET ORAL DAILY
Qty: 270 TABLET | Refills: 3 | Status: SHIPPED | OUTPATIENT
Start: 2025-05-29 | End: 2026-05-29

## 2025-06-11 ENCOUNTER — TELEPHONE (OUTPATIENT)
Dept: PRIMARY CARE | Facility: CLINIC | Age: 85
End: 2025-06-11
Payer: MEDICARE

## 2025-06-11 DIAGNOSIS — R53.83 OTHER FATIGUE: ICD-10-CM

## 2025-06-11 DIAGNOSIS — R06.02 SHORTNESS OF BREATH: Primary | ICD-10-CM

## 2025-06-11 DIAGNOSIS — R05.1 ACUTE COUGH: ICD-10-CM

## 2025-06-12 NOTE — TELEPHONE ENCOUNTER
Received faxed document with information as follows:   XRAY CHEST 1 VIEW:   Results: The lungs are clear.  The heart and mediastinal structures are unremarkable.   Conclusion: No acute cardiopulmonary process.

## 2025-06-17 ENCOUNTER — TELEPHONE (OUTPATIENT)
Dept: PRIMARY CARE | Facility: CLINIC | Age: 85
End: 2025-06-17
Payer: MEDICARE

## 2025-06-18 ENCOUNTER — OFFICE VISIT (OUTPATIENT)
Dept: PRIMARY CARE | Facility: CLINIC | Age: 85
End: 2025-06-18
Payer: MEDICARE

## 2025-06-18 VITALS
TEMPERATURE: 98.3 F | HEART RATE: 86 BPM | RESPIRATION RATE: 19 BRPM | OXYGEN SATURATION: 97 % | SYSTOLIC BLOOD PRESSURE: 123 MMHG | DIASTOLIC BLOOD PRESSURE: 82 MMHG

## 2025-06-18 DIAGNOSIS — N40.1 BENIGN PROSTATIC HYPERPLASIA WITH URINARY HESITANCY: ICD-10-CM

## 2025-06-18 DIAGNOSIS — M06.9 RHEUMATOID ARTHRITIS INVOLVING MULTIPLE SITES, UNSPECIFIED WHETHER RHEUMATOID FACTOR PRESENT (MULTI): ICD-10-CM

## 2025-06-18 DIAGNOSIS — N18.31 STAGE 3A CHRONIC KIDNEY DISEASE (MULTI): Primary | ICD-10-CM

## 2025-06-18 DIAGNOSIS — F41.8 MIXED ANXIETY AND DEPRESSIVE DISORDER: ICD-10-CM

## 2025-06-18 DIAGNOSIS — R53.1 GENERALIZED WEAKNESS: ICD-10-CM

## 2025-06-18 DIAGNOSIS — R39.11 BENIGN PROSTATIC HYPERPLASIA WITH URINARY HESITANCY: ICD-10-CM

## 2025-06-18 DIAGNOSIS — F43.21 GRIEF: ICD-10-CM

## 2025-06-18 DIAGNOSIS — E78.2 MIXED HYPERLIPIDEMIA: ICD-10-CM

## 2025-06-18 DIAGNOSIS — M15.0 PRIMARY OSTEOARTHRITIS INVOLVING MULTIPLE JOINTS: ICD-10-CM

## 2025-06-18 DIAGNOSIS — I87.2 CHRONIC VENOUS INSUFFICIENCY OF LOWER EXTREMITY: ICD-10-CM

## 2025-06-18 DIAGNOSIS — I10 ESSENTIAL HYPERTENSION, BENIGN: ICD-10-CM

## 2025-06-18 DIAGNOSIS — I71.43 INFRARENAL ABDOMINAL AORTIC ANEURYSM (AAA) WITHOUT RUPTURE: ICD-10-CM

## 2025-06-18 PROCEDURE — 1160F RVW MEDS BY RX/DR IN RCRD: CPT

## 2025-06-18 PROCEDURE — 99349 HOME/RES VST EST MOD MDM 40: CPT

## 2025-06-18 PROCEDURE — 3079F DIAST BP 80-89 MM HG: CPT

## 2025-06-18 PROCEDURE — 1159F MED LIST DOCD IN RCRD: CPT

## 2025-06-18 PROCEDURE — 3074F SYST BP LT 130 MM HG: CPT

## 2025-06-18 NOTE — PROGRESS NOTES
"Subjective   Patient ID: Demarco Gudino is a 84 y.o. male who presents for Follow-up (Multiple chronic medical conditions).    HPI  Patient seen on the couch in his private home. He is alert, oriented to person, place, time and situation. He is able to answer all questions regarding his health.      PMH: HTN, HLD, AAA, PVD, rheumatoid arthritis, neuropathy, depression/anxiety and dementia.      Demarco comes to the side door to let me in, ambulates independently up the stairs and to the couch where he sits with his feet dependent. He has a steady, but slow gait. The stairs are difficult for him to manage. He continues to reside in a single family two story home by himself. He has a limited support system as he wife resides in a LTC facility and his only other relative lives 3 hours away. He does not drive and has his medications delivered. He is also active with [employer] and a . Demarco ambulates independently and denies any recent falls. Medications reviewed with patient.       BLE edema/PVD - Reports edema only in his RLE today, \"my left leg is doing great!\". He admits that his right leg will often dangle from his recliner, especially when he is sleeping. Endorses compliance with lasix and potassium. He is not able to apply compression stockings or wrap his legs due to stiffness and loss of dexterity in both of his hands. He denies any open areas or blisters, no warmth or redness. He does report pain, but does not feel it is worse than normal. He continues to report neuropathic pain in his BLE.      Depression/ Dementia/ Suicide attempt Hx - Stable. He has experienced the loss of his two children, a son who passed from a ruptured AAA, and a daughter who passed away from MS. He cared for his daughter in his home when she declined.  States \"I'm still here\", when asked about his general well-being. He reports that he is fatigue is about the same. There are several boxes of alcohol around the " home, and he admits to drinking daily, a beer or two. He has not seen his nephew in person for many weeks, but has talked to him on the phone. Patient denies any current SI. Follows with Nithya Fitch CNP with [facility].     AAA - Surgical repair was originally recommended in 09/2022. Patient has not yet followed with a vascular surgeon and has no intentions to do so at this time. He denies chest, abdominal, back, or flank pain. No nausea/vomiting. No discoloration on trunk and groin/scrotum areas, or abdominal pulsations are reported. He does not monitor his BP, but is compliant with his medications.     Arthritis of multiple joints - Patient admits to chronic pain/ inflammation in his bilateral hands and decreased mobility in his left shoulder and bilateral knees, the pain is at baseline for him.     Home Visit: Medically necessary due to dementia/cognitive impairment, unsteady gait/poor balance, and Illness or condition that results in activity limitation or restriction that impacts the ability to leave home such as: equipment and /or human assistance needed to safely leave the home, patient has limited support systems to help the patient attend office visits, the office visit would require excessive physical effort or pain for the patient.      [Current Medications]     [Current Medications]     Current Outpatient Medications:     Allergy Relief, loratadine, 10 mg tablet, Take 1 tablet (10 mg) by mouth once daily., Disp: 30 tablet, Rfl: 11    amLODIPine (Norvasc) 5 mg tablet, Take 1 tablet (5 mg) by mouth once daily., Disp: 30 tablet, Rfl: 11    artificial tears, dextran-hypomel-glycerin, 0.1-0.3-0.2 % ophthalmic solution, Administer 1 drop into affected eye(s) 4 times a day as needed for dry eyes., Disp: , Rfl:     azelastine (Astelin) 137 mcg (0.1 %) nasal spray, Administer 1 spray into each nostril 2 times a day., Disp: 30 mL, Rfl: 11    budesonide EC (Entocort EC) 3 mg 24 hr capsule, Take 1 capsule (3  mg) by mouth once daily., Disp: 30 capsule, Rfl: 11    cholecalciferol (Vitamin D-3) 125 mcg (5000 UT) capsule, Take 1 capsule (125 mcg) by mouth once daily., Disp: 30 capsule, Rfl: 11    cyanocobalamin (Vitamin B-12) 500 mcg tablet, Take 1 tablet (500 mcg) by mouth once daily., Disp: 30 tablet, Rfl: 11    furosemide (Lasix) 20 mg tablet, Take 3 tablets (60 mg) by mouth once daily. In the morning. (Patient taking differently: Take 3 tablets (60 mg) by mouth once daily. Not taking every day, only with increased BLE edema as needed.), Disp: 90 tablet, Rfl: 11    gabapentin (Neurontin) 300 mg capsule, Take 1 capsule (300 mg) by mouth 3 times a day., Disp: 90 capsule, Rfl: 11    lisinopril 20 mg tablet, Take 1 tablet (20 mg) by mouth once daily., Disp: , Rfl:     meloxicam (Mobic) 7.5 mg tablet, Take 1 tablet (7.5 mg) by mouth once daily., Disp: 30 tablet, Rfl: 11    memantine (Namenda) 28 mg capsule,sprinkle,ER 24hr, Take 1 capsule (28 mg) by mouth once daily., Disp: 30 capsule, Rfl: 11    polyethylene glycol (Glycolax, Miralax) 17 gram packet, Take 17 g by mouth once daily. Do not start before February 23, 2024., Disp: , Rfl:     polyvinyl alcohol (Liquifilm Tears) 1.4 % ophthalmic solution, Administer 2 drops into both eyes 3 times a day as needed., Disp: , Rfl:     potassium chloride CR (Klor-Con M20) 20 mEq ER tablet, Take 1 tablet (20 mEq) by mouth once daily. Do not crush, chew, or split., Disp: 30 tablet, Rfl: 11    simvastatin (Zocor) 20 mg tablet, Take 1 tablet (20 mg) by mouth once daily., Disp: 30 tablet, Rfl: 11    tamsulosin (Flomax) 0.4 mg 24 hr capsule, Take 1 capsule (0.4 mg) by mouth once daily., Disp: 30 capsule, Rfl: 11    traZODone (Desyrel) 100 mg tablet, Take 1 tablet (100 mg) by mouth once daily at bedtime., Disp: 30 tablet, Rfl: 11    venlafaxine (Effexor) 50 mg tablet, Take 1 tablet (50 mg) by mouth once daily in the morning. Take before meals., Disp: 30 tablet, Rfl: 11     Review of  Systems  Constitutional:  Negative for activity change, appetite change, chills, diaphoresis, fatigue, fever and unexpected weight change.   HENT:  Positive for congestion, postnasal drip, rhinorrhea and sinus pressure. Negative for dental problem, sneezing, sore throat and trouble swallowing.         Chronic congestion and sinus issues.   Eyes:  Negative for visual disturbance.   Respiratory:  Positive for shortness of breath. Negative for cough, choking, chest tightness and wheezing.         Mild shortness of breath with exertion.   Cardiovascular:  Positive for leg swelling. Negative for chest pain and palpitations.        Edema is limited to the RLE this visit. Admits that this leg does dangle off his recliner at times. Endorses compliance with lasix and potassium.  Gastrointestinal:  Negative for abdominal distention, abdominal pain, blood in stool, constipation, diarrhea, nausea and vomiting.   Genitourinary:  Negative for difficulty urinating, dysuria, flank pain, frequency, hematuria and urgency.   Musculoskeletal:  Positive for arthralgias, back pain, joint swelling and myalgias.   Skin:  Negative for rash and wound.   Neurological:  Negative for dizziness, tremors, seizures, syncope, speech difficulty, weakness, light-headedness and headaches.   Hematological:  Bruises/bleeds easily.   Psychiatric/Behavioral:  Positive for dysphoric mood. Negative for sleep disturbance. The patient is not nervous/anxious.         Reports his mood is stable.       Current Medications[1]     Review of Systems    Objective   /82   Pulse 86   Temp 36.8 °C (98.3 °F)   Resp 19   SpO2 97% Comment: room air  Lab Results   Component Value Date    HGBA1C 5.7 01/27/2022      Lab Results   Component Value Date    WBC 5.8 04/16/2025    HGB 12.4 (L) 04/16/2025    HCT 35.3 (L) 04/16/2025    .9 (H) 04/16/2025     04/16/2025      Lab Results   Component Value Date    GLUCOSE 99 04/16/2025    CALCIUM 9.1 04/16/2025      04/16/2025    K 3.5 04/16/2025    CO2 24 04/16/2025     04/16/2025    BUN 19 04/16/2025    CREATININE 1.14 04/16/2025      Lab Results   Component Value Date    INR 1.1 07/27/2023    INR 1.1 06/04/2019    PROTIME 11.2 07/27/2023    PROTIME 11.4 06/04/2019          Physical Exam  Constitutional:       General: He is not in acute distress.     Appearance: He is not ill-appearing or toxic-appearing.   HENT:      Head: Normocephalic.      Nose: Congestion present. No rhinorrhea.      Mouth/Throat:      Mouth: Mucous membranes are moist.      Pharynx: Oropharynx is clear. No oropharyngeal exudate or posterior oropharyngeal erythema.   Eyes:      General: No scleral icterus.     Extraocular Movements: Extraocular movements intact.      Conjunctiva/sclera: Conjunctivae normal.      Pupils: Pupils are equal, round, and reactive to light.   Cardiovascular:      Rate and Rhythm: Normal rate and regular rhythm.      Heart sounds: No murmur heard.     Comments: No JVD noted  RLE with 2+ pitting edema  Pulmonary:      Effort: Pulmonary effort is normal. No respiratory distress.      Breath sounds: No stridor. No wheezing, rhonchi or rales.   Chest:      Chest wall: No tenderness.   Abdominal:      General: Bowel sounds are normal. There is no distension.      Palpations: Abdomen is soft.      Tenderness: There is no abdominal tenderness. There is no guarding.   Genitourinary:     Comments: Did not observe area  Musculoskeletal:      Cervical back: Neck supple.      Comments: Noted joint deformities in bilateral hands/fingers.    Decreased active ROM in bilateral shoulders, knees and hips.   Skin:     General: Skin is warm and dry.      Coloration: Skin is not jaundiced.      Findings: Bruising present.      Comments: BUE with scattered areas of ecchymosis    Neurological:      General: No focal deficit present.      Mental Status: He is alert and oriented to person, place, and time.      Comments: Clear and  coherent speech noted. MAEx4 with equal strength. No concern for intoxication this visit.   Psychiatric:         Mood and Affect: Mood normal.         Behavior: Behavior normal.         Thought Content: Thought content normal.         Judgment: Judgment normal.         Assessment/Plan   {Assess/PlanSmartLinks:43299}             Marie Verde, APRN-CNP         [1]   Current Outpatient Medications:     furosemide (Lasix) 20 mg tablet, Take 3 tablets (60 mg) by mouth once daily. Not taking every day, only with increased BLE edema as needed., Disp: 270 tablet, Rfl: 3    Allergy Relief, loratadine, 10 mg tablet, Take 1 tablet (10 mg) by mouth once daily., Disp: 30 tablet, Rfl: 11    amLODIPine (Norvasc) 5 mg tablet, Take 1 tablet (5 mg) by mouth once daily., Disp: 30 tablet, Rfl: 11    artificial tears, dextran-hypomel-glycerin, 0.1-0.3-0.2 % ophthalmic solution, Administer 1 drop into affected eye(s) 4 times a day as needed for dry eyes., Disp: , Rfl:     azelastine (Astelin) 137 mcg (0.1 %) nasal spray, Administer 1 spray into each nostril 2 times a day., Disp: 30 mL, Rfl: 11    budesonide EC (Entocort EC) 3 mg 24 hr capsule, Take 1 capsule (3 mg) by mouth once daily., Disp: 30 capsule, Rfl: 11    cholecalciferol (Vitamin D-3) 125 mcg (5000 UT) capsule, Take 1 capsule (125 mcg) by mouth once daily., Disp: 30 capsule, Rfl: 11    cyanocobalamin, vitamin B-12, (Vitamin B-12) 2,500 mcg tablet, sublingual SL tablet, PLACE ONE TABLET UNDER THE TONGUE TO DISSOLVE EVERY MONDAY, WEDNESDAY, AND FRIDAY., Disp: 12 tablet, Rfl: 11    gabapentin (Neurontin) 300 mg capsule, Take 1 capsule (300 mg) by mouth 3 times a day., Disp: 90 capsule, Rfl: 11    lisinopril 20 mg tablet, Take 1 tablet (20 mg) by mouth once daily., Disp: , Rfl:     meloxicam (Mobic) 7.5 mg tablet, Take 1 tablet (7.5 mg) by mouth once daily., Disp: 30 tablet, Rfl: 11    memantine (Namenda) 28 mg capsule,sprinkle,ER 24hr, Take 1 capsule (28 mg) by mouth once daily.,  Disp: 30 capsule, Rfl: 11    polyethylene glycol (Glycolax, Miralax) 17 gram packet, Take 17 g by mouth once daily. Do not start before February 23, 2024., Disp: , Rfl:     polyvinyl alcohol (Liquifilm Tears) 1.4 % ophthalmic solution, Administer 2 drops into both eyes 3 times a day as needed., Disp: , Rfl:     potassium chloride CR (Klor-Con M20) 20 mEq ER tablet, Take 1 tablet (20 mEq) by mouth once daily. Do not crush, chew, or split., Disp: 30 tablet, Rfl: 11    simvastatin (Zocor) 20 mg tablet, Take 1 tablet (20 mg) by mouth once daily., Disp: 30 tablet, Rfl: 11    tamsulosin (Flomax) 0.4 mg 24 hr capsule, Take 1 capsule (0.4 mg) by mouth once daily., Disp: 30 capsule, Rfl: 11    thiamine (thiamine mononitrate, vit B1,) 100 mg tablet, Take 1 tablet (100 mg) by mouth once daily., Disp: 30 tablet, Rfl: 11    traZODone (Desyrel) 100 mg tablet, Take 1 tablet (100 mg) by mouth once daily at bedtime., Disp: 30 tablet, Rfl: 11    venlafaxine (Effexor) 50 mg tablet, Take 1 tablet (50 mg) by mouth once daily in the morning. Take before meals., Disp: 30 tablet, Rfl: 11

## 2025-06-19 LAB
ALBUMIN SERPL-MCNC: 4.2 G/DL (ref 3.6–5.1)
ALP SERPL-CCNC: 73 U/L (ref 35–144)
ALT SERPL-CCNC: 9 U/L (ref 9–46)
ANION GAP SERPL CALCULATED.4IONS-SCNC: 20 MMOL/L (CALC) (ref 7–17)
AST SERPL-CCNC: 14 U/L (ref 10–35)
BILIRUB SERPL-MCNC: 0.6 MG/DL (ref 0.2–1.2)
BUN SERPL-MCNC: 21 MG/DL (ref 7–25)
CALCIUM SERPL-MCNC: 9.4 MG/DL (ref 8.6–10.3)
CHLORIDE SERPL-SCNC: 103 MMOL/L (ref 98–110)
CO2 SERPL-SCNC: 18 MMOL/L (ref 20–32)
CREAT SERPL-MCNC: 1.56 MG/DL (ref 0.7–1.22)
EGFRCR SERPLBLD CKD-EPI 2021: 44 ML/MIN/1.73M2
GLUCOSE SERPL-MCNC: 83 MG/DL (ref 65–99)
POTASSIUM SERPL-SCNC: 3.9 MMOL/L (ref 3.5–5.3)
PROT SERPL-MCNC: 6.9 G/DL (ref 6.1–8.1)
SODIUM SERPL-SCNC: 141 MMOL/L (ref 135–146)

## 2025-06-27 DIAGNOSIS — I10 ESSENTIAL HYPERTENSION, BENIGN: ICD-10-CM

## 2025-06-27 DIAGNOSIS — I87.2 CHRONIC VENOUS INSUFFICIENCY OF LOWER EXTREMITY: ICD-10-CM

## 2025-06-27 DIAGNOSIS — R60.0 BILATERAL LEG EDEMA: ICD-10-CM

## 2025-06-27 RX ORDER — POTASSIUM CHLORIDE 20 MEQ/1
20 TABLET, EXTENDED RELEASE ORAL DAILY
Qty: 30 TABLET | Refills: 11 | Status: SHIPPED | OUTPATIENT
Start: 2025-06-27

## 2025-07-03 NOTE — ASSESSMENT & PLAN NOTE
"Grief/Depression - Chronic, stable. Both of his children are , a son at age 55 years to a ruptured AAA and a daughter to MS. His wife currently resides in a SNF. No SI. Follows with [facility]. Is not ready to seek additional help in the home or leave home for intermediate. Daily alcohol use. Follows with Nithya Fitch [facility] with [facility].  -continue support services  -continue memantine 28mg, venlafaxine 50 mg, trazodone 100 mg   -encourage alcohol cessation    Alcoholism - admits to daily alcohol use, but \"not too much, a beer or two\". He is not ready to discuss abstinence at this time.  -continue thiamine 100 mg     "

## 2025-07-03 NOTE — ASSESSMENT & PLAN NOTE
"HTN/HLD/Venous Insufficiency/BLE edema/AAA - chronic, BP stable this visit. Edema limited to RLE (2+ pitting), has not been taking lasix consistently.  He is unable to independently apply compression stockings or wrap his legs d/t RA/OA in bilateral hands. He is not interested in pursing surgical intervention for AAA at this time.  CT abdomen in December 2023: No acute abdominopelvic pathology.  Interval increase in size an infrarenal abdominal aortic aneurysm  measuring 7.9 x 7.5 cm, previously measuring 7 x 6 cm in 2022. There  is diffuse colonic diverticulosis without evidence of diverticulitis.  Lab Results   Component Value Date    HDL 79 04/16/2025    HDL 64.6 10/16/2024    HDL 63.0 07/10/2024     Lab Results   Component Value Date    LDLCALC 64 04/16/2025    LDLCALC 86 10/16/2024    LDLCALC 70 07/10/2024     Lab Results   Component Value Date    TRIG 64 04/16/2025    TRIG 60 10/16/2024    TRIG 60 07/10/2024     No components found for: \"CHOLHDL\"  -continue amlodipine 5 mg, furosemide 60 mg, lisinopril 20 mg, potassium chloride 20 mEq, and simvastatin 20 mg  -continue to encourage heart healthy diet and abstaining from alcohol  -continue to encourage elevation of BLE  -routinely monitor cbc, cmp, and lipids  "

## 2025-07-22 ENCOUNTER — TELEPHONE (OUTPATIENT)
Dept: PRIMARY CARE | Facility: CLINIC | Age: 85
End: 2025-07-22
Payer: MEDICARE

## 2025-07-23 ENCOUNTER — OFFICE VISIT (OUTPATIENT)
Dept: PRIMARY CARE | Facility: CLINIC | Age: 85
End: 2025-07-23
Payer: MEDICARE

## 2025-07-23 VITALS
SYSTOLIC BLOOD PRESSURE: 118 MMHG | HEART RATE: 77 BPM | OXYGEN SATURATION: 96 % | RESPIRATION RATE: 19 BRPM | DIASTOLIC BLOOD PRESSURE: 74 MMHG

## 2025-07-23 DIAGNOSIS — R53.1 GENERALIZED WEAKNESS: ICD-10-CM

## 2025-07-23 DIAGNOSIS — I71.43 INFRARENAL ABDOMINAL AORTIC ANEURYSM (AAA) WITHOUT RUPTURE: ICD-10-CM

## 2025-07-23 DIAGNOSIS — E78.2 MIXED HYPERLIPIDEMIA: ICD-10-CM

## 2025-07-23 DIAGNOSIS — F43.21 GRIEF: ICD-10-CM

## 2025-07-23 DIAGNOSIS — I87.2 CHRONIC VENOUS INSUFFICIENCY OF LOWER EXTREMITY: ICD-10-CM

## 2025-07-23 DIAGNOSIS — F41.8 MIXED ANXIETY AND DEPRESSIVE DISORDER: ICD-10-CM

## 2025-07-23 DIAGNOSIS — R60.0 EDEMA OF RIGHT LOWER EXTREMITY: ICD-10-CM

## 2025-07-23 DIAGNOSIS — G62.9 NEUROPATHY: ICD-10-CM

## 2025-07-23 DIAGNOSIS — F10.20 ALCOHOLISM (MULTI): ICD-10-CM

## 2025-07-23 DIAGNOSIS — I10 ESSENTIAL HYPERTENSION, BENIGN: ICD-10-CM

## 2025-07-23 DIAGNOSIS — M06.9 RHEUMATOID ARTHRITIS INVOLVING MULTIPLE SITES, UNSPECIFIED WHETHER RHEUMATOID FACTOR PRESENT (MULTI): ICD-10-CM

## 2025-07-23 DIAGNOSIS — M15.0 PRIMARY OSTEOARTHRITIS INVOLVING MULTIPLE JOINTS: Primary | ICD-10-CM

## 2025-07-23 PROCEDURE — 3074F SYST BP LT 130 MM HG: CPT

## 2025-07-23 PROCEDURE — 99349 HOME/RES VST EST MOD MDM 40: CPT

## 2025-07-23 PROCEDURE — 3078F DIAST BP <80 MM HG: CPT

## 2025-07-23 PROCEDURE — 1159F MED LIST DOCD IN RCRD: CPT

## 2025-07-23 PROCEDURE — 1160F RVW MEDS BY RX/DR IN RCRD: CPT

## 2025-07-23 NOTE — PROGRESS NOTES
"Subjective   Patient ID: Demarco Gudino is a 84 y.o. male who presents for Follow-up (Multiple chronic medical conditions).    HPI       NEEDS: new recliner and aid in the home.    Patient seen on the couch in his private home. He is alert, oriented to person, place, time and situation. He is able to answer all questions regarding his health.      PMH: HTN, HLD, AAA, PVD, rheumatoid arthritis, neuropathy, depression/anxiety and dementia.     Demarco is in the kitchen when I arrive, ambulates independently to the couch where he sits with his feet dependent. He has a steady, but slow gait. The stairs are difficult for him to manage. He continues to reside in a single family two story home by himself. He has a limited support system as he wife resides in a LTC facility and his only other relative lives 3 hours away. He does not drive and has his medications delivered. He is also active with [employer] and a . Demarco ambulates independently and denies any recent falls. Medications reviewed with patient.  He denies fever, chills, malaise. No N/V/D, bowel or bladder problems.      BLE edema/PVD - Reports edema only in his RLE today, \"my left leg is doing great!\". He admits that his right leg will often dangle from his recliner, especially when he is sleeping. Endorses compliance with lasix and potassium. He is not able to apply compression stockings or wrap his legs due to stiffness and loss of dexterity in both of his hands. He denies any open areas or blisters, no warmth or redness. He does report pain, but does not feel it is worse than normal. He continues to report neuropathic pain in his BLE.      Depression/ Dementia/ Suicide attempt Hx - Stable. He has experienced the loss of his two children, a son who passed from a ruptured AAA, and a daughter who passed away from MS. He cared for his daughter in his home when she declined.  States \"I'm still here\", when asked about his general well-being. He " reports that he is fatigue is about the same. There are several boxes of alcohol around the home, and he admits to drinking daily, a beer or two. He has not seen his nephew in person for many weeks, but has talked to him on the phone. Patient denies any current SI. Follows with Nithya Fitch CNP with [facility].     AAA - Surgical repair was originally recommended in 09/2022. Patient has not yet followed with a vascular surgeon and has no intentions to do so at this time. He denies chest, abdominal, back, or flank pain. No nausea/vomiting. No discoloration on trunk and groin/scrotum areas, or abdominal pulsations are reported. He does not monitor his BP, but is compliant with his medications.     Arthritis of multiple joints - Patient admits to chronic pain/ inflammation in his bilateral hands and decreased mobility in his left shoulder and bilateral knees, the pain is at baseline for him.       Home Visit: Medically necessary due to dementia/cognitive impairment, unsteady gait/poor balance, and Illness or condition that results in activity limitation or restriction that impacts the ability to leave home such as: equipment and /or human assistance needed to safely leave the home, patient has limited support systems to help the patient attend office visits, the office visit would require excessive physical effort or pain for the patient.     Current Medications[1]     Review of Systems  Constitutional:  Negative for activity change, appetite change, chills, diaphoresis, fatigue, fever and unexpected weight change.   HENT:  Positive for congestion, postnasal drip, rhinorrhea and sinus pressure. Negative for dental problem, sneezing, sore throat and trouble swallowing.         Chronic congestion and sinus issues.   Eyes:  Negative for visual disturbance.   Respiratory:  Positive for shortness of breath. Negative for cough, choking, chest tightness and wheezing.         Mild shortness of breath with exertion.    Cardiovascular:  Positive for leg swelling. Negative for chest pain and palpitations.        Edema is limited to the RLE this visit. Admits that this leg does dangle off his recliner at times. Endorses compliance with lasix and potassium.  Gastrointestinal:  Negative for abdominal distention, abdominal pain, blood in stool, constipation, diarrhea, nausea and vomiting.   Genitourinary:  Negative for difficulty urinating, dysuria, flank pain, frequency, hematuria and urgency.   Musculoskeletal:  Positive for arthralgias, back pain, joint swelling and myalgias.   Skin:  Negative for rash and wound.   Neurological:  Negative for dizziness, tremors, seizures, syncope, speech difficulty, weakness, light-headedness and headaches.   Hematological:  Bruises/bleeds easily.   Psychiatric/Behavioral:  Positive for dysphoric mood. Negative for sleep disturbance. The patient is not nervous/anxious.         Reports his mood is stable.   Objective   /74   Pulse 77   Resp 19   SpO2 96% Comment: room air  Lab Results   Component Value Date    HGBA1C 5.7 01/27/2022      Lab Results   Component Value Date    WBC 5.8 04/16/2025    HGB 12.4 (L) 04/16/2025    HCT 35.3 (L) 04/16/2025    .9 (H) 04/16/2025     04/16/2025      Lab Results   Component Value Date    GLUCOSE 83 06/18/2025    CALCIUM 9.4 06/18/2025     06/18/2025    K 3.9 06/18/2025    CO2 18 (L) 06/18/2025     06/18/2025    BUN 21 06/18/2025    CREATININE 1.56 (H) 06/18/2025      Lab Results   Component Value Date    INR 1.1 07/27/2023    INR 1.1 06/04/2019    PROTIME 11.2 07/27/2023    PROTIME 11.4 06/04/2019          Physical Exam  Constitutional:       General: He is not in acute distress.     Appearance: He is not ill-appearing or toxic-appearing.   HENT:      Head: Normocephalic.      Nose: Congestion present. No rhinorrhea.      Mouth/Throat:      Mouth: Mucous membranes are moist.      Pharynx: Oropharynx is clear. No oropharyngeal  exudate or posterior oropharyngeal erythema.   Eyes:      General: No scleral icterus.     Extraocular Movements: Extraocular movements intact.      Conjunctiva/sclera: Conjunctivae normal.      Pupils: Pupils are equal, round, and reactive to light.   Cardiovascular:      Rate and Rhythm: Normal rate and regular rhythm.      Heart sounds: No murmur heard.     Comments: No JVD noted  RLE with 2+ pitting edema  Pulmonary:      Effort: Pulmonary effort is normal. No respiratory distress.      Breath sounds: No stridor. No wheezing, rhonchi or rales.   Chest:      Chest wall: No tenderness.   Abdominal:      General: Bowel sounds are normal. There is no distension.      Palpations: Abdomen is soft.      Tenderness: There is no abdominal tenderness. There is no guarding.   Genitourinary:     Comments: Did not observe area  Musculoskeletal:      Cervical back: Neck supple.      Comments: Noted joint deformities in bilateral hands/fingers.    Decreased active ROM in bilateral shoulders, knees and hips.   Skin:     General: Skin is warm and dry.      Coloration: Skin is not jaundiced.      Findings: Bruising present.      Comments: BUE with scattered areas of ecchymosis    Neurological:      General: No focal deficit present.      Mental Status: He is alert and oriented to person, place, and time.      Comments: Clear and coherent speech noted. MAEx4 with equal strength. No concern for intoxication this visit.   Psychiatric:         Mood and Affect: Mood normal.         Behavior: Behavior normal.         Thought Content: Thought content normal.         Judgment: Judgment normal.   Assessment/Plan                Marie Verde, APRN-CNP             [1]    Current Outpatient Medications:   •  furosemide (Lasix) 20 mg tablet, Take 3 tablets (60 mg) by mouth once daily. Not taking every day, only with increased BLE edema as needed., Disp: 270 tablet, Rfl: 3  •  Allergy Relief, loratadine, 10 mg tablet, Take 1 tablet (10 mg) by  mouth once daily., Disp: 30 tablet, Rfl: 11  •  amLODIPine (Norvasc) 5 mg tablet, Take 1 tablet (5 mg) by mouth once daily., Disp: 30 tablet, Rfl: 11  •  artificial tears, dextran-hypomel-glycerin, 0.1-0.3-0.2 % ophthalmic solution, Administer 1 drop into affected eye(s) 4 times a day as needed for dry eyes., Disp: , Rfl:   •  azelastine (Astelin) 137 mcg (0.1 %) nasal spray, Administer 1 spray into each nostril 2 times a day., Disp: 30 mL, Rfl: 11  •  budesonide EC (Entocort EC) 3 mg 24 hr capsule, Take 1 capsule (3 mg) by mouth once daily., Disp: 30 capsule, Rfl: 11  •  cholecalciferol (Vitamin D-3) 125 mcg (5000 UT) capsule, Take 1 capsule (125 mcg) by mouth once daily., Disp: 30 capsule, Rfl: 11  •  cyanocobalamin, vitamin B-12, (Vitamin B-12) 2,500 mcg tablet, sublingual SL tablet, PLACE ONE TABLET UNDER THE TONGUE TO DISSOLVE EVERY MONDAY, WEDNESDAY, AND FRIDAY., Disp: 12 tablet, Rfl: 11  •  lisinopril 20 mg tablet, Take 1 tablet (20 mg) by mouth once daily., Disp: , Rfl:   •  meloxicam (Mobic) 7.5 mg tablet, Take 1 tablet (7.5 mg) by mouth once daily., Disp: 30 tablet, Rfl: 11  •  memantine (Namenda) 28 mg capsule,sprinkle,ER 24hr, Take 1 capsule (28 mg) by mouth once daily., Disp: 30 capsule, Rfl: 11  •  polyethylene glycol (Glycolax, Miralax) 17 gram packet, Take 17 g by mouth once daily. Do not start before February 23, 2024., Disp: , Rfl:   •  polyvinyl alcohol (Liquifilm Tears) 1.4 % ophthalmic solution, Administer 2 drops into both eyes 3 times a day as needed., Disp: , Rfl:   •  potassium chloride CR 20 mEq ER tablet, TAKE 1 TABLET (20 MEQ) BY MOUTH ONCE DAILY. DO NOT CRUSH, CHEW, OR SPLIT., Disp: 30 tablet, Rfl: 11  •  simvastatin (Zocor) 20 mg tablet, Take 1 tablet (20 mg) by mouth once daily., Disp: 30 tablet, Rfl: 11  •  tamsulosin (Flomax) 0.4 mg 24 hr capsule, Take 1 capsule (0.4 mg) by mouth once daily., Disp: 30 capsule, Rfl: 11  •  thiamine (thiamine mononitrate, vit B1,) 100 mg tablet, Take 1  tablet (100 mg) by mouth once daily., Disp: 30 tablet, Rfl: 11  •  traZODone (Desyrel) 100 mg tablet, Take 1 tablet (100 mg) by mouth once daily at bedtime., Disp: 30 tablet, Rfl: 11  •  venlafaxine (Effexor) 50 mg tablet, Take 1 tablet (50 mg) by mouth once daily in the morning. Take before meals., Disp: 30 tablet, Rfl: 11   capsule, Rfl: 11    cholecalciferol (Vitamin D-3) 125 mcg (5000 UT) capsule, Take 1 capsule (125 mcg) by mouth once daily., Disp: 30 capsule, Rfl: 11    cyanocobalamin, vitamin B-12, (Vitamin B-12) 2,500 mcg tablet, sublingual SL tablet, PLACE ONE TABLET UNDER THE TONGUE TO DISSOLVE EVERY MONDAY, WEDNESDAY, AND FRIDAY., Disp: 12 tablet, Rfl: 11    lisinopril 20 mg tablet, Take 1 tablet (20 mg) by mouth once daily., Disp: , Rfl:     meloxicam (Mobic) 7.5 mg tablet, Take 1 tablet (7.5 mg) by mouth once daily., Disp: 30 tablet, Rfl: 11    memantine (Namenda) 28 mg capsule,sprinkle,ER 24hr, Take 1 capsule (28 mg) by mouth once daily., Disp: 30 capsule, Rfl: 11    polyethylene glycol (Glycolax, Miralax) 17 gram packet, Take 17 g by mouth once daily. Do not start before February 23, 2024., Disp: , Rfl:     polyvinyl alcohol (Liquifilm Tears) 1.4 % ophthalmic solution, Administer 2 drops into both eyes 3 times a day as needed., Disp: , Rfl:     potassium chloride CR 20 mEq ER tablet, TAKE 1 TABLET (20 MEQ) BY MOUTH ONCE DAILY. DO NOT CRUSH, CHEW, OR SPLIT., Disp: 30 tablet, Rfl: 11    simvastatin (Zocor) 20 mg tablet, Take 1 tablet (20 mg) by mouth once daily., Disp: 30 tablet, Rfl: 11    tamsulosin (Flomax) 0.4 mg 24 hr capsule, Take 1 capsule (0.4 mg) by mouth once daily., Disp: 30 capsule, Rfl: 11    thiamine (thiamine mononitrate, vit B1,) 100 mg tablet, Take 1 tablet (100 mg) by mouth once daily., Disp: 30 tablet, Rfl: 11    traZODone (Desyrel) 100 mg tablet, Take 1 tablet (100 mg) by mouth once daily at bedtime., Disp: 30 tablet, Rfl: 11    venlafaxine (Effexor) 50 mg tablet, Take 1 tablet (50 mg) by mouth once daily in the morning. Take before meals., Disp: 30 tablet, Rfl: 11

## 2025-07-23 NOTE — Clinical Note
Demarco is interested in obtaining additional help in the home, and obtaining a new lift chair. I did place and order for the chair (general supply list).  He is active with the [employer] program: Conchita Hunter is his CM. Are you able to help coordinate these requests? Thank you in advance!

## 2025-08-05 NOTE — ASSESSMENT & PLAN NOTE
RA/OA/neuropathy - Chronic, stable.  -continue meloxicam 7.5mg, gabapentin 300mg TID  -patient declined further PT/OT services at this time  -would benefit from a new recliner, with lift if possible

## 2025-08-05 NOTE — ASSESSMENT & PLAN NOTE
Chronic. Stable.   -continue furosemide 60 mg daily  -continue potassium 20 mEq  -encourage heart healthy diet  -encourage BLE elevation. Patient is unable to get to a lymphedema clinic, I am unable to order or manage pumps for him  -would benefit from a new recliner, with lift if possible

## 2025-08-05 NOTE — ASSESSMENT & PLAN NOTE
"HTN/HLD/Venous Insufficiency/BLE edema/AAA - chronic, BP stable this visit. Edema limited to RLE (2+ pitting), has been taking lasix consistently.  He is unable to independently apply compression stockings or wrap his legs d/t RA/OA in bilateral hands. He is not interested in pursing surgical intervention for AAA at this time.  CT abdomen in December 2023: No acute abdominopelvic pathology.  Interval increase in size an infrarenal abdominal aortic aneurysm  measuring 7.9 x 7.5 cm, previously measuring 7 x 6 cm in 2022. There  is diffuse colonic diverticulosis without evidence of diverticulitis.  Lab Results   Component Value Date    HDL 79 04/16/2025    HDL 64.6 10/16/2024    HDL 63.0 07/10/2024     Lab Results   Component Value Date    LDLCALC 64 04/16/2025    LDLCALC 86 10/16/2024    LDLCALC 70 07/10/2024     Lab Results   Component Value Date    TRIG 64 04/16/2025    TRIG 60 10/16/2024    TRIG 60 07/10/2024     No components found for: \"CHOLHDL\"  -continue amlodipine 5 mg, furosemide 60 mg, lisinopril 20 mg, potassium chloride 20 mEq, and simvastatin 20 mg  -continue to encourage heart healthy diet and abstaining from alcohol  -continue to encourage elevation of BLE  -routinely monitor cbc, cmp, and lipids  "

## 2025-08-05 NOTE — ASSESSMENT & PLAN NOTE
"Grief/Depression - Chronic, stable. Both of his children are , a son at age 55 years to a ruptured AAA and a daughter to MS. His wife currently resides in a SNF. No SI. Follows with [facility]. He is ready to have additional help in the home.  Daily alcohol use. Follows with Nithya Fitch CNP with [facility].  -continue support services, coordinate with Fam Hunter for help in home and new recliner  -continue memantine 28mg, venlafaxine 50 mg, trazodone 100 mg   -encourage alcohol cessation    Alcoholism - admits to daily alcohol use, but \"not too much, a beer or two\". He is not ready to discuss abstinence at this time.  -continue thiamine 100 mg     "

## 2025-09-03 ENCOUNTER — TELEPHONE (OUTPATIENT)
Dept: PRIMARY CARE | Facility: CLINIC | Age: 85
End: 2025-09-03
Payer: MEDICARE

## 2025-09-03 ENCOUNTER — APPOINTMENT (OUTPATIENT)
Dept: PRIMARY CARE | Facility: CLINIC | Age: 85
End: 2025-09-03
Payer: MEDICARE